# Patient Record
Sex: FEMALE | Race: WHITE | NOT HISPANIC OR LATINO | Employment: FULL TIME | URBAN - METROPOLITAN AREA
[De-identification: names, ages, dates, MRNs, and addresses within clinical notes are randomized per-mention and may not be internally consistent; named-entity substitution may affect disease eponyms.]

---

## 2017-02-21 ENCOUNTER — COMMUNICATION - HEALTHEAST (OUTPATIENT)
Dept: INTERNAL MEDICINE | Facility: CLINIC | Age: 42
End: 2017-02-21

## 2017-04-07 ENCOUNTER — COMMUNICATION - HEALTHEAST (OUTPATIENT)
Dept: INTERNAL MEDICINE | Facility: CLINIC | Age: 42
End: 2017-04-07

## 2017-07-11 ENCOUNTER — COMMUNICATION - HEALTHEAST (OUTPATIENT)
Dept: INTERNAL MEDICINE | Facility: CLINIC | Age: 42
End: 2017-07-11

## 2017-07-19 ENCOUNTER — COMMUNICATION - HEALTHEAST (OUTPATIENT)
Dept: INTERNAL MEDICINE | Facility: CLINIC | Age: 42
End: 2017-07-19

## 2017-07-19 DIAGNOSIS — F41.1 GAD (GENERALIZED ANXIETY DISORDER): ICD-10-CM

## 2017-11-21 ENCOUNTER — COMMUNICATION - HEALTHEAST (OUTPATIENT)
Dept: INTERNAL MEDICINE | Facility: CLINIC | Age: 42
End: 2017-11-21

## 2018-01-09 ENCOUNTER — COMMUNICATION - HEALTHEAST (OUTPATIENT)
Dept: INTERNAL MEDICINE | Facility: CLINIC | Age: 43
End: 2018-01-09

## 2018-10-22 ENCOUNTER — OFFICE VISIT - HEALTHEAST (OUTPATIENT)
Dept: INTERNAL MEDICINE | Facility: CLINIC | Age: 43
End: 2018-10-22

## 2018-10-22 DIAGNOSIS — F41.1 GAD (GENERALIZED ANXIETY DISORDER): ICD-10-CM

## 2018-10-22 DIAGNOSIS — F41.1 ANXIETY STATE: ICD-10-CM

## 2018-10-22 DIAGNOSIS — R00.2 PALPITATIONS: ICD-10-CM

## 2018-10-22 LAB
ATRIAL RATE - MUSE: 103 BPM
DIASTOLIC BLOOD PRESSURE - MUSE: NORMAL MMHG
INTERPRETATION ECG - MUSE: NORMAL
P AXIS - MUSE: 46 DEGREES
PR INTERVAL - MUSE: 120 MS
QRS DURATION - MUSE: 82 MS
QT - MUSE: 362 MS
QTC - MUSE: 474 MS
R AXIS - MUSE: -9 DEGREES
SYSTOLIC BLOOD PRESSURE - MUSE: NORMAL MMHG
T AXIS - MUSE: 23 DEGREES
VENTRICULAR RATE- MUSE: 103 BPM

## 2018-10-22 ASSESSMENT — MIFFLIN-ST. JEOR: SCORE: 1684.61

## 2018-11-05 NOTE — RAD
PA AND LATERAL CHEST XRAY:

 

DATE: 11/5/2018.

 

HISTORY: 

Cough, body aches, and sore throat.

 

COMPARISON: 

None available.

 

FINDINGS: 

Postsurgical changes related to anterior cervical fusion lower cervical spine are noted.  Cardiac tobias
houette and pulmonary vasculature are within normal limits.  The lungs are clear.  Osseous structures
 are intact.  

 

IMPRESSION: 

No acute cardiopulmonary process.

 

POS: [facility]

## 2019-01-02 ENCOUNTER — OFFICE VISIT - HEALTHEAST (OUTPATIENT)
Dept: INTERNAL MEDICINE | Facility: CLINIC | Age: 44
End: 2019-01-02

## 2019-01-02 DIAGNOSIS — Z00.00 ROUTINE GENERAL MEDICAL EXAMINATION AT A HEALTH CARE FACILITY: ICD-10-CM

## 2019-01-02 LAB
ALBUMIN SERPL-MCNC: 3.8 G/DL (ref 3.5–5)
ALP SERPL-CCNC: 69 U/L (ref 45–120)
ALT SERPL W P-5'-P-CCNC: 21 U/L (ref 0–45)
ANION GAP SERPL CALCULATED.3IONS-SCNC: 12 MMOL/L (ref 5–18)
AST SERPL W P-5'-P-CCNC: 21 U/L (ref 0–40)
BILIRUB SERPL-MCNC: 0.8 MG/DL (ref 0–1)
BUN SERPL-MCNC: 13 MG/DL (ref 8–22)
CALCIUM SERPL-MCNC: 9.2 MG/DL (ref 8.5–10.5)
CHLORIDE BLD-SCNC: 106 MMOL/L (ref 98–107)
CHOLEST SERPL-MCNC: 180 MG/DL
CO2 SERPL-SCNC: 23 MMOL/L (ref 22–31)
CREAT SERPL-MCNC: 0.83 MG/DL (ref 0.6–1.1)
ERYTHROCYTE [DISTWIDTH] IN BLOOD BY AUTOMATED COUNT: 13.6 % (ref 11–14.5)
FASTING STATUS PATIENT QL REPORTED: YES
GFR SERPL CREATININE-BSD FRML MDRD: >60 ML/MIN/1.73M2
GLUCOSE BLD-MCNC: 92 MG/DL (ref 70–125)
HCT VFR BLD AUTO: 41.6 % (ref 35–47)
HDLC SERPL-MCNC: 53 MG/DL
HGB BLD-MCNC: 13.6 G/DL (ref 12–16)
LDLC SERPL CALC-MCNC: 99 MG/DL
MCH RBC QN AUTO: 29.4 PG (ref 27–34)
MCHC RBC AUTO-ENTMCNC: 32.7 G/DL (ref 32–36)
MCV RBC AUTO: 90 FL (ref 80–100)
PLATELET # BLD AUTO: 271 THOU/UL (ref 140–440)
PMV BLD AUTO: 11 FL (ref 8.5–12.5)
POTASSIUM BLD-SCNC: 4 MMOL/L (ref 3.5–5)
PROT SERPL-MCNC: 7 G/DL (ref 6–8)
RBC # BLD AUTO: 4.62 MILL/UL (ref 3.8–5.4)
SODIUM SERPL-SCNC: 141 MMOL/L (ref 136–145)
TRIGL SERPL-MCNC: 139 MG/DL
WBC: 6.8 THOU/UL (ref 4–11)

## 2019-01-02 ASSESSMENT — MIFFLIN-ST. JEOR: SCORE: 1702.76

## 2019-01-03 ENCOUNTER — COMMUNICATION - HEALTHEAST (OUTPATIENT)
Dept: INTERNAL MEDICINE | Facility: CLINIC | Age: 44
End: 2019-01-03

## 2019-02-19 ENCOUNTER — COMMUNICATION - HEALTHEAST (OUTPATIENT)
Dept: INTERNAL MEDICINE | Facility: CLINIC | Age: 44
End: 2019-02-19

## 2019-02-28 ENCOUNTER — OFFICE VISIT (OUTPATIENT)
Dept: OBGYN | Facility: CLINIC | Age: 44
End: 2019-02-28
Payer: COMMERCIAL

## 2019-02-28 ENCOUNTER — ANCILLARY PROCEDURE (OUTPATIENT)
Dept: MAMMOGRAPHY | Facility: CLINIC | Age: 44
End: 2019-02-28
Payer: COMMERCIAL

## 2019-02-28 VITALS — SYSTOLIC BLOOD PRESSURE: 150 MMHG | DIASTOLIC BLOOD PRESSURE: 86 MMHG | WEIGHT: 212.6 LBS

## 2019-02-28 DIAGNOSIS — Z12.31 VISIT FOR SCREENING MAMMOGRAM: ICD-10-CM

## 2019-02-28 DIAGNOSIS — Z12.4 SCREENING FOR MALIGNANT NEOPLASM OF CERVIX: ICD-10-CM

## 2019-02-28 DIAGNOSIS — Z01.419 ENCOUNTER FOR GYNECOLOGICAL EXAMINATION WITHOUT ABNORMAL FINDING: Primary | ICD-10-CM

## 2019-02-28 PROCEDURE — 87624 HPV HI-RISK TYP POOLED RSLT: CPT | Performed by: OBSTETRICS & GYNECOLOGY

## 2019-02-28 PROCEDURE — 77067 SCR MAMMO BI INCL CAD: CPT | Mod: TC

## 2019-02-28 PROCEDURE — G0124 SCREEN C/V THIN LAYER BY MD: HCPCS | Performed by: OBSTETRICS & GYNECOLOGY

## 2019-02-28 PROCEDURE — 99386 PREV VISIT NEW AGE 40-64: CPT | Performed by: OBSTETRICS & GYNECOLOGY

## 2019-02-28 PROCEDURE — G0145 SCR C/V CYTO,THINLAYER,RESCR: HCPCS | Performed by: OBSTETRICS & GYNECOLOGY

## 2019-02-28 RX ORDER — LORAZEPAM 1 MG/1
1 TABLET ORAL
COMMUNITY
Start: 2018-10-22 | End: 2022-06-07

## 2019-02-28 NOTE — NURSING NOTE
Chief Complaint   Patient presents with     Establish Care     Gyn Exam   c/o vaginal changes    Initial Wt 96.4 kg (212 lb 9.6 oz)   LMP 2019 (Approximate)  There is no height or weight on file to calculate BMI.  BP completed using cuff size: large    Questioned patient about current smoking habits.  Pt. has never smoked.          The following HM Due: NONE      Mia Tomas CMA

## 2019-02-28 NOTE — PROGRESS NOTES
SUBJECTIVE:                                                      Cristina is a 44 year old  female who presents for annual exam.     Patient's last menstrual period was 2019 (approximate).. Menses are regular q 28-30 days and normal lasting 4 days.  Using unsure for contraception.  She is not currently considering pregnancy.  Besides routine health maintenance, she has no other health concerns today .  Had annual physical in 2019 with primary MD  GYNECOLOGIC HISTORY:    Cristina is sexually active with  male partner(s) and is currently in a monogamous relationship.      History sexually transmitted infections:No STD history    History of abnormal Pap smear: NO - age 30- 65 PAP every 3 years recommended  Family history of breast CA: Yes (Please explain): 1 aunt diagnosed at age 50.  Aunt's daughters screened negative  Family history of uterine/ovarian CA: No    Family history of colon CA: No      HISTORY:  Obstetric History       T1      L1     SAB0   TAB0   Ectopic0   Multiple0   Live Births1       # Outcome Date GA Lbr Aryan/2nd Weight Sex Delivery Anes PTL Lv   1 Term 08   3.345 kg (7 lb 6 oz) M    KENNETH        Past Medical History:   Diagnosis Date     Anxiety      Past Surgical History:   Procedure Laterality Date     APPENDECTOMY       No family history on file.  Social History     Socioeconomic History     Marital status:      Spouse name: None     Number of children: None     Years of education: None     Highest education level: None   Occupational History     None   Social Needs     Financial resource strain: None     Food insecurity:     Worry: None     Inability: None     Transportation needs:     Medical: None     Non-medical: None   Tobacco Use     Smoking status: Never Smoker     Smokeless tobacco: Never Used   Substance and Sexual Activity     Alcohol use: Yes     Comment: social     Drug use: No     Sexual activity: Yes     Partners: Male     Birth  control/protection: None   Lifestyle     Physical activity:     Days per week: None     Minutes per session: None     Stress: None   Relationships     Social connections:     Talks on phone: None     Gets together: None     Attends Hindu service: None     Active member of club or organization: None     Attends meetings of clubs or organizations: None     Relationship status: None     Intimate partner violence:     Fear of current or ex partner: None     Emotionally abused: None     Physically abused: None     Forced sexual activity: None   Other Topics Concern     None   Social History Narrative     None       Current Outpatient Medications:      LORazepam (ATIVAN) 1 MG tablet, Take 1 mg by mouth, Disp: , Rfl:      sertraline (ZOLOFT) 50 MG tablet, Take 50 mg by mouth, Disp: , Rfl:    No Known Allergies    Past medical, surgical, social and family history were reviewed and updated in EPIC.    ROS:   12 point review of systems negative other than symptoms noted below.      OBJECTIVE:                                                      EXAM:  /86   Wt 96.4 kg (212 lb 9.6 oz)   LMP 02/01/2019 (Approximate)    BMI: There is no height or weight on file to calculate BMI.  General: Alert and oriented, no distress.  Psychiatric: Mood and affect within normal limits.    Abdomen: Soft, nontender, no hepatosplenomegaly, no rebound or guarding, no masses, no hernias.   Vulva:  No external lesions, normal female hair distribution, no inguinal adenopathy.    Urethra:  Midline, non-tender, well supported, no discharge  Vagina:  Well-estrogenized, no abnormal discharge, no lesions  Cervix: no lesions, no discharge and multiparous  Uterus:  anteverted, smooth contour, without enlargement, mobile, and without tenderness  Ovaries:  No masses appreciated, non-tender, mobile  Rectal Exam: deferred  Musculoskeletal: extremities normal      COUNSELING:   Reviewed preventive health counseling, as reflected in patient  instructions   reports that  has never smoked. she has never used smokeless tobacco.        ASSESSMENT/PLAN:                                                      44 year old female with satisfactory annual exam  (Z01.419) Encounter for gynecological examination without abnormal finding  (primary encounter diagnosis)  Comment: Normal Gyn exam  Plan: RTC 1 year or prn    (Z12.4) Screening for malignant neoplasm of cervix  Plan: Pap imaged thin layer screen with HPV -         recommended age 30 - 65 years (select HPV order        below), HPV High Risk Types DNA Cervical              Zelalem Walker MD

## 2019-02-28 NOTE — LETTER
March 11, 2019    Cristina Eaton  [address] [place] [address]    Dear ,  This letter is regarding your recent Pap smear (cervical cancer screening) and Human Papillomavirus (HPV) test.  We are happy to inform you that your Pap smear result is normal. Cervical cancer is closely linked with certain types of HPV. Your results showed no evidence of high-risk HPV.  Therefore we recommend you return in 3 years for your next pap smear.  You will still need to return to the clinic every year for an annual exam and other preventive tests.  If you have additional questions regarding this result, please call our registered nurse, Heather at [phone].  Sincerely,    Gallo Walker MD/[facility]

## 2019-03-05 LAB
COPATH REPORT: NORMAL
PAP: NORMAL

## 2019-03-06 LAB
FINAL DIAGNOSIS: NORMAL
HPV HR 12 DNA CVX QL NAA+PROBE: NEGATIVE
HPV16 DNA SPEC QL NAA+PROBE: NEGATIVE
HPV18 DNA SPEC QL NAA+PROBE: NEGATIVE
SPECIMEN DESCRIPTION: NORMAL
SPECIMEN SOURCE CVX/VAG CYTO: NORMAL

## 2019-10-21 ENCOUNTER — COMMUNICATION - HEALTHEAST (OUTPATIENT)
Dept: INTERNAL MEDICINE | Facility: CLINIC | Age: 44
End: 2019-10-21

## 2019-10-22 ENCOUNTER — COMMUNICATION - HEALTHEAST (OUTPATIENT)
Dept: INTERNAL MEDICINE | Facility: CLINIC | Age: 44
End: 2019-10-22

## 2019-12-03 ENCOUNTER — COMMUNICATION - HEALTHEAST (OUTPATIENT)
Dept: INTERNAL MEDICINE | Facility: CLINIC | Age: 44
End: 2019-12-03

## 2019-12-03 DIAGNOSIS — F41.1 ANXIETY STATE: ICD-10-CM

## 2020-02-15 ENCOUNTER — COMMUNICATION - HEALTHEAST (OUTPATIENT)
Dept: INTERNAL MEDICINE | Facility: CLINIC | Age: 45
End: 2020-02-15

## 2020-02-15 DIAGNOSIS — F41.1 GAD (GENERALIZED ANXIETY DISORDER): ICD-10-CM

## 2020-03-16 ENCOUNTER — COMMUNICATION - HEALTHEAST (OUTPATIENT)
Dept: INTERNAL MEDICINE | Facility: CLINIC | Age: 45
End: 2020-03-16

## 2020-03-16 DIAGNOSIS — F41.1 ANXIETY STATE: ICD-10-CM

## 2020-05-28 ENCOUNTER — COMMUNICATION - HEALTHEAST (OUTPATIENT)
Dept: INTERNAL MEDICINE | Facility: CLINIC | Age: 45
End: 2020-05-28

## 2020-05-28 DIAGNOSIS — F41.1 GAD (GENERALIZED ANXIETY DISORDER): ICD-10-CM

## 2020-07-16 ENCOUNTER — COMMUNICATION - HEALTHEAST (OUTPATIENT)
Dept: INTERNAL MEDICINE | Facility: CLINIC | Age: 45
End: 2020-07-16

## 2020-07-23 ENCOUNTER — OFFICE VISIT (OUTPATIENT)
Dept: URGENT CARE | Facility: URGENT CARE | Age: 45
End: 2020-07-23
Payer: COMMERCIAL

## 2020-07-23 VITALS
RESPIRATION RATE: 12 BRPM | HEART RATE: 94 BPM | SYSTOLIC BLOOD PRESSURE: 132 MMHG | DIASTOLIC BLOOD PRESSURE: 90 MMHG | TEMPERATURE: 98 F | OXYGEN SATURATION: 97 % | WEIGHT: 216 LBS

## 2020-07-23 DIAGNOSIS — R30.0 DYSURIA: Primary | ICD-10-CM

## 2020-07-23 DIAGNOSIS — N89.8 VAGINAL ITCHING: ICD-10-CM

## 2020-07-23 LAB
ALBUMIN UR-MCNC: NEGATIVE MG/DL
APPEARANCE UR: CLEAR
BILIRUB UR QL STRIP: NEGATIVE
COLOR UR AUTO: YELLOW
GLUCOSE UR STRIP-MCNC: NEGATIVE MG/DL
HGB UR QL STRIP: ABNORMAL
KETONES UR STRIP-MCNC: NEGATIVE MG/DL
LEUKOCYTE ESTERASE UR QL STRIP: NEGATIVE
NITRATE UR QL: NEGATIVE
NON-SQ EPI CELLS #/AREA URNS LPF: NORMAL /LPF
PH UR STRIP: 6.5 PH (ref 5–7)
RBC #/AREA URNS AUTO: NORMAL /HPF
SOURCE: ABNORMAL
SP GR UR STRIP: 1.01 (ref 1–1.03)
SPECIMEN SOURCE: NORMAL
UROBILINOGEN UR STRIP-ACNC: 0.2 EU/DL (ref 0.2–1)
WBC #/AREA URNS AUTO: NORMAL /HPF
WET PREP SPEC: NORMAL

## 2020-07-23 PROCEDURE — 87086 URINE CULTURE/COLONY COUNT: CPT | Performed by: FAMILY MEDICINE

## 2020-07-23 PROCEDURE — 87210 SMEAR WET MOUNT SALINE/INK: CPT | Performed by: FAMILY MEDICINE

## 2020-07-23 PROCEDURE — 99203 OFFICE O/P NEW LOW 30 MIN: CPT | Performed by: FAMILY MEDICINE

## 2020-07-23 PROCEDURE — 81001 URINALYSIS AUTO W/SCOPE: CPT | Performed by: FAMILY MEDICINE

## 2020-07-23 RX ORDER — NITROFURANTOIN 25; 75 MG/1; MG/1
100 CAPSULE ORAL 2 TIMES DAILY
Qty: 14 CAPSULE | Refills: 0 | Status: SHIPPED | OUTPATIENT
Start: 2020-07-23 | End: 2020-07-30

## 2020-07-23 RX ORDER — FLUCONAZOLE 150 MG/1
150 TABLET ORAL WEEKLY
Qty: 2 TABLET | Refills: 0 | Status: SHIPPED | OUTPATIENT
Start: 2020-07-23 | End: 2020-07-31

## 2020-07-23 NOTE — NURSING NOTE
Chief Complaint   Patient presents with     Urgent Care     UTI     Pt c/o of some itching in vaginal area and some burning with urination.  This started 2-3 days ago.  Today she noticed some back pain this morning.  Does not have a fever.  She has not noticed any bad urine odor.      Initial BP (!) 132/90 (BP Location: Right arm, Patient Position: Sitting, Cuff Size: Adult Regular)   Pulse 94   Temp 98  F (36.7  C) (Tympanic)   Resp 12   Wt 98 kg (216 lb)   SpO2 97%  There is no height or weight on file to calculate BMI..  BP completed using cuff size: regular  Lacy Baugh R.N.

## 2020-07-23 NOTE — PROGRESS NOTES
SUBJECTIVE:   Cristina Eaton is a 45 year old female who  presents today for a possible UTI. Symptoms of dysuria and frequency have been going on for 1day(s).  Hematuria no.  sudden onset and still presentand moderate.  There is no history of fever, chills, nausea or vomiting.  Noted vaginal discharge - clear, notice more vaginal itchiness.  No recent antibiotic.  No concerns for STD. This patient does not have a history of urinary tract infections.    Has not tried OTC cream for itchiness.    Still having menses.    Past Medical History:   Diagnosis Date     Anxiety 2009     Current Outpatient Medications   Medication Sig Dispense Refill     LORazepam (ATIVAN) 1 MG tablet Take 1 mg by mouth       sertraline (ZOLOFT) 50 MG tablet Take 50 mg by mouth       Social History     Tobacco Use     Smoking status: Never Smoker     Smokeless tobacco: Never Used   Substance Use Topics     Alcohol use: Yes     Comment: social       ROS:   Review of systems negative except as stated above.    OBJECTIVE:  BP (!) 132/90 (BP Location: Right arm, Patient Position: Sitting, Cuff Size: Adult Regular)   Pulse 94   Temp 98  F (36.7  C) (Tympanic)   Resp 12   Wt 98 kg (216 lb)   SpO2 97%   GENERAL APPEARANCE: healthy, alert and no distress  PSYCH: mentation appears normal and affect normal/bright    Results for orders placed or performed in visit on 07/23/20   *UA reflex to Microscopic and Culture ([place] [facility] (except [place] and [place])     Status: Abnormal    Specimen: Midstream Urine   Result Value Ref Range    Color Urine Yellow     Appearance Urine Clear     Glucose Urine Negative NEG^Negative mg/dL    Bilirubin Urine Negative NEG^Negative    Ketones Urine Negative NEG^Negative mg/dL    Specific Gravity Urine 1.010 1.003 - 1.035    Blood Urine Trace (A) NEG^Negative    pH Urine 6.5 5.0 - 7.0 pH    Protein Albumin Urine Negative NEG^Negative mg/dL    Urobilinogen Urine 0.2 0.2 - 1.0 EU/dL    Nitrite Urine  Negative NEG^Negative    Leukocyte Esterase Urine Negative NEG^Negative    Source Midstream Urine    Urine Microscopic     Status: None   Result Value Ref Range    WBC Urine 0 - 5 OTO5^0 - 5 /HPF    RBC Urine O - 2 OTO2^O - 2 /HPF    Squamous Epithelial /LPF Urine Few FEW^Few /LPF   Wet prep     Status: None    Specimen: Vagina   Result Value Ref Range    Specimen Description Vagina     Wet Prep No clue cells seen     Wet Prep No yeast seen     Wet Prep No Trichomonas seen     Wet Prep Rare  WBC'S seen          ASSESSMENT/PLAN:   (R30.0) Dysuria  (primary encounter diagnosis)  Plan: *UA reflex to Microscopic and Culture ([place]         and [facility] (except [place] and         [place]), Urine Microscopic, Urine Culture         Aerobic Bacterial, nitroFURantoin         macrocrystal-monohydrate (MACROBID) 100 MG         capsule            (N89.8) Vaginal itching  Plan: Wet prep, fluconazole (DIFLUCAN) 150 MG tablet            Empiric coverage for presumptive UTI with RX macrobid.  Will follow up on urine culture and adjust medication if needed.  Drink plenty of fluids.  Prevention and treatment of UTI's discussed.Signs and symptoms of pyelonephritis mentioned.  RX diflucan given for empiric coverage for vaginal itchiness, okay to repeat in 1 week due to antibiotic use for UTI.    Follow up with primary provider if no improvement of symptoms in 1-2 weeks    Reji Sheets MD  July 23, 2020 7:08 PM

## 2020-07-24 LAB
BACTERIA SPEC CULT: NORMAL
SPECIMEN SOURCE: NORMAL

## 2021-01-03 ENCOUNTER — HEALTH MAINTENANCE LETTER (OUTPATIENT)
Age: 46
End: 2021-01-03

## 2021-01-25 ENCOUNTER — IMMUNIZATION (OUTPATIENT)
Dept: NURSING | Facility: CLINIC | Age: 46
End: 2021-01-25
Payer: COMMERCIAL

## 2021-01-25 PROCEDURE — 0001A PR COVID VAC PFIZER DIL RECON 30 MCG/0.3 ML IM: CPT

## 2021-01-25 PROCEDURE — 91300 PR COVID VAC PFIZER DIL RECON 30 MCG/0.3 ML IM: CPT

## 2021-02-15 ENCOUNTER — IMMUNIZATION (OUTPATIENT)
Dept: NURSING | Facility: CLINIC | Age: 46
End: 2021-02-15
Attending: INTERNAL MEDICINE
Payer: COMMERCIAL

## 2021-02-15 PROCEDURE — 0002A PR COVID VAC PFIZER DIL RECON 30 MCG/0.3 ML IM: CPT

## 2021-02-15 PROCEDURE — 91300 PR COVID VAC PFIZER DIL RECON 30 MCG/0.3 ML IM: CPT

## 2021-03-07 ENCOUNTER — HEALTH MAINTENANCE LETTER (OUTPATIENT)
Age: 46
End: 2021-03-07

## 2021-06-02 VITALS — HEIGHT: 69 IN | BODY MASS INDEX: 31.84 KG/M2 | WEIGHT: 215 LBS

## 2021-06-02 VITALS — HEIGHT: 69 IN | BODY MASS INDEX: 32.44 KG/M2 | WEIGHT: 219 LBS

## 2021-06-02 NOTE — TELEPHONE ENCOUNTER
Who is requesting the letter?  Patient   Why is the letter needed? Patient states she discussed with provider about weight loss requesting  a letter  From physician for recommendation for weight loss so that her HS will pay back .   How would you like this letter returned? Patient requested to mail it to home address   Okay to leave a detailed message? No

## 2021-06-03 NOTE — TELEPHONE ENCOUNTER
Refill Approved    Rx renewed per Medication Renewal Policy. Medication was last renewed on 2/19/19.    Aria Mckeon, Care Connection Triage/Med Refill 12/3/2019     Requested Prescriptions   Pending Prescriptions Disp Refills     sertraline (ZOLOFT) 50 MG tablet [Pharmacy Med Name: SERTRALINE HCL 50MG TABS] 60 tablet 3     Sig: TAKE ONE TABLET BY MOUTH EVERY DAY       SSRI Refill Protocol  Passed - 12/3/2019  3:50 AM        Passed - PCP or prescribing provider visit in last year     Last office visit with prescriber/PCP: 10/22/2018 Luis Jolley MD OR same dept: Visit date not found OR same specialty: 10/22/2018 Luis Jolley MD  Last physical: 1/2/2019 Last MTM visit: Visit date not found   Next visit within 3 mo: Visit date not found  Next physical within 3 mo: Visit date not found  Prescriber OR PCP: Luis Jolley MD  Last diagnosis associated with med order: There are no diagnoses linked to this encounter.  If protocol passes may refill for 12 months if within 3 months of last provider visit (or a total of 15 months).

## 2021-06-06 NOTE — TELEPHONE ENCOUNTER
Please inform patient this will be the last prescription I will sign for this order.  She needs to schedule an appointment with a new provider moving forward.  Thank you

## 2021-06-06 NOTE — TELEPHONE ENCOUNTER
Former patient of [facility] & has not established care with another provider.  Please assign refill request to covering provider per Clinic standard process.  Controlled Substance Refill Request  Medication Name:   Requested Prescriptions     Pending Prescriptions Disp Refills     LORazepam (ATIVAN) 1 MG tablet [Pharmacy Med Name: LORAZEPAM 1MG TABS] 60 tablet 0     Sig: TAKE ONE TABLET BY MOUTH THREE TIMES A DAY AS NEEDED     Date Last Fill: 10/22/18  Requested [facility]acy: [facility]  Submit electronically to pharmacy  Controlled Substance Agreement on file:   Encounter-Level CSA Scan Date:    There are no encounter-level csa scan date.        Last office visit:  1/2/19

## 2021-06-06 NOTE — TELEPHONE ENCOUNTER
RN cannot approve Refill Request    RN can NOT refill this medication Protocol failed and NO refill given. Last office visit: 10/22/2018 Luis Jolley MD Last Physical: 1/2/2019 Last MTM visit: Visit date not found Last visit same specialty: 10/22/2018 Luis Jolley MD.  Next visit within 3 mo: Visit date not found  Next physical within 3 mo: Visit date not found    Patient has not been seen in >1 year.     Rachell Wade, [facility] Connection Triage/Med Refill 3/17/2020    Requested Prescriptions   Pending Prescriptions Disp Refills     sertraline (ZOLOFT) 50 MG tablet [Pharmacy Med Name: SERTRALINE HCL 50MG TABS] 90 tablet 3     Sig: TAKE ONE TABLET BY MOUTH EVERY DAY       SSRI Refill Protocol  Failed - 3/16/2020  3:26 AM        Failed - PCP or prescribing provider visit in last year     Last office visit with prescriber/PCP: 10/22/2018 Luis Jolley MD OR same dept: Visit date not found OR same specialty: 10/22/2018 Luis Jolley MD  Last physical: 1/2/2019 Last MTM visit: Visit date not found   Next visit within 3 mo: Visit date not found  Next physical within 3 mo: Visit date not found  Prescriber OR PCP: Luis Jolley MD  Last diagnosis associated with med order: 1. Anxiety Disorder NOS  - sertraline (ZOLOFT) 50 MG tablet [Pharmacy Med Name: SERTRALINE HCL 50MG TABS]; TAKE ONE TABLET BY MOUTH EVERY DAY  Dispense: 90 tablet; Refill: 0    If protocol passes may refill for 12 months if within 3 months of last provider visit (or a total of 15 months).

## 2021-06-08 NOTE — TELEPHONE ENCOUNTER
Former patient of [facility] & has not established care with another provider.  Please assign refill request to covering provider per Clinic standard process.  Controlled Substance Refill Request  Medication Name:   Requested Prescriptions     Pending Prescriptions Disp Refills     LORazepam (ATIVAN) 1 MG tablet [Pharmacy Med Name: LORAZEPAM 1MG TABS] 60 tablet 0     Sig: TAKE ONE TABLET BY MOUTH THREE TIMES A DAY AS NEEDED     Date Last Fill: 2/20/20  Requested Pharmacy: [facility]  Submit electronically to pharmacy  Controlled Substance Agreement on file:   Encounter-Level CSA Scan Date:    There are no encounter-level csa scan date.        Last office visit:  1/2/19

## 2021-06-09 NOTE — TELEPHONE ENCOUNTER
sertraline (ZOLOFT) 25 MG tablet [[id number]]     Electronically signed by: Luis Jolley MD on 01/02/19 0854  Status: Discontinued    Ordering user: Luis Jolley MD 01/02/19 0854  Authorized by: Luis Jolley MD    Frequency: DAILY 01/02/19 - 02/19/19  Discontinued by: Luis Jolley MD

## 2021-06-16 NOTE — TELEPHONE ENCOUNTER
Telephone Encounter by Donna Saha LPN at 2/19/2019 12:51 PM     Author: Donna Saha LPN Service: -- Author Type: Licensed Nurse    Filed: 2/19/2019 12:52 PM Encounter Date: 2/19/2019 Status: Signed    : Donna Saha LPN (Licensed Nurse)       Luis Jolley MD   You; Giefer, Luis Care Team Pool 32 minutes ago (12:18 PM)      I sent in a prescription for 50 mg.  I would like her to try this dose for another 4 weeks or so and then follow-up in the office when we can discuss whether we need to increase further at some point-thanks (Routing comment)       Luis Jolley MD routed conversation to You; Luis Jolley [facility] Pool          Patient notified- did not have her calendar with her- she will call back to schedule follow-up appt

## 2021-06-16 NOTE — TELEPHONE ENCOUNTER
Telephone Encounter by Donna Saha LPN at 10/22/2019 10:32 AM     Author: Donna Saha LPN Service: -- Author Type: Licensed Nurse    Filed: 10/22/2019 10:39 AM Encounter Date: 10/21/2019 Status: Signed    : Donna Saha LPN (Licensed Nurse)       Luis Jolley MD Thompson, Becky, CMA 17 hours ago (5:05 PM)      Please get more information I do not recall any discussion and there are no notes regarding this in the visit.  Specifically what does a letter need to say and who should it be addressed to    Routing comment      Spoke with patient- she needs a letter for [facility] program stating that you recommend that she lose weight

## 2021-06-18 NOTE — LETTER
Letter by Luis Jolley MD at      Author: Luis Jolley MD Service: -- Author Type: --    Filed:  Encounter Date: 1/3/2019 Status: (Other)       Cristina Eaton  [address] [place] [address]             January 3, 2019         Dear Ms. Eaton,    Below are the results from your recent visit:    Resulted Orders   HM2(CBC w/o Differential)   Result Value Ref Range    WBC 6.8 4.0 - 11.0 thou/uL    RBC 4.62 3.80 - 5.40 mill/uL    Hemoglobin 13.6 12.0 - 16.0 g/dL    Hematocrit 41.6 35.0 - 47.0 %    MCV 90 80 - 100 fL    MCH 29.4 27.0 - 34.0 pg    MCHC 32.7 32.0 - 36.0 g/dL    RDW 13.6 11.0 - 14.5 %    Platelets 271 140 - 440 thou/uL    MPV 11.0 8.5 - 12.5 fL   Comprehensive Metabolic Panel   Result Value Ref Range    Sodium 141 136 - 145 mmol/L    Potassium 4.0 3.5 - 5.0 mmol/L    Chloride 106 98 - 107 mmol/L    CO2 23 22 - 31 mmol/L    Anion Gap, Calculation 12 5 - 18 mmol/L    Glucose 92 70 - 125 mg/dL    BUN 13 8 - 22 mg/dL    Creatinine 0.83 0.60 - 1.10 mg/dL    GFR MDRD Af Amer >60 >60 mL/min/1.73m2    GFR MDRD Non Af Amer >60 >60 mL/min/1.73m2    Bilirubin, Total 0.8 0.0 - 1.0 mg/dL    Calcium 9.2 8.5 - 10.5 mg/dL    Protein, Total 7.0 6.0 - 8.0 g/dL    Albumin 3.8 3.5 - 5.0 g/dL    Alkaline Phosphatase 69 45 - 120 U/L    AST 21 0 - 40 U/L    ALT 21 0 - 45 U/L    Narrative    Fasting Glucose reference range is 70-99 mg/dL per  American Diabetes Association (ADA) guidelines.   Lipid Cascade   Result Value Ref Range    Cholesterol 180 <=199 mg/dL    Triglycerides 139 <=149 mg/dL    HDL Cholesterol 53 >=50 mg/dL    LDL Calculated 99 <=129 mg/dL    Patient Fasting > 8hrs? Yes        All labs look great    Please call with questions or contact us using [employer].    Sincerely,        Electronically signed by Luis Jolley MD

## 2021-06-19 NOTE — LETTER
Letter by Luis Jolley MD at      Author: Luis Jolley MD Service: -- Author Type: --    Filed:  Encounter Date: 10/22/2019 Status: Signed       October 22, 2019    Re: Cristina Eaton  Date of birth 1975      To whom it may concern:  Cristina Eaton is under my care for multiple medical concerns and her BMI is 32.3 and I have recommended a weight loss program    Sincerely Luis Jolley MD

## 2021-06-21 NOTE — PROGRESS NOTES
"Office Visit - Follow up    Cristina Eaton   43 y.o. female    Date of Visit: 10/22/2018    Chief Complaint   Patient presents with     Irregular heartbeat     1 month       Subjective: Delightful healthy 43-year-old woman with history of irritable bowel syndrome and intermittent diarrhea as well as mild anxiety disorder presents with several week history of symptoms of irregularity of heartbeat and rapid heartbeat.  She is more aware of her heartbeat from time to time and occasionally this is accompanied by symptoms of lightheadedness.    She denies vertigo she denies any visual or auditory symptoms she denies headache or other neurologic complaints.  She has had no difficulty with ataxia or gait disturbance.    Denies chest pain or dyspnea also has noted intermittent symptoms of an awareness of heartbeat which are very brief.  Denies any symptoms of angina    Current medications reviewed she has been off of citalopram and has not required the use of dicyclomine or diphenoxylate which were prescribed for her irritable bowel symptoms.    Otherwise feels well without complaints but remains apprehensive.  She has been under a great deal of stress recently.  Denies excessive ethanol use or other drug use.  Caffeine intake is modest    Family history none relevant        ROS: A comprehensive review of systems was performed and was otherwise negative except as mentioned above.     Exam  Alert and oriented she is apprehensive I did repeat a blood pressure which was 128/68 pulse was improved at 100 regular sinus rhythm heart normal lungs clear abdomen benign extremities normal peripheral pulses normal       BP (!) 134/100  Pulse (!) 116  Ht 5' 9\" (1.753 m)  Wt 215 lb (97.5 kg)  BMI 31.75 kg/m2    Assessment and Plan  Appears to be primarily related to anxiety I do not appreciate any arrhythmia on auscultation.  I did reassure her.  I would recommend a regular physical in the near future at that time thyroid function " etc. will be drawn.  For now we will treat with lorazepam as needed discussed this at length no other changes she feels reassured    Cristina was seen today for irregular heartbeat.    Diagnoses and all orders for this visit:    Palpitations  -     Electrocardiogram Perform - Clinic    Anxiety Disorder NOS    LORNE (generalized anxiety disorder)  -     LORazepam (ATIVAN) 1 MG tablet; Take 1 tablet (1 mg total) by mouth 3 (three) times a day as needed.          Time: total time spent with the patient was 25 minutes of which >50% was spent in counseling and coordination of care        No Known Allergies    Medications :  Prior to Admission medications    Medication Sig Start Date End Date Taking? Authorizing Provider   LORazepam (ATIVAN) 1 MG tablet Take 1 tablet (1 mg total) by mouth 3 (three) times a day as needed. 10/22/18   Luis Jolley MD        Past Medical History: No past medical history on file.    Past Surgical History: No past surgical history on file.    Social History:   Social History     Social History     Marital status:      Spouse name: N/A     Number of children: N/A     Years of education: N/A     Occupational History     Not on file.     Social History Main Topics     Smoking status: Never Smoker     Smokeless tobacco: Never Used     Alcohol use Not on file     Drug use: Not on file     Sexual activity: Not on file     Other Topics Concern     Not on file     Social History Narrative       Family History:   Family History   Problem Relation Age of Onset     Breast cancer Maternal Aunt 38     No Medical Problems Mother      No Medical Problems Father      No Medical Problems Sister      No Medical Problems Daughter      No Medical Problems Maternal Grandmother      No Medical Problems Maternal Grandfather      No Medical Problems Paternal Grandmother      No Medical Problems Paternal Grandfather      No Medical Problems Paternal Aunt      BRCA 1/2 Neg Hx      Cancer Neg Hx      Colon cancer Neg  Hx      Endometrial cancer Neg Hx      Ovarian cancer Neg Hx          Luis Jolley MD

## 2021-06-22 NOTE — PROGRESS NOTES
"Office Visit - Physical    Cristina Eaton   43 y.o. female    Date of Visit: 1/2/2019    Chief Complaint   Patient presents with     Annual Exam     fasting       Subjective: Delightful 43-year-old patient who works for [employer] with Dr. Galicia who presents for routine exam.  History of mild irritable bowel syndrome has been stable off of therapy.  Intermittent problems with anxiety and we discussed this at length.  See below.    Past medical history otherwise negative    She is seen by her gynecologist for GYN care.    Personal social and family history reviewed no changes or concerns she continues to work full-time    Head and neck    ROS: A comprehensive review of systems was performed and was otherwise negative except as mentioned above.    Exam   The neck unremarkable EENT negative skin and lymphatics normal lungs clear heart normal breast exam negative unremarkable peripheral pulses normal neuro exam negative  /74 (Patient Site: Right Arm, Patient Position: Sitting, Cuff Size: Adult Regular)   Pulse 86   Ht 5' 9\" (1.753 m)   Wt 219 lb (99.3 kg)   SpO2 100%   BMI 32.34 kg/m      Assessment and Plan    Stable physical exam.  Intermittent symptoms of anxiety we did have a discussion about this she did not tolerate citalopram.  Will begin a trial of sertraline 25 mg may need to increase discussed specific guidelines regarding occasional use of lorazepam    Cristina was seen today for annual exam.    Diagnoses and all orders for this visit:    Routine general medical examination at a health care facility  -     HM2(CBC w/o Differential); Future  -     Comprehensive Metabolic Panel; Future  -     Lipid Cascade; Future  -     HM2(CBC w/o Differential)  -     Comprehensive Metabolic Panel  -     Lipid Cascade    Other orders  -     sertraline (ZOLOFT) 25 MG tablet; Take 1 tablet (25 mg total) by mouth daily.              Medications:   Prior to Admission medications    Medication Sig Start Date End " Date Taking? Authorizing Provider   LORazepam (ATIVAN) 1 MG tablet Take 1 tablet (1 mg total) by mouth 3 (three) times a day as needed. 10/22/18  Yes Luis Jolley MD   sertraline (ZOLOFT) 25 MG tablet Take 1 tablet (25 mg total) by mouth daily. 1/2/19   Luis Jolley MD       Allergies:No Known Allergies    Immunizations:   Immunization History   Administered Date(s) Administered     Influenza [id number], 01/11/2010     Influenza, inj, historic,unspecified 01/11/2010     Tdap 10/21/2008       Past Medical History: No past medical history on file.    Past Surgical History: No past surgical history on file.    Family History:   Family History   Problem Relation Age of Onset     Breast cancer Maternal Aunt 38     No Medical Problems Mother      No Medical Problems Father      No Medical Problems Sister      No Medical Problems Daughter      No Medical Problems Maternal Grandmother      No Medical Problems Maternal Grandfather      No Medical Problems Paternal Grandmother      No Medical Problems Paternal Grandfather      No Medical Problems Paternal Aunt      BRCA 1/2 Neg Hx      Cancer Neg Hx      Colon cancer Neg Hx      Endometrial cancer Neg Hx      Ovarian cancer Neg Hx        Social History:   Social History     Socioeconomic History     Marital status:      Spouse name: Not on file     Number of children: Not on file     Years of education: Not on file     Highest education level: Not on file   Social Needs     Financial resource strain: Not on file     Food insecurity - worry: Not on file     Food insecurity - inability: Not on file     Transportation needs - medical: Not on file     Transportation needs - non-medical: Not on file   Occupational History     Not on file   Tobacco Use     Smoking status: Never Smoker     Smokeless tobacco: Never Used   Substance and Sexual Activity     Alcohol use: Not on file     Drug use: Not on file     Sexual activity: Not on file   Other Topics Concern     Not on  file   Social History Narrative     Not on file         Luis Jolley MD

## 2021-06-24 NOTE — TELEPHONE ENCOUNTER
Question following Office Visit  When did you see your provider: 1/2/19  What is your question: I was told to have the pharmacy return call to increase the dosage if this was not working for me in my appointment. I did that and this was denied by the care connection. Please increase my dosage of the sertraline.   Okay to leave a detailed message: Yes

## 2021-07-02 ENCOUNTER — COMMUNICATION - HEALTHEAST (OUTPATIENT)
Dept: INTERNAL MEDICINE | Facility: CLINIC | Age: 46
End: 2021-07-02

## 2021-07-02 DIAGNOSIS — F41.1 ANXIETY STATE: ICD-10-CM

## 2021-07-02 ASSESSMENT — ENCOUNTER SYMPTOMS
MYALGIAS: 0
PALPITATIONS: 0
FEVER: 0
CHILLS: 0
SHORTNESS OF BREATH: 0
DIZZINESS: 0
DYSURIA: 0
SORE THROAT: 0
JOINT SWELLING: 0
COUGH: 0
WEAKNESS: 0
HEADACHES: 0
DIARRHEA: 0
HEMATURIA: 0
PARESTHESIAS: 0
NAUSEA: 0
ABDOMINAL PAIN: 0
HEMATOCHEZIA: 0
HEARTBURN: 0
ARTHRALGIAS: 0
NERVOUS/ANXIOUS: 1
CONSTIPATION: 0
EYE PAIN: 0
FREQUENCY: 0
BREAST MASS: 0

## 2021-07-08 ENCOUNTER — OFFICE VISIT (OUTPATIENT)
Dept: PEDIATRICS | Facility: CLINIC | Age: 46
End: 2021-07-08
Payer: COMMERCIAL

## 2021-07-08 VITALS
TEMPERATURE: 98.6 F | DIASTOLIC BLOOD PRESSURE: 84 MMHG | HEIGHT: 69 IN | RESPIRATION RATE: 14 BRPM | WEIGHT: 231.4 LBS | BODY MASS INDEX: 34.27 KG/M2 | HEART RATE: 112 BPM | SYSTOLIC BLOOD PRESSURE: 124 MMHG | OXYGEN SATURATION: 99 %

## 2021-07-08 DIAGNOSIS — F41.1 ANXIETY STATE: ICD-10-CM

## 2021-07-08 DIAGNOSIS — Z00.00 ROUTINE GENERAL MEDICAL EXAMINATION AT A HEALTH CARE FACILITY: Primary | ICD-10-CM

## 2021-07-08 DIAGNOSIS — E66.09 CLASS 1 OBESITY DUE TO EXCESS CALORIES WITHOUT SERIOUS COMORBIDITY WITH BODY MASS INDEX (BMI) OF 34.0 TO 34.9 IN ADULT: ICD-10-CM

## 2021-07-08 DIAGNOSIS — E66.811 CLASS 1 OBESITY DUE TO EXCESS CALORIES WITHOUT SERIOUS COMORBIDITY WITH BODY MASS INDEX (BMI) OF 34.0 TO 34.9 IN ADULT: ICD-10-CM

## 2021-07-08 DIAGNOSIS — Z11.59 NEED FOR HEPATITIS C SCREENING TEST: ICD-10-CM

## 2021-07-08 PROCEDURE — 90715 TDAP VACCINE 7 YRS/> IM: CPT | Performed by: NURSE PRACTITIONER

## 2021-07-08 PROCEDURE — 99213 OFFICE O/P EST LOW 20 MIN: CPT | Mod: 25 | Performed by: NURSE PRACTITIONER

## 2021-07-08 PROCEDURE — 86803 HEPATITIS C AB TEST: CPT | Performed by: NURSE PRACTITIONER

## 2021-07-08 PROCEDURE — 90471 IMMUNIZATION ADMIN: CPT | Performed by: NURSE PRACTITIONER

## 2021-07-08 PROCEDURE — 82947 ASSAY GLUCOSE BLOOD QUANT: CPT | Performed by: NURSE PRACTITIONER

## 2021-07-08 PROCEDURE — 99396 PREV VISIT EST AGE 40-64: CPT | Mod: 25 | Performed by: NURSE PRACTITIONER

## 2021-07-08 PROCEDURE — 80061 LIPID PANEL: CPT | Performed by: NURSE PRACTITIONER

## 2021-07-08 PROCEDURE — 36415 COLL VENOUS BLD VENIPUNCTURE: CPT | Performed by: NURSE PRACTITIONER

## 2021-07-08 RX ORDER — HYDROXYZINE HYDROCHLORIDE 25 MG/1
25 TABLET, FILM COATED ORAL 3 TIMES DAILY PRN
Qty: 30 TABLET | Refills: 0 | Status: SHIPPED | OUTPATIENT
Start: 2021-07-08 | End: 2022-06-07

## 2021-07-08 ASSESSMENT — ENCOUNTER SYMPTOMS
CONSTIPATION: 0
WEAKNESS: 0
HEMATURIA: 0
ARTHRALGIAS: 0
DIARRHEA: 0
PALPITATIONS: 0
BREAST MASS: 0
MYALGIAS: 0
HEADACHES: 0
DYSURIA: 0
PARESTHESIAS: 0
JOINT SWELLING: 0
FREQUENCY: 0
DIZZINESS: 0
FEVER: 0
EYE PAIN: 0
HEARTBURN: 0
NERVOUS/ANXIOUS: 1
HEMATOCHEZIA: 0
SHORTNESS OF BREATH: 0
CHILLS: 0
NAUSEA: 0
COUGH: 0
SORE THROAT: 0
ABDOMINAL PAIN: 0

## 2021-07-08 ASSESSMENT — MIFFLIN-ST. JEOR: SCORE: 1751.06

## 2021-07-08 NOTE — PROGRESS NOTES
SUBJECTIVE:   CC: Cristina Eaton is an 46 year old woman who presents for preventive health visit.     Patient has been advised of split billing requirements and indicates understanding: Yes  Healthy Habits:     Getting at least 3 servings of Calcium per day:  NO    Bi-annual eye exam:  Yes    Dental care twice a year:  Yes    Sleep apnea or symptoms of sleep apnea:  None    Diet:  Regular (no restrictions)    Frequency of exercise:  1 day/week    Duration of exercise:  Less than 15 minutes    Taking medications regularly:  Yes    Medication side effects:  None    PHQ-2 Total Score: 1    Additional concerns today:  Yes    Concerns today: meds renewed    History of anxiety, is on zolfot 50 mg. Takes ativan 2-3 times per month, depending.   No flowsheet data found.    Menses irregular. Doesn't use birth control since having her son 13 yrs.     Works at [employer]. Has 13 yr old son.     Today's PHQ-2 Score:   PHQ-2 ( 1999 Pfizer) 7/2/2021   Q1: Little interest or pleasure in doing things 0   Q2: Feeling down, depressed or hopeless 1   PHQ-2 Score 1   Q1: Little interest or pleasure in doing things Not at all   Q2: Feeling down, depressed or hopeless Several days   PHQ-2 Score 1     Abuse: Current or Past (Physical, Sexual or Emotional) - No  Do you feel safe in your environment? Yes    Have you ever done Advance Care Planning? (For example, a Health Directive, POLST, or a discussion with a medical provider or your loved ones about your wishes): No, advance care planning information given to patient to review.  Patient declined advance care planning discussion at this time.    Social History     Tobacco Use     Smoking status: Never Smoker     Smokeless tobacco: Never Used   Substance Use Topics     Alcohol use: Yes     Comment: social     Alcohol Use 7/2/2021   Prescreen: >3 drinks/day or >7 drinks/week? No     Reviewed orders with patient.  Reviewed health maintenance and updated orders accordingly - Yes  Lab work is  in process    Breast Cancer Screening:    FHS-7:   Breast CA Risk Assessment (FHS-7) 7/2/2021   Did any of your first-degree relatives have breast or ovarian cancer? Yes   Did any of your relatives have bilateral breast cancer? No   Did any man in your family have breast cancer? No   Did any woman in your family have breast and ovarian cancer? Yes   Did any woman in your family have breast cancer before age 50 y? No   Do you have 2 or more relatives with breast and/or ovarian cancer? No   Do you have 2 or more relatives with breast and/or bowel cancer? No     Mom's sister had br ca.     Mammogram Screening: Recommended annual mammography  Pertinent mammograms are reviewed under the imaging tab.    History of abnormal Pap smear: NO - age 30-65 PAP every 5 years with negative HPV co-testing recommended  PAP / HPV Latest Ref Rng & Units 2/28/2019   PAP - NIL   HPV 16 DNA NEG:Negative Negative   HPV 18 DNA NEG:Negative Negative   OTHER HR HPV NEG:Negative Negative     Reviewed and updated as needed this visit by clinical staff  Tobacco  Allergies  Meds   Med Hx  Surg Hx  Fam Hx  Soc Hx        Reviewed and updated as needed this visit by Provider  Tobacco   Meds                 Review of Systems   Constitutional: Negative for chills and fever.   HENT: Negative for congestion, ear pain, hearing loss and sore throat.    Eyes: Negative for pain and visual disturbance.   Respiratory: Negative for cough and shortness of breath.    Cardiovascular: Negative for chest pain, palpitations and peripheral edema.   Gastrointestinal: Negative for abdominal pain, constipation, diarrhea, heartburn, hematochezia and nausea.   Breasts:  Negative for tenderness, breast mass and discharge.   Genitourinary: Positive for urgency. Negative for dysuria, frequency, genital sores, hematuria, pelvic pain, vaginal bleeding and vaginal discharge.   Musculoskeletal: Negative for arthralgias, joint swelling and myalgias.   Skin: Negative for  "rash.   Neurological: Negative for dizziness, weakness, headaches and paresthesias.   Psychiatric/Behavioral: Negative for mood changes. The patient is nervous/anxious.           OBJECTIVE:   /84 (BP Location: Right arm, Cuff Size: Adult Large)   Pulse 112   Temp 98.6  F (37  C) (Tympanic)   Resp 14   Ht 1.74 m (5' 8.5\")   Wt 105 kg (231 lb 6.4 oz)   LMP 07/01/2021 (Approximate)   SpO2 99%   BMI 34.67 kg/m    Physical Exam  GENERAL: healthy, alert and no distress  EYES: Eyes grossly normal to inspection, PERRL and conjunctivae and sclerae normal  HENT: ear canals and TM's normal, nose and mouth without ulcers or lesions  NECK: no adenopathy, no asymmetry, masses, or scars and thyroid normal to palpation  RESP: lungs clear to auscultation - no rales, rhonchi or wheezes  CV: regular rate and rhythm, normal S1 S2, no S3 or S4, no murmur, click or rub, no peripheral edema and peripheral pulses strong  MS: no gross musculoskeletal defects noted, no edema  SKIN: no suspicious lesions or rashes  PSYCH: mentation appears normal, affect normal/bright      ASSESSMENT/PLAN:   1. Routine general medical examination at a health care facility    - GLUCOSE  - MA SCREENING DIGITAL BILAT - Future  (s+30); Future  - Lipid panel reflex to direct LDL Non-fasting    2. Need for hepatitis C screening test    - Hepatitis C Screen Reflex to HCV RNA Quant and Genotype    3. Anxiety Disorder NOS  Marginally controlled. She does take ativan 1 mg about 2-3 times per month. PDMP checked, no prescription listed. We discussed she used to use alcohol for her anxiety. Drank 3 white claws per day, but she has stopped drinking during the wk, and only a few on the weekend. We also discussed the risks of benzos. We disucssed the benefit of a therapist to help build tools without medications, she declined. She is willing to try atarax for acute anxiety, and increase zoloft dose. Follow up in a month  - hydrOXYzine (ATARAX) 25 MG tablet; " "Take 1 tablet (25 mg) by mouth 3 times daily as needed for anxiety  Dispense: 30 tablet; Refill: 0  - sertraline (ZOLOFT) 50 MG tablet; Take 2 tablets (100 mg) by mouth daily  Dispense: 180 tablet; Refill: 0    Patient has been advised of split billing requirements and indicates understanding: No  COUNSELING:  Reviewed preventive health counseling, as reflected in patient instructions  Special attention given to:        Regular exercise       Healthy diet/nutrition       Contraception       Family planning       Osteoporosis prevention/bone health       ([place])menopause management    Estimated body mass index is 34.67 kg/m  as calculated from the following:    Height as of this encounter: 1.74 m (5' 8.5\").    Weight as of this encounter: 105 kg (231 lb 6.4 oz).    Weight management plan: Patient referred to endocrine and/or weight management specialty    She reports that she has never smoked. She has never used smokeless tobacco.      Counseling Resources:  ATP IV Guidelines  Pooled Cohorts Equation Calculator  Breast Cancer Risk Calculator  BRCA-Related Cancer Risk Assessment: FHS-7 Tool  FRAX Risk Assessment  ICSI Preventive Guidelines  Dietary Guidelines for Americans, 2010  USDA's MyPlate  ASA Prophylaxis  Lung CA Screening    Concepción Sanchez, STEPHEN [facility] ANNA  "

## 2021-07-08 NOTE — PATIENT INSTRUCTIONS
Nereida Jeter, therapist - let me know if you ever want to connect.    We'll increase zoloft dose and try the atarax for acute anxiety (instead of ativan). Update me in 1 month through [facility].       Preventive Health Recommendations  Female Ages 40 to 49    Yearly exam:     See your health care provider every year in order to  1. Review health changes.   2. Discuss preventive care.    3. Review your medicines if your doctor prescribed any.      Get a Pap test every three years (unless you have an abnormal result and your provider advises testing more often).      If you get Pap tests with HPV test, you only need to test every 5 years, unless you have an abnormal result. You do not need a Pap test if your uterus was removed (hysterectomy) and you have not had cancer.      You should be tested each year for STDs (sexually transmitted diseases), if you're at risk.     Ask your doctor if you should have a mammogram.      Have a colonoscopy (test for colon cancer) if someone in your family has had colon cancer or polyps before age 50.       Have a cholesterol test every 5 years.       Have a diabetes test (fasting glucose) after age 45. If you are at risk for diabetes, you should have this test every 3 years.    Shots: Get a flu shot each year. Get a tetanus shot every 10 years.     Nutrition:     Eat at least 5 servings of fruits and vegetables each day.    Eat whole-grain bread, whole-wheat pasta and brown rice instead of white grains and rice.    Get adequate Calcium and Vitamin D.      Lifestyle    Exercise at least 150 minutes a week (an average of 30 minutes a day, 5 days a week). This will help you control your weight and prevent disease.    Limit alcohol to one drink per day.    No smoking.     Wear sunscreen to prevent skin cancer.    See your dentist every six months for an exam and cleaning.

## 2021-07-09 LAB
CHOLEST SERPL-MCNC: 158 MG/DL
GLUCOSE SERPL-MCNC: 86 MG/DL (ref 70–99)
HCV AB SERPL QL IA: NONREACTIVE
HDLC SERPL-MCNC: 44 MG/DL
LDLC SERPL CALC-MCNC: 79 MG/DL
NONHDLC SERPL-MCNC: 114 MG/DL
TRIGL SERPL-MCNC: 174 MG/DL

## 2021-07-21 ENCOUNTER — ANCILLARY PROCEDURE (OUTPATIENT)
Dept: MAMMOGRAPHY | Facility: CLINIC | Age: 46
End: 2021-07-21
Payer: COMMERCIAL

## 2021-07-21 DIAGNOSIS — Z00.00 ROUTINE GENERAL MEDICAL EXAMINATION AT A HEALTH CARE FACILITY: ICD-10-CM

## 2021-07-21 PROCEDURE — 77067 SCR MAMMO BI INCL CAD: CPT | Mod: TC | Performed by: RADIOLOGY

## 2021-07-22 ENCOUNTER — RECORDS - HEALTHEAST (OUTPATIENT)
Dept: LAB | Facility: CLINIC | Age: 46
End: 2021-07-22

## 2021-07-22 DIAGNOSIS — Z12.31 OTHER SCREENING MAMMOGRAM: ICD-10-CM

## 2021-08-01 ENCOUNTER — MYC MEDICAL ADVICE (OUTPATIENT)
Dept: PEDIATRICS | Facility: CLINIC | Age: 46
End: 2021-08-01

## 2021-08-01 DIAGNOSIS — N93.8 DUB (DYSFUNCTIONAL UTERINE BLEEDING): Primary | ICD-10-CM

## 2021-08-10 ENCOUNTER — HOSPITAL ENCOUNTER (OUTPATIENT)
Dept: ULTRASOUND IMAGING | Facility: CLINIC | Age: 46
Discharge: HOME | End: 2021-08-10
Attending: NURSE PRACTITIONER | Admitting: NURSE PRACTITIONER
Payer: COMMERCIAL

## 2021-08-10 DIAGNOSIS — N93.8 DUB (DYSFUNCTIONAL UTERINE BLEEDING): ICD-10-CM

## 2021-08-10 PROCEDURE — 76830 TRANSVAGINAL US NON-OB: CPT

## 2021-08-24 ENCOUNTER — MYC MEDICAL ADVICE (OUTPATIENT)
Dept: PEDIATRICS | Facility: CLINIC | Age: 46
End: 2021-08-24

## 2021-09-29 ENCOUNTER — OFFICE VISIT (OUTPATIENT)
Dept: OBGYN | Facility: CLINIC | Age: 46
End: 2021-09-29
Payer: COMMERCIAL

## 2021-09-29 VITALS
BODY MASS INDEX: 36.22 KG/M2 | HEIGHT: 68 IN | DIASTOLIC BLOOD PRESSURE: 78 MMHG | WEIGHT: 239 LBS | SYSTOLIC BLOOD PRESSURE: 132 MMHG

## 2021-09-29 DIAGNOSIS — N92.0 MENORRHAGIA WITH REGULAR CYCLE: Primary | Chronic | ICD-10-CM

## 2021-09-29 DIAGNOSIS — Z11.3 SCREEN FOR STD (SEXUALLY TRANSMITTED DISEASE): ICD-10-CM

## 2021-09-29 PROCEDURE — 87591 N.GONORRHOEAE DNA AMP PROB: CPT | Performed by: OBSTETRICS & GYNECOLOGY

## 2021-09-29 PROCEDURE — 87491 CHLMYD TRACH DNA AMP PROBE: CPT | Performed by: OBSTETRICS & GYNECOLOGY

## 2021-09-29 PROCEDURE — 99214 OFFICE O/P EST MOD 30 MIN: CPT | Performed by: OBSTETRICS & GYNECOLOGY

## 2021-09-29 ASSESSMENT — MIFFLIN-ST. JEOR: SCORE: 1777.6

## 2021-09-29 ASSESSMENT — ENCOUNTER SYMPTOMS: DYSURIA: 0

## 2021-09-29 NOTE — PROGRESS NOTES
"    Assessment & Plan     Menorrhagia with regular cycle  - I counseled Pt re: first-line options including OCPs and Mirena IUD.  Pt desires Mirena IUD trial.  - Pt to schedule Mirena IUD insertion during next menses.    Screen for STD (sexually transmitted disease)  - Needs pre-IUD STD screening.  - NEISSERIA GONORRHOEA PCR  - CHLAMYDIA TRACHOMATIS PCR    Review of the result(s) of each unique test - Pap/HPV co-test WNL, Pelvic U/S showing normal 5 mm stripe and normal uterus, benign bilateral simple ovarian cysts (see below)  30 minutes spent on the date of the encounter doing chart review, history and exam, documentation and further activities per the note           No follow-ups on file.    Mani Winchester MD  [facility] ANNA Evans is a 46 year old who presents for the following health issues     HPI     Menstrual Concern  Onset/Duration: Years ago  Description: Has had really heavy periods  Duration of bleeding episodes: 5 days  Frequency of periods: (1st day of one to 1st day of next):  every 27 days  Describe bleeding/flow: Every other menses is very heavy  Clots: YES  Number of pads/day: 12 super tampons        Cramping: moderate  Accompanying Signs & Symptoms:  Lightheadedness: no  Temperature intolerance: no  Nosebleeds/Easy bruising: no  Vaginal Discharge: no  Acne: no  Change in body hair: no  History:  Patient's last menstrual period was 09/06/2021 (within days).  Previous normal periods: YES  Contraceptive use: NO  Sexually active: YES  Any bleeding after intercourse: no  Abnormal PAP Smears: no - not due until 2022  Precipitating or alleviating factors: None  Therapies tried and outcome: None          Review of Systems   Genitourinary: Positive for menstrual problem. Negative for dysuria, pelvic pain and vaginal discharge.            Objective    /78 (BP Location: Right arm, Patient Position: Sitting, Cuff Size: Adult Regular)   Ht 1.727 m (5' 8\")   Wt 108.4 kg " (239 lb)   LMP 09/06/2021 (Within Days)   BMI 36.34 kg/m    Body mass index is 36.34 kg/m .  Physical Exam     Abdomen- Abdomen soft, non-tender. BS normal. No masses, organomegaly, positive findings: obese  Pelvic- Exam chaperoned by nurse, External genitalia normal, Bartholin's glands normal, [place]'s glands normal, Urethral meatus normal, Urethra normal, Bladder normal, Vagina with normal rugae, no abnormal lesions, no abnormal discharge, Normal cervix without lesions or mucopus, no cervical motion tenderness, Uterus normal size, shape, and contour, no masses, non-tender, Adnexa normal size without masses or tenderness bilaterally, Anus normal, GC/Chlam probe was Done      Lab Results   Component Value Date    PAP NIL 02/28/2019   HPV Neg 2/28/2019    U/S:  Results for orders placed or performed during the hospital encounter of 08/10/21   US Pelvic Complete with Transvaginal    Narrative    EXAM: US PELVIC COMPLETE WITH TRANSVAGINAL  LOCATION: [facility]  DATE/TIME: 8/10/2021 5:21 PM    INDICATION:  DUB (dysfunctional uterine bleeding)  COMPARISON: None.  TECHNIQUE: Transabdominal scans were performed. Endovaginal ultrasound was performed to better visualize the adnexa.    FINDINGS:    UTERUS: 7.7 x 4.7 x 5.1 cm. Normal in size and position with no masses.    ENDOMETRIUM: 5 mm. Normal smooth endometrium.    RIGHT OVARY: 7.1 x 6.4 x 6.6 cm. 6.2 x 4.6 x 6.2 cm simple cyst. No internal flow by color Doppler.    LEFT OVARY: 5.3 x 4.8 x 4.3 cm. 4.6 x 3.0 x 3.7 cm simple cyst. No internal flow by duplex Doppler.    No significant free fluid.      Impression    IMPRESSION:  1.  Bilateral simple adnexal cysts. See guidelines below.  2.  Normal endometrium 5 mm in thickness.      Premenopausal asymptomatic simple cyst:    <= 3 cm: Normal, no follow-up.    >3 to <= 5 cm: Benign finding in the physiologic size range. No follow-up is needed.    >5 to <= 7 cm: Benign simple cyst. Clinically  inconsequential finding. Follow-up pelvic ultrasound in 6 months is recommended.    >7 cm: Benign simple cyst. Follow-up pelvic ultrasound in 6 months is recommended.    REFERENCE:  Management of Asymptomatic Ovarian and Other Adnexal Cysts Imaged at US: Society of Radiologists in Ultrasound Consensus Conference Statement. Radiology September 2010; [phone].    Simple Adnexal Cysts: SRU Consensus Conference Update on Follow-up and Reporting. Radiology September 2019. [phone].

## 2021-09-29 NOTE — NURSING NOTE
"Chief Complaint   Patient presents with     IUD       Initial /78 (BP Location: Right arm, Patient Position: Sitting, Cuff Size: Adult Regular)   Ht 1.727 m (5' 8\")   Wt 108.4 kg (239 lb)   LMP 2021 (Within Days)   BMI 36.34 kg/m   Estimated body mass index is 36.34 kg/m  as calculated from the following:    Height as of this encounter: 1.727 m (5' 8\").    Weight as of this encounter: 108.4 kg (239 lb).  BP completed using cuff size: large    Questioned patient about current smoking habits.  Pt. has never smoked.          The following HM Due: NONE    Mark Eaton CMA                "

## 2021-09-30 LAB
C TRACH DNA SPEC QL NAA+PROBE: NEGATIVE
N GONORRHOEA DNA SPEC QL NAA+PROBE: NEGATIVE

## 2021-10-04 ENCOUNTER — MYC MEDICAL ADVICE (OUTPATIENT)
Dept: OBGYN | Facility: CLINIC | Age: 46
End: 2021-10-04

## 2021-10-06 ENCOUNTER — OFFICE VISIT (OUTPATIENT)
Dept: OBGYN | Facility: CLINIC | Age: 46
End: 2021-10-06
Payer: COMMERCIAL

## 2021-10-06 VITALS
HEIGHT: 69 IN | DIASTOLIC BLOOD PRESSURE: 82 MMHG | BODY MASS INDEX: 35.55 KG/M2 | SYSTOLIC BLOOD PRESSURE: 132 MMHG | WEIGHT: 240 LBS

## 2021-10-06 DIAGNOSIS — Z30.430 ENCOUNTER FOR INSERTION OF INTRAUTERINE CONTRACEPTIVE DEVICE: Primary | ICD-10-CM

## 2021-10-06 DIAGNOSIS — N92.0 MENORRHAGIA WITH REGULAR CYCLE: Chronic | ICD-10-CM

## 2021-10-06 DIAGNOSIS — Z01.812 PRE-PROCEDURE LAB EXAM: ICD-10-CM

## 2021-10-06 PROBLEM — Z97.5 IUD (INTRAUTERINE DEVICE) IN PLACE: Status: ACTIVE | Noted: 2021-10-06

## 2021-10-06 LAB — HCG IFA URINE: NEGATIVE

## 2021-10-06 PROCEDURE — 58300 INSERT INTRAUTERINE DEVICE: CPT | Performed by: OBSTETRICS & GYNECOLOGY

## 2021-10-06 PROCEDURE — 84703 CHORIONIC GONADOTROPIN ASSAY: CPT | Performed by: OBSTETRICS & GYNECOLOGY

## 2021-10-06 ASSESSMENT — MIFFLIN-ST. JEOR: SCORE: 1790.07

## 2021-10-06 NOTE — PROGRESS NOTES
PROCEDURE:  IUD Insertion     Indication:  Contraception and Menorrhagia.    No absolute contraindication to IUD.  The procedure was explained.  Informed consent was obtained.     Past History:    Patient's last menstrual period was 10/04/2021 (within days).        OBJECTIVE:   Vital Signs taken today were reviewed on the flowsheet in the electronic medical record.    Pelvic Exam:     External appearance, vagina and cervix normal.   Uterus was normal size, mobile, smooth and nontender.       Adenexae were nontender, without palpable masses.              Labs:   Previous GC/Chlamydia screen negative from:    UPT Neg       Procedure in detail:  IUD Insertion    A sterile speculum was placed in the vagina.  The visualized cervix was painted with Betadine.  No cervical lesions were noted.  The uterus was sounded to 7 cm.  Using sterile technique, the IUD was inserted without difficulty.     Type of IUD:  MIrena     The IUD strings were trimmed to 3 cm.  The patient tolerated the procedure well.     There were no complications and no significant bleeding was noted upon the completion of the exam.        ASSESSMENT:  Encounter Diagnoses   Name Primary?     Encounter for insertion of intrauterine contraceptive device Yes     Menorrhagia with regular cycle      Pre-procedure lab exam           PLAN:  The patient was instructed on string checks.    Mani Winchester MD  [facility]

## 2021-10-06 NOTE — NURSING NOTE
"Chief Complaint   Patient presents with     IUD     Insertion        Initial /82 (BP Location: Right arm, Patient Position: Sitting, Cuff Size: Adult Large)   Ht 1.74 m (5' 8.5\")   Wt 108.9 kg (240 lb)   LMP 2021 (Within Days)   BMI 35.96 kg/m   Estimated body mass index is 35.96 kg/m  as calculated from the following:    Height as of this encounter: 1.74 m (5' 8.5\").    Weight as of this encounter: 108.9 kg (240 lb).  BP completed using cuff size: regular    Questioned patient about current smoking habits.  Pt. has never smoked.          The following HM Due: NONE    Mark Eaton CMA                  "

## 2021-10-10 ENCOUNTER — HEALTH MAINTENANCE LETTER (OUTPATIENT)
Age: 46
End: 2021-10-10

## 2021-10-12 ENCOUNTER — IMMUNIZATION (OUTPATIENT)
Dept: NURSING | Facility: CLINIC | Age: 46
End: 2021-10-12
Payer: COMMERCIAL

## 2021-10-12 PROCEDURE — 0004A PR COVID VAC PFIZER DIL RECON 30 MCG/0.3 ML IM: CPT

## 2021-10-12 PROCEDURE — 91300 PR COVID VAC PFIZER DIL RECON 30 MCG/0.3 ML IM: CPT

## 2022-05-13 ENCOUNTER — OFFICE VISIT (OUTPATIENT)
Dept: PEDIATRICS | Facility: CLINIC | Age: 47
End: 2022-05-13

## 2022-05-13 ENCOUNTER — ANCILLARY PROCEDURE (OUTPATIENT)
Dept: GENERAL RADIOLOGY | Facility: CLINIC | Age: 47
End: 2022-05-13
Attending: STUDENT IN AN ORGANIZED HEALTH CARE EDUCATION/TRAINING PROGRAM
Payer: COMMERCIAL

## 2022-05-13 VITALS
TEMPERATURE: 97.9 F | OXYGEN SATURATION: 97 % | RESPIRATION RATE: 16 BRPM | WEIGHT: 240 LBS | BODY MASS INDEX: 35.96 KG/M2 | HEART RATE: 94 BPM

## 2022-05-13 DIAGNOSIS — R05.9 COUGH: ICD-10-CM

## 2022-05-13 DIAGNOSIS — R05.9 COUGH: Primary | ICD-10-CM

## 2022-05-13 PROCEDURE — 71046 X-RAY EXAM CHEST 2 VIEWS: CPT | Mod: TC | Performed by: RADIOLOGY

## 2022-05-13 PROCEDURE — 99214 OFFICE O/P EST MOD 30 MIN: CPT | Performed by: STUDENT IN AN ORGANIZED HEALTH CARE EDUCATION/TRAINING PROGRAM

## 2022-05-13 RX ORDER — ALBUTEROL SULFATE 90 UG/1
2 AEROSOL, METERED RESPIRATORY (INHALATION) EVERY 6 HOURS PRN
Qty: 18 G | Refills: 1 | Status: SHIPPED | OUTPATIENT
Start: 2022-05-13 | End: 2023-09-21

## 2022-05-13 ASSESSMENT — ENCOUNTER SYMPTOMS: COUGH: 1

## 2022-05-13 ASSESSMENT — PAIN SCALES - GENERAL: PAINLEVEL: NO PAIN (0)

## 2022-05-13 NOTE — PROGRESS NOTES
Assessment & Plan     Cough  Possible reactive airway disease and seasonal allergic rhinitis contributing. No wheezing and do not think systemic steroids indicated. Recommend trial of MOSHE inhaler, reg antihistamine use, and Flonase (fluticasone) nasal spray. If not improving could consider azithomycin (Zithromax) though lower suspicion for Mycoplasma or pertussis. Discussed plan of care and reasons to return to clinic or present to the ED (emergency department). Patient and/or guardian in agreement with plan.  - XR Chest 2 Views; Future  - albuterol (PROAIR HFA/PROVENTIL HFA/VENTOLIN HFA) 108 (90 Base) MCG/ACT inhaler; Inhale 2 puffs into the lungs every 6 hours as needed for shortness of breath / dyspnea or wheezing      rFansisca Sanon MD  [facility] ANNA Evans is a 47 year old who presents for the following health issues     Cough    History of Present Illness       Reason for visit:  Chronic cough worse in morning and at night  Symptom onset:  More than a month  Symptom intensity:  Moderate  Symptom progression:  Staying the same  Had these symptoms before:  No  What makes it worse:  Deep breaths in  What makes it better:  Cough medicine, cough drops, Vicks vaporub    She eats 2-3 servings of fruits and vegetables daily.She consumes 1 sweetened beverage(s) daily.She exercises with enough effort to increase her heart rate 20 to 29 minutes per day.  She exercises with enough effort to increase her heart rate 3 or less days per week.   She is taking medications regularly.     14 year old kid had influenza A on [place] but cough started before then  Takes Allegra (fexofenadine) seasonally   Used to have an inhaler PRN albuterol  -has not needed it for 14 years  Many many negative COVID19 tests    Review of Systems   Respiratory: Positive for cough.            Objective    Pulse 94   Temp 97.9  F (36.6  C) (Tympanic)   Resp 16   Wt 108.9 kg (240 lb)   LMP 05/02/2022   SpO2 97%    BMI 35.96 kg/m    Body mass index is 35.96 kg/m .  Physical Exam   GENERAL: healthy, alert and no distress  HENT: ear canals and TM's normal  RESP: lungs clear to auscultation - no rales, rhonchi or wheezes; no increase work of breathing; speaking in full sentences; infrequent dry cough

## 2022-05-13 NOTE — PATIENT INSTRUCTIONS
Try taking Flonase (fluticasone) nasal spray: 1 puff per nostril daily and Zyrtec (cetirizine) 10mg tablet daily  -these are available over the counter

## 2022-05-23 ENCOUNTER — HOSPITAL ENCOUNTER (EMERGENCY)
Dept: HOSPITAL 92 - ERS | Age: 47
Discharge: HOME | End: 2022-05-23
Payer: COMMERCIAL

## 2022-05-23 DIAGNOSIS — J44.9: ICD-10-CM

## 2022-05-23 DIAGNOSIS — S83.92XA: Primary | ICD-10-CM
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
## 2022-05-23 PROCEDURE — 96372 THER/PROPH/DIAG INJ SC/IM: CPT

## 2022-06-07 ENCOUNTER — VIRTUAL VISIT (OUTPATIENT)
Dept: FAMILY MEDICINE | Facility: CLINIC | Age: 47
End: 2022-06-07
Payer: COMMERCIAL

## 2022-06-07 DIAGNOSIS — U07.1 INFECTION DUE TO 2019 NOVEL CORONAVIRUS: Primary | ICD-10-CM

## 2022-06-07 PROCEDURE — 99214 OFFICE O/P EST MOD 30 MIN: CPT | Mod: 95 | Performed by: PHYSICIAN ASSISTANT

## 2022-06-07 NOTE — PROGRESS NOTES
"Cristina is a 47 year old who is being evaluated via a billable telephone visit.      How would you like to obtain your AVS? MyChart  If the video visit is dropped, the invitation should be resent by: Text to cell phone: [phone]  Will anyone else be joining your video visit? No    Video Start Time: 11:12 AM    Assessment & Plan     Infection due to 2019 novel coronavirus  Patient is a 47 YOF who presents to clinic due to symptoms of COVID-19 starting 2 day(s) ago with subsequent positive test results.  Patient is able to speak in full sentences-no signs of respiratory distress. Per CDC criteria, patient qualifies for prescribed treatment of COVID19. Discussed treatment options for COVID-19 as well as risks and benefits.  Patient elects to proceed with Paxlovid.  Discussed home medications and possible interactions.  Also discussed OTC options for managing symptoms of COVID-19.  Return and urgent/emergent follow-up precautions provided.    - nirmatrelvir and ritonavir (PAXLOVID) therapy pack; Take 3 tablets by mouth 2 times daily for 5 days Take 2 Nirmatrelvir tablets and 1 Ritonavir tablet twice daily for 5 days.      COVID-19 positive patient.  Encounter for consideration of medication intervention. Patient does qualify for a prescription. Full discussion with patient including medication options, risks and benefits. Potential drug interactions reviewed with patient.     Treatment Planned Paxlovid, Rx sent to [place] pharmacy  [facility] [phone]    [address] [place] [address] , [address]  [place] [address]    Hours:  Mon-Fri: 8:00am - 6:00p  Sat: 9:00a - 12:30p     Drive Thru available  Temporary change to home medications:  None     Estimated body mass index is 35.96 kg/m  as calculated from the following:    Height as of 10/6/21: 1.74 m (5' 8.5\").    Weight as of 5/13/22: 108.9 kg (240 lb).  GFR Estimate   Date Value Ref Range Status   01/02/2019 >60 >60 mL/min/1.73m2 Final     No results found for: " [id number]    Return in about 1 week (around 6/14/2022), or if symptoms worsen or fail to improve.    Misti Albright PA-C  [facility] Evans is a 47 year old who presents for the following health issues     HPI       COVID-19 Symptom Review  How many days ago did these symptoms start? 6/5/22    Are any of the following symptoms significant for you?    New or worsening difficulty breathing? No    Worsening cough? Yes, I am coughing up mucus    Fever or chills? Yes, the highest temperature was 99    Headache: YES    Sore throat: no    Chest pain: YES- when coughing    Diarrhea: YES    Body aches? YES    What treatments has patient tried? Dayquil, nyquil , zinc, vitamin d   Does patient live in a nursing home, group home, or shelter? no  Does patient have a way to get food/medications during quarantined? Yes, I have a friend or family member who can help me.          Objective           Vitals:  No vitals were obtained today due to virtual visit.    Physical Exam   GENERAL: Healthy, alert and no distress  EYES: Eyes grossly normal to inspection.  No discharge or erythema, or obvious scleral/conjunctival abnormalities.  RESP: Dry cough. No audible wheeze, or visible cyanosis.  No visible retractions or increased work of breathing.    SKIN: Visible skin clear. No significant rash, abnormal pigmentation or lesions.  NEURO: Cranial nerves grossly intact.  Mentation and speech appropriate for age.  PSYCH: Mentation appears normal, affect normal/bright, judgement and insight intact, normal speech and appearance well-groomed.            Video-Visit Details    Type of service:  Video Visit    Video End Time:11:25 AM    Originating Location (pt. Location): Home    Distant Location (provider location):  [facility]     Platform used for Video Visit: Unable to complete video visit

## 2022-06-07 NOTE — PATIENT INSTRUCTIONS
Eamon Evans,     Based on your health history you do qualify for treatment of COVID-19.  For treatment you have been prescribed Paxlovid.  Paxlovid is a combined antiviral medication that reduces risk of hospitalization or severe COVID by 90%.  It is important that you  this medication and start it right away today.  The medication was sent to the [facility] pharmacy.  Please see below for pharmacy information.    While taking Paxlovid, you can continue medications as prescribed.     You may continue to use Tylenol/ibuprofen fevers, headache, body ache.  You can also use over-the-counter decongestants and cough suppressants to help manage symptoms.    If you develop any severe symptoms of medication reaction or COVID-19 including chest pain, coughing up blood, shortness of breath, swelling, rashes, or any other severe symptoms, please call 911/go to the emergency department.    Please reach out with any questions or concerns.  Take Misti Hess PA-C    Treatment Planned Paxlovid, Rx sent to [facility] [phone] [address] , [address]  [place] [address]    Hours:  Mon-Fri: 8:00am - 6:00p  Sat: 9:00a - 12:30p     Drive Thru available  Discharge Instructions for COVID-19 Patients  You have--or may have--COVID-19. Please follow the instructions listed below.   If you have a weakened immune system, discuss with your doctor any other actions you need to take.  How can I protect others?  If you have symptoms (fever, cough, body aches or trouble breathing):  Stay home and away from others (self-isolate) until:  Your other symptoms have resolved (gotten better). And   You've had no fever--and no medicine that reduces fever--for 1 full day (24 hours). And   At least 10 days have passed since your symptoms started. (You may need to wait 20 days. Follow the advice of your care team.)  If you don't show symptoms, but testing showed that you have COVID-19:  Stay home  "and away from others (self-isolate) until at least 10 days have passed since the date of your first positive COVID-19 test.  During this time  Stay in your own room, even for meals. Use your own bathroom if you can.  Stay away from others in your home. No hugging, kissing or shaking hands. No visitors.  Don't go to work, school or anywhere else.  Clean \"high touch\" surfaces often (doorknobs, counters, handles). Use household cleaning spray or wipes.  You'll find a full list of  on the EPA website: www.epa.gov/pesticide-registration/list-n-disinfectants-use-against-sars-cov-2.  Cover your mouth and nose with a mask or other face covering to avoid spreading germs.  Wash your hands and face often. Use soap and water.  Caregivers in these groups are at risk for severe illness due to COVID-19:  People 65 years and older  People who live in a nursing home or long-term care facility  People with chronic disease (lung, heart, cancer, diabetes, kidney, liver, immunologic)  People who have a weakened immune system, including those who:  Are in cancer treatment  Take medicine that weakens the immune system, such as corticosteroids  Had a bone marrow or organ transplant  Have an immune deficiency  Have poorly controlled HIV or AIDS  Are obese (body mass index of 40 or higher)  Smoke regularly  Caregivers should wear gloves while washing dishes, handling laundry and cleaning bedrooms and bathrooms.  Use caution when washing and drying laundry: Don't shake dirty laundry and use the warmest water setting that you can.  For more tips on managing your health at home, go to www.cdc.gov/coronavirus/2019-ncov/downloads/10Things.pdf.  How can I take care of myself at home?  Get lots of rest. Drink extra fluids (unless a doctor has told you not to).  Take Tylenol (acetaminophen) for fever or pain. If you have liver or kidney problems, ask your family doctor if it's okay to take Tylenol.   Adults can take either:   650 mg (two 325 " mg pills) every 4 to 6 hours, or   1,000 mg (two 500 mg pills) every 8 hours as needed.  Note: Don't take more than 3,000 mg in one day. Acetaminophen is found in many medicines (both prescribed and over-the-counter medicines). Read all labels to be sure you don't take too much.   For children, check the Tylenol bottle for the right dose. The dose is based on the child's age or weight.  If you have other health problems (like cancer, heart failure, an organ transplant or severe kidney disease): Call your specialty clinic if you don't feel better in the next 2 days.  Know when to call 911. Emergency warning signs include:  Trouble breathing or shortness of breath  Pain or pressure in the chest that doesn't go away  Feeling confused like you haven't felt before, or not being able to wake up  Bluish-colored lips or face  Your doctor may have prescribed a blood thinner medicine. Follow their instructions.  Where can I get more information?  [facility] - About COVID-19:   [email or web address]  CDC - What to Do If You're Sick: www.cdc.gov/coronavirus/2019-ncov/about/steps-when-sick.html  CDC - Ending Home Isolation: www.cdc.gov/coronavirus/2019-ncov/hcp/disposition-in-home-patients.html  CDC - Caring for Someone: www.cdc.gov/coronavirus/2019-ncov/if-you-are-sick/care-for-someone.html  [facility] - Interim Guidance for Hospital Discharge to Home: www.health.[facility].[place]/diseases/coronavirus/hcp/hospdischarge.pdf  Below are the COVID-19 hotlines at the [place] Department of Health ([facility]). Interpreters are available.  For health questions: Call 826-364-4700 or 1-220.864.8469 (7 a.m. to 7 p.m.)  For questions about schools and childcare: Call 947-607-9403 or 1-700.654.1231 (7 a.m. to 7 p.m.)    For informational purposes only. Not to replace the advice of your health care provider. Clinically reviewed by Dr. Kalen Hutchison.   Copyright   2020 [facility]. All rights reserved. [employer] [id number] -  REV 01/05/21.

## 2022-08-24 ENCOUNTER — E-VISIT (OUTPATIENT)
Dept: PEDIATRICS | Facility: CLINIC | Age: 47
End: 2022-08-24
Payer: COMMERCIAL

## 2022-08-24 DIAGNOSIS — F32.A ANXIETY AND DEPRESSION: Primary | ICD-10-CM

## 2022-08-24 DIAGNOSIS — F41.9 ANXIETY AND DEPRESSION: Primary | ICD-10-CM

## 2022-08-24 DIAGNOSIS — F41.1 ANXIETY STATE: ICD-10-CM

## 2022-08-24 PROCEDURE — 99421 OL DIG E/M SVC 5-10 MIN: CPT | Performed by: NURSE PRACTITIONER

## 2022-08-24 ASSESSMENT — ANXIETY QUESTIONNAIRES
2. NOT BEING ABLE TO STOP OR CONTROL WORRYING: SEVERAL DAYS
5. BEING SO RESTLESS THAT IT IS HARD TO SIT STILL: NOT AT ALL
7. FEELING AFRAID AS IF SOMETHING AWFUL MIGHT HAPPEN: MORE THAN HALF THE DAYS
8. IF YOU CHECKED OFF ANY PROBLEMS, HOW DIFFICULT HAVE THESE MADE IT FOR YOU TO DO YOUR WORK, TAKE CARE OF THINGS AT HOME, OR GET ALONG WITH OTHER PEOPLE?: SOMEWHAT DIFFICULT
7. FEELING AFRAID AS IF SOMETHING AWFUL MIGHT HAPPEN: MORE THAN HALF THE DAYS
GAD7 TOTAL SCORE: 8
3. WORRYING TOO MUCH ABOUT DIFFERENT THINGS: SEVERAL DAYS
4. TROUBLE RELAXING: SEVERAL DAYS
GAD7 TOTAL SCORE: 8
GAD7 TOTAL SCORE: 8
1. FEELING NERVOUS, ANXIOUS, OR ON EDGE: MORE THAN HALF THE DAYS
6. BECOMING EASILY ANNOYED OR IRRITABLE: SEVERAL DAYS

## 2022-08-24 ASSESSMENT — PATIENT HEALTH QUESTIONNAIRE - PHQ9
10. IF YOU CHECKED OFF ANY PROBLEMS, HOW DIFFICULT HAVE THESE PROBLEMS MADE IT FOR YOU TO DO YOUR WORK, TAKE CARE OF THINGS AT HOME, OR GET ALONG WITH OTHER PEOPLE: SOMEWHAT DIFFICULT
SUM OF ALL RESPONSES TO PHQ QUESTIONS 1-9: 8
SUM OF ALL RESPONSES TO PHQ QUESTIONS 1-9: 8

## 2022-08-25 RX ORDER — BUPROPION HYDROCHLORIDE 150 MG/1
150 TABLET ORAL EVERY MORNING
Qty: 90 TABLET | Refills: 0 | Status: SHIPPED | OUTPATIENT
Start: 2022-08-25 | End: 2022-11-28

## 2022-08-25 ASSESSMENT — PATIENT HEALTH QUESTIONNAIRE - PHQ9: SUM OF ALL RESPONSES TO PHQ QUESTIONS 1-9: 8

## 2022-08-25 ASSESSMENT — ANXIETY QUESTIONNAIRES: GAD7 TOTAL SCORE: 8

## 2022-08-25 NOTE — TELEPHONE ENCOUNTER
Please reach out and schedule mood follow up in 6-8 weeks with myself or Concepción (VRT is ok)  STEPHEN Tan, CNP

## 2022-08-30 ENCOUNTER — ANCILLARY PROCEDURE (OUTPATIENT)
Dept: MAMMOGRAPHY | Facility: CLINIC | Age: 47
End: 2022-08-30
Attending: NURSE PRACTITIONER
Payer: COMMERCIAL

## 2022-08-30 DIAGNOSIS — Z12.31 VISIT FOR SCREENING MAMMOGRAM: ICD-10-CM

## 2022-08-30 PROCEDURE — 77067 SCR MAMMO BI INCL CAD: CPT | Mod: TC | Performed by: RADIOLOGY

## 2022-09-18 ENCOUNTER — HEALTH MAINTENANCE LETTER (OUTPATIENT)
Age: 47
End: 2022-09-18

## 2022-10-20 ENCOUNTER — MYC MEDICAL ADVICE (OUTPATIENT)
Dept: PEDIATRICS | Facility: CLINIC | Age: 47
End: 2022-10-20

## 2022-10-20 DIAGNOSIS — E66.811 CLASS 1 OBESITY DUE TO EXCESS CALORIES WITHOUT SERIOUS COMORBIDITY WITH BODY MASS INDEX (BMI) OF 34.0 TO 34.9 IN ADULT: Primary | ICD-10-CM

## 2022-10-20 DIAGNOSIS — E66.09 CLASS 1 OBESITY DUE TO EXCESS CALORIES WITHOUT SERIOUS COMORBIDITY WITH BODY MASS INDEX (BMI) OF 34.0 TO 34.9 IN ADULT: Primary | ICD-10-CM

## 2022-10-20 NOTE — TELEPHONE ENCOUNTER
Concepción,    Please see  message  Referral placed- please review    Thank you  Svetlana Do RN on 10/20/2022 at 8:30 AM

## 2022-11-26 DIAGNOSIS — F41.1 ANXIETY STATE: ICD-10-CM

## 2022-11-26 DIAGNOSIS — F41.9 ANXIETY AND DEPRESSION: ICD-10-CM

## 2022-11-26 DIAGNOSIS — F32.A ANXIETY AND DEPRESSION: ICD-10-CM

## 2022-11-28 RX ORDER — BUPROPION HYDROCHLORIDE 150 MG/1
TABLET ORAL
Qty: 90 TABLET | Refills: 0 | Status: SHIPPED | OUTPATIENT
Start: 2022-11-28 | End: 2023-03-13

## 2022-11-28 NOTE — TELEPHONE ENCOUNTER
Routing refill request to provider for review/approval because:  Labs out of range:  PHQ9    PHQ-9 score:    PHQ 8/24/2022   PHQ-9 Total Score 8   Q9: Thoughts of better off dead/self-harm past 2 weeks Not at all       Harshal Garrido RN on 11/28/2022 at 2:04 PM

## 2023-01-26 ENCOUNTER — OFFICE VISIT (OUTPATIENT)
Dept: SURGERY | Facility: CLINIC | Age: 48
End: 2023-01-26
Attending: NURSE PRACTITIONER
Payer: COMMERCIAL

## 2023-01-26 ENCOUNTER — LAB (OUTPATIENT)
Dept: LAB | Facility: CLINIC | Age: 48
End: 2023-01-26
Payer: COMMERCIAL

## 2023-01-26 VITALS
DIASTOLIC BLOOD PRESSURE: 90 MMHG | WEIGHT: 245.5 LBS | HEIGHT: 70 IN | BODY MASS INDEX: 35.15 KG/M2 | SYSTOLIC BLOOD PRESSURE: 138 MMHG

## 2023-01-26 DIAGNOSIS — E66.09 CLASS 1 OBESITY DUE TO EXCESS CALORIES WITHOUT SERIOUS COMORBIDITY WITH BODY MASS INDEX (BMI) OF 34.0 TO 34.9 IN ADULT: ICD-10-CM

## 2023-01-26 DIAGNOSIS — M25.561 CHRONIC PAIN OF BOTH KNEES: ICD-10-CM

## 2023-01-26 DIAGNOSIS — R63.8 ABNORMAL CRAVING: ICD-10-CM

## 2023-01-26 DIAGNOSIS — E66.812 CLASS 2 OBESITY WITH BODY MASS INDEX (BMI) OF 35.0 TO 35.9 IN ADULT, UNSPECIFIED OBESITY TYPE, UNSPECIFIED WHETHER SERIOUS COMORBIDITY PRESENT: Primary | ICD-10-CM

## 2023-01-26 DIAGNOSIS — G89.29 CHRONIC PAIN OF BOTH KNEES: ICD-10-CM

## 2023-01-26 DIAGNOSIS — K21.9 GASTROESOPHAGEAL REFLUX DISEASE WITHOUT ESOPHAGITIS: ICD-10-CM

## 2023-01-26 DIAGNOSIS — M25.562 CHRONIC PAIN OF BOTH KNEES: ICD-10-CM

## 2023-01-26 DIAGNOSIS — J45.20 MILD INTERMITTENT ASTHMA WITHOUT COMPLICATION: ICD-10-CM

## 2023-01-26 DIAGNOSIS — E66.811 CLASS 1 OBESITY DUE TO EXCESS CALORIES WITHOUT SERIOUS COMORBIDITY WITH BODY MASS INDEX (BMI) OF 34.0 TO 34.9 IN ADULT: ICD-10-CM

## 2023-01-26 PROBLEM — J45.909 ASTHMA: Status: ACTIVE | Noted: 2023-01-26

## 2023-01-26 PROBLEM — M25.569 KNEE PAIN: Status: ACTIVE | Noted: 2023-01-26

## 2023-01-26 PROBLEM — E66.9 OBESITY: Status: ACTIVE | Noted: 2021-07-08

## 2023-01-26 PROBLEM — E66.01 MORBID OBESITY (H): Status: ACTIVE | Noted: 2023-01-26

## 2023-01-26 LAB
ALBUMIN SERPL BCG-MCNC: 4.4 G/DL (ref 3.5–5.2)
ALP SERPL-CCNC: 83 U/L (ref 35–104)
ALT SERPL W P-5'-P-CCNC: 37 U/L (ref 10–35)
ANION GAP SERPL CALCULATED.3IONS-SCNC: 11 MMOL/L (ref 7–15)
AST SERPL W P-5'-P-CCNC: 33 U/L (ref 10–35)
BILIRUB SERPL-MCNC: 0.8 MG/DL
BUN SERPL-MCNC: 16.2 MG/DL (ref 6–20)
CALCIUM SERPL-MCNC: 9.1 MG/DL (ref 8.6–10)
CHLORIDE SERPL-SCNC: 103 MMOL/L (ref 98–107)
CREAT SERPL-MCNC: 0.75 MG/DL (ref 0.51–0.95)
DEPRECATED CALCIDIOL+CALCIFEROL SERPL-MC: 32 UG/L (ref 20–75)
DEPRECATED HCO3 PLAS-SCNC: 26 MMOL/L (ref 22–29)
FERRITIN SERPL-MCNC: 43 NG/ML (ref 6–175)
GFR SERPL CREATININE-BSD FRML MDRD: >90 ML/MIN/1.73M2
GLUCOSE SERPL-MCNC: 95 MG/DL (ref 70–99)
POTASSIUM SERPL-SCNC: 3.6 MMOL/L (ref 3.4–5.3)
PROT SERPL-MCNC: 7.2 G/DL (ref 6.4–8.3)
PTH-INTACT SERPL-MCNC: 39 PG/ML (ref 15–65)
SODIUM SERPL-SCNC: 140 MMOL/L (ref 136–145)
TSH SERPL DL<=0.005 MIU/L-ACNC: 1.26 UIU/ML (ref 0.3–4.2)
VIT B12 SERPL-MCNC: 558 PG/ML (ref 232–1245)

## 2023-01-26 PROCEDURE — 84443 ASSAY THYROID STIM HORMONE: CPT

## 2023-01-26 PROCEDURE — 83970 ASSAY OF PARATHORMONE: CPT

## 2023-01-26 PROCEDURE — 80053 COMPREHEN METABOLIC PANEL: CPT

## 2023-01-26 PROCEDURE — 82306 VITAMIN D 25 HYDROXY: CPT

## 2023-01-26 PROCEDURE — 82728 ASSAY OF FERRITIN: CPT

## 2023-01-26 PROCEDURE — 99215 OFFICE O/P EST HI 40 MIN: CPT | Performed by: FAMILY MEDICINE

## 2023-01-26 PROCEDURE — 36415 COLL VENOUS BLD VENIPUNCTURE: CPT

## 2023-01-26 PROCEDURE — 82607 VITAMIN B-12: CPT

## 2023-01-26 RX ORDER — SEMAGLUTIDE 0.25 MG/.5ML
0.25 INJECTION, SOLUTION SUBCUTANEOUS WEEKLY
Qty: 2 ML | Refills: 0 | Status: SHIPPED | OUTPATIENT
Start: 2023-01-26 | End: 2023-02-23

## 2023-01-26 RX ORDER — NALTREXONE HYDROCHLORIDE 50 MG/1
TABLET, FILM COATED ORAL
Qty: 45 TABLET | Refills: 3 | Status: SHIPPED | OUTPATIENT
Start: 2023-01-26 | End: 2023-09-21

## 2023-01-26 RX ORDER — PHENTERMINE HYDROCHLORIDE 37.5 MG/1
18.75-37.5 TABLET ORAL
Qty: 90 TABLET | Refills: 1 | Status: SHIPPED | OUTPATIENT
Start: 2023-01-26 | End: 2023-04-26

## 2023-01-26 NOTE — PROGRESS NOTES
"    New Medical Weight Management Consult    PATIENT:  Cristina Eaton  MRN:         [id number]  :         1975  KELLY:         2023    Dear STEPHEN Eid, CNP,    I had the pleasure of seeing your patient, Cristina Eaton. Full intake/assessment was done to determine barriers to weight loss success and develop a treatment plan. Cristina Eaton is a 47 year old female interested in treatment of medical problems associated with excess weight. She has a height of 5' 9.5\", a weight of 245 lbs 8 oz, and the calculated Body mass index is 35.73 kg/m .    ASSESSMENT/PLAN:  Sedentary lifestyle  Optional alcohol calories ramped up during COVID and continue  Lipids, glucose and BP OK    Phentermine 1/2-1 tab in the am  Naltrexone 25mg with dinner  Goals: eat protein first and cut alcoholic beverages in 1/2 to <10/wk  Labs  Dietitian  Consider 24 week program  She will check in to [employer] coverage.  did not come up with information    She has the following co-morbidities:       2023   I have the following health issues associated with obesity: None of the above   I have the following symptoms associated with obesity: Knee Pain, Depression, Back Pain, Hip Pain       Patient Goals 2023   I am interested in having a healthier weight to diminish current health problems: Yes   I am interested in having a healthier weight in order to prevent future health problems: Yes   I am interested in having a healthier weight in order to have a future surgery: No       Referring Provider 2023   Please name the provider who referred you to Medical Weight Management.  If you do not know, please answer: \"I Don't Know\". Renée Beebe (friend)       Weight History 2023   How concerned are you about your weight? Very Concerned   Would you describe your weight gain as gradual? Yes   I became overweight: As a Child   The following factors have contributed to my weight gain:  Mental Health Issues, " Eating Wrong Types of Food, Eating Too Much, Lack of Exercise, Stress   I have tried the following methods to lose weight: Watching Portions or Calories, Weight Watchers, Atkins-type Diet (Low Carb/High Protein), Slim Fast or Other Liquid Diets, Meal Replacements   My lowest weight since age 19 was: 170   My highest weight since age 19 was: 252   The most weight I have ever lost was: (lbs) 40   I have the following family history of obesity/being overweight:  I am the only one in my immediate family who is overweight   Has anyone in your family had weight loss surgery? No   How has your weight changed over the last year?  Gained   How many pounds? 25       Diet Recall Review with Patient 1/23/2023   Do you typically eat breakfast? No   Do you typically eat lunch? Yes   If you do eat lunch, what types of food do you typically eat?  chips crackers cheese, or lean cuisine   Do you typically eat supper? Yes   If you do eat supper, what types of food do you typically eat? quick and easy, frozen pizza, or meat and bread   Do you typically eat snacks? Yes   If you do snack, what types of food do you typically eat? chips crackers cheese   Do you like vegetables?  No   Do you drink water? Yes   How many glasses of juice do you drink in a typical day? 0   How many of glasses of milk do you drink in a typical day? 0   How many 8oz glasses of sugar containing drinks such as Chris-Aid/sweet tea do you drink in a day? 0   How many cans/bottles of sugar pop/soda/tea/sports drinks do you drink in a day? 0   How many cans/bottles of diet pop/soda/tea or sports drink do you drink in a day? 3   How often do you have a drink of alcohol? 2-3 TImes a Week   If you do drink, how many drinks might you have in a day? 3-4       Eating Habits 1/23/2023   Generally, my meals include foods like these: bread, pasta, rice, potatoes, corn, crackers, sweet dessert, pop, or juice. A Few Times a Week   Generally, my meals include foods like these:  fried meats, brats, burgers, french fries, pizza, cheese, chips, or ice cream. Almost Everyday   Eat fast food (like McDonalds, [employer], [employer]). Once a Week   Eat at a buffet or sit-down restaurant. Never   Eat most of my meals in front of the TV or computer. Everyday   Often skip meals, eat at random times, have no regular eating times. Never   Rarely sit down for a meal but snack or graze throughout.  Once a Week   Eat extra snacks between meals. Everyday   Eat most of my food at the end of the day. A Few Times a Week   Eat in the middle of the night or wake up at night to eat. Never   Eat extra snacks to prevent or correct low blood sugar. Never   Eat to prevent acid reflux or stomach pain. Never   Worry about not having enough food to eat. Never   Have you been to the food shelf at least a few times this year? No   I eat when I am depressed. Once a Week   I eat when I am stressed. A Few Times a Week   I eat when I am bored. Almost Everyday   I eat when I am anxious. Almost Everyday   I eat when I am happy or as a reward. Once a Week   I feel hungry all the time even if I just have eaten. Once a Week   Feeling full is important to me. Almost Everyday   I finish all the food on my plate even if I am already full. A Few Times a Week   I can't resist eating delicious food or walk past the good food/smell. Everyday   I eat/snack without noticing that I am eating. Everyday   I eat when I am preparing the meal. Less Than Weekly   I eat more than usual when I see others eating. A Few Times a Week   I have trouble not eating sweets, ice cream, cookies, or chips if they are around the house. Less Than Weekly   I think about food all day. Almost Everyday   What foods, if any, do you crave? Chips/Crackers   Please list any other foods you crave? cheese       Amount of Food 1/23/2023   I make myself vomit what I have eaten or use laxatives to get rid of food. Never   I eat a large amount of food, like a loaf of  bread, a box of cookies, a pint/quart of ice cream, all at once. Monthly   I eat a large amount of food even when I am not hungry. Weekly   I eat rapidly. Weekly   I eat alone because I feel embarrassed and do not want others to see how much I have eaten. Weekly   I eat until I am uncomfortably full. Monthly   I feel bad, disgusted, or guilty after I overeat. Almost Everyday   I make myself vomit what I have eaten or use laxatives to get rid of food. Never       Activity/Exercise History 1/23/2023   How much of a typical 12 hour day do you spend sitting? Most of the Day   How much of a typical 12 hour day do you spend lying down? Less Than Half the Day   How much of a typical day do you spend walking/standing? Less Than Half the Day   How many hours (not including work) do you spend on the TV/Video Games/Computer/Tablet/Phone? 4-5 Hours   How many times a week are you active for the purpose of exercise? Once a Week   What keeps you from being more active? Pain, Shortness of Breath, Worried People Will Look At Me   How many total minutes do you spend doing some activity for the purpose of exercising when you exercise? 15-30 Minutes       PAST MEDICAL HISTORY:  Past Medical History:   Diagnosis Date     Anxiety 2009     Asthma      Gastroesophageal reflux disease without esophagitis      Knee pain      Menorrhagia with regular cycle 09/29/2021     Obesity        Work/Social History Reviewed With Patient 1/23/2023   My employment status is: Full-Time   My job is: [employer]   How much of your job is spent on the computer or phone? 100%   How many hours do you spend commuting to work daily?  .3   What is your marital status? /In a Relationship   If in a relationship, is your significant other overweight? No   Do you have children? Yes   If you have children, are they overweight? No   Who do you live with?   and son   Are they supportive of your health goals? Yes   Who does the food  "shopping?  me       Mental Health History Reviewed With Patient 1/23/2023   Have you ever been physically or sexually abused? No   If yes, do you feel that the abuse is affecting your weight? N/A   If yes, would you like to talk to a counselor about the abuse? N/A   How often in the past 2 weeks have you felt little interest or pleasure in doing things? Not at all   Over the past 2 weeks how often have you felt down, depressed, or hopeless? Not at all       Sleep History Reviewed With Patient 1/23/2023   How many hours do you sleep at night? 6   Do you think that you snore loudly or has anybody ever heard you snore loudly (louder than talking or so loud it can be heard behind a shut door)? Yes   Has anyone seen or heard you stop breathing during your sleep? No   Do you often feel tired, fatigued, or sleepy during the day? No   Do you have a TV/Computer in your bedroom? Yes       MEDICATIONS:   Current Outpatient Medications   Medication Sig Dispense Refill     albuterol (PROAIR HFA/PROVENTIL HFA/VENTOLIN HFA) 108 (90 Base) MCG/ACT inhaler Inhale 2 puffs into the lungs every 6 hours as needed for shortness of breath / dyspnea or wheezing 18 g 1     buPROPion (WELLBUTRIN XL) 150 MG 24 hr tablet TAKE ONE TABLET BY MOUTH EVERY MORNING 90 tablet 0     levonorgestrel (MIRENA) 20 MCG/24HR IUD 1 each (20 mcg) by Intrauterine route once       naltrexone (DEPADE/REVIA) 50 MG tablet Take 1/2 tablet with evening meal 45 tablet 3     phentermine (ADIPEX-P) 37.5 MG tablet Take 0.5-1 tablets (18.75-37.5 mg) by mouth every morning (before breakfast) for 90 days 90 tablet 1     Semaglutide-Weight Management (WEGOVY) 0.25 MG/0.5ML SOAJ Inject 0.25 mg Subcutaneous once a week for 28 days 2 mL 0       ALLERGIES:   No Known Allergies    PHYSICAL EXAM:  BP (!) 138/90   Ht 1.765 m (5' 9.5\")   Wt 111.4 kg (245 lb 8 oz)   BMI 35.73 kg/m    Neck 14.5\", Mallampati 2+  Heart regular  Lungs clear  Abdominal circumference 44\"  Extremities " trace bilateral edema  Euthymic  Alert and oriented      Arpita Ford MD        FOLLOW-UP:   As above    TIME: 45 min spent on evaluation, management, counseling, education, & motivational interviewing     Sincerely,    Arpita Ford MD

## 2023-01-26 NOTE — PATIENT INSTRUCTIONS
[facility]    Remember to: check out the 24 week program [email or web address]     -Eat 3 meals a day (not 2, not 5) Chew your food well/SLOW down  -Eat your protein first  -Be a water drinker/Minize liquid calories (no regular pop, no juice) skim or 1% milk OK  -Sleep 7-8 hours each night. Address sleep if problematic  -Stress management is important. Address if problematic  -Move-8000 steps daily Muscle: maintain your muscle mass (strength training 2X/wk)  -Wheat, not white (bread, pasta, crackers, frankie, bagels, tortillas, rice)  -Limit restaurant, cafeteria, take out, drive through to 2 times per week or less  -Minimize caffeine, alcohol, and night-time snacking  -Consider keeping a food diary (i.e. My Fitness Pal, Lose It, or other food tracker)  -Follow up with the dietitian      **Some lean proteins: chicken, turkey, tuna, salmon, crab, fish, shrimp, scallops, lobster, lean cuts of beef and pork, luncheon meats, veggie burgers, beans (black, lima, garbanzo, rapp, kidney, refried), chile, cottage cheese, string cheese, other cheese, eggs, tofu, peanut butter, nuts, vegan crumbles, greek yogurt       MEDICATIONS FOR WEIGHT LOSS    PHENTERMINE (Adipex): approved in 1959 for appetite suppression.  It has stimulant effects and cannot be used with Ritalin, Concerta, or other stimulants.  It is not addictive although it's chemically related to amphetamines.  Amphetamines are addictive. The most common side effects are dry mouth, increased energy and concentration, increased pulse, and constipation.  You should not take phentermine if you have glaucoma, hyperthyroidism, or uncontrolled/untreated hypertension.  $24-$30 for 90 tablets    PHENDIMETRAZINE (Bontril): Appetite suppressant/sympathomimetic.  Controlled substance.  Side effects and contraindications similar to phentermine.  $45-$60 for 3 month supply    TOPIRAMATE (Topamax): Anti-seizure medication, also used  to prevent migraines.  Side effects include paresthesia, glaucoma, altered concentration, attention difficulties, memory and speech problems, metabolic acidosis, depression, increase in body temperature and decrease sweating, kidney stones, and weight loss.  Do not take Topamax while taking Depakote as this can cause high ammonia levels.  You must have reliable birth control as Topamax can cause birth defects.  Discontinue slowly to avoid seizure.  Insurance usually covers Topiramate.    QSYMIA (Phentermine + Topamax):  See above information about phentermine and Topamax.  Most common side effects are paresthesia, dizziness, distortion of taste, insomnia, constipation, and dry mouth.  $150-$220 per month    DIETHYLPROPION (Tenuate): Sympathomimetic amine.  Appetite suppressant.  Doses 25 mg before meals or 75 mg per day.  Most common side effects are hypertension, palpitations, EKG changes, and increased seizures in epileptics.  There can be a possible adverse reaction with alcohol.  $70-$90 per 3 months    XENICAL(Orlistat) (Mp OTC): Approved in 1999.  A fat-blocker.  It reduces absorption of fat by approximately 30%.  It has beneficial effects on lipid levels.  Side effects include diarrhea, abdominal cramping, fecal incontinence, oily spotting, and flatus with discharge.  Side effects are minimized if the patient limits their dietary fat to no more than 30% of their diet.  Patients must take a multivitamin daily to avoid vitamin D, E, A, and K deficiency.  $120 per month    CONTRAVE (Naltrexone/Bupropion): Approved in 2014.  It is a combination pill including an opioid receptor blocker and a long-standing antidepressant.  Most common side effects include nausea, constipation, headache, vomiting, dizziness, trouble sleeping, dry mouth, and diarrhea.  With all antidepressants watch for mood changes and suicide ideation.  Bupropion has been known to lower the seizure threshold in those prone to seizures.  It  should not be used in a patient with a recent history of bulimia. It has been associated with liver damage from taking higher than recommended doses.  Do not use countrave if you have taken opioid medications or opioid street drugs in the past 7-10 days, if you are currently on opioids, methadone, or if you are pregnant.  Do not use contrave if you have recently stopped using alcohol or benzodiazepines.  Taper off contrave slowly.  Dosing: titrate up to 2 tablets twice daily of the Naltrexone 8 mg/ Bupropion 90 mg tablets.  $200 for 90 tablets    SAXENDA (Liraglutide): A daily injectable (3mg daily) medication used for type 2 diabetes (Victoza). Glucagon-like peptide-1 (GLP-1) agonist. Contraindications include personal or family history of medullary thyroid cancer or MEN type 2. Acute pancreatitis has been observed in patients taking liraglutide. Liraglutide causes C-cell tumors in rats and mice. It is unknown whether liraglutide causes tumors in humans. Start at 0.6mg, increasing the dose weekly up to 3mg.     TRULICITY (Dulaglutide): A weekly injectable (4.5mg weekly) medication used for type 2 diabetes. Glucagon-like peptide-1(GLP-1) agonist. Contraindications include personal or family history of medullary thyroid cancer or MEN type 2. Acute pancreatitis has been observed in patients taking liraglutide. Dulaglutide causes C-cell tumors in rats and mice. It is unknown whether liraglutide causes tumors in humans. Start at 0.75 mg, 1.5 mg, 3.0 mg, to 4.5 mg increasing the dose monthly.      VYVANSE (Lisdexamfetamine dimesylate): a CNS stimulant used to treat ADHD. Indicated for the treatment of moderate to severe Binge Eating Disorder in Adults. Contraindicated in patients with known heart disease, structural abnormalities of the heart, serious heart arrhythmias or unexplained syncope. CNS stimulants such as vyvanse may cause manic or psychotic symptoms in patients with BPAD or pre-existing psychosis. Use with  caution in patients with Raynaud's phenomenon. Most common side effects include dry mouth, insomnia, decreased appetite, increased heart rate, jittery feeling, constipation and anxiety.     WEGOVY (Semaglutide): A weekly injectable (2.4mg weekly) medication used for type 2 diabetes (Ozempic). Glucagon-like peptide-1 (GLP-1) agonist. Contraindications include personal or family history of medullary thyroid cancer or MEN type 2. Acute pancreatitis has been observed in patients taking Semaglutide. Semaglutide causes C-cell tumors in rats and mice. It is unknown whether Semaglutide causes tumors in humans. Start at 0.25mg, increasing to 0.5, 1.0, 1.7 then 2.4 monthly.     MOUNJARO (Tirzepatide): A weekly injectable medication indicated for type 2 diabetes. Glucagon-like-peptide-1 (GLP-1) agonist and Glucose-Dependent Insulinotropic Polypeptide (GIP) agonist. Contraindications include personal or family history of medullary thyroid cancer or MEN type 2. Acute pancreatitis has been observed in patients taking Tirzepatide. Tirzepatide causes C-cell tumors in rats and mice. It is unknown whether Tirzepatide causes tumors in humans. Watch for neck mass, difficulty swallowing, persistent hoarseness, and epigastric abdominal pain. Most common side effects include nausea, diarrhea, vomiting, constipation, dyspepsia, and abdominal pain. Start at 2.5mg, increasing to 5.0, 7.5, 10, 12.5 then 15mg monthly.          [email or web address]/wegovy/savings-card.[email or web address]

## 2023-01-26 NOTE — LETTER
"    2023         RE: Cristina Eaton  [address] [place] [address]        Dear Colleague,    Thank you for referring your patient, Cristina Eaton, to the [facility] [place]. Please see a copy of my visit note below.        New Medical Weight Management Consult    PATIENT:  Cristina Eaton  MRN:         [id number]  :         1975  KELLY:         2023    Dear Concepción Peace, APRN, CNP,    I had the pleasure of seeing your patient, Cristina Eaton. Full intake/assessment was done to determine barriers to weight loss success and develop a treatment plan. Cristina Eaton is a 47 year old female interested in treatment of medical problems associated with excess weight. She has a height of 5' 9.5\", a weight of 245 lbs 8 oz, and the calculated Body mass index is 35.73 kg/m .    ASSESSMENT/PLAN:  Sedentary lifestyle  Optional alcohol calories ramped up during COVID and continue  Lipids, glucose and BP OK    Phentermine 1/2-1 tab in the am  Naltrexone 25mg with dinner  Goals: eat protein first and cut alcoholic beverages in 1/2 to <10/wk  Labs  Dietitian  Consider 24 week program  She will check in to Wegovy coverage.  did not come up with information    She has the following co-morbidities:       2023   I have the following health issues associated with obesity: None of the above   I have the following symptoms associated with obesity: Knee Pain, Depression, Back Pain, Hip Pain       Patient Goals 2023   I am interested in having a healthier weight to diminish current health problems: Yes   I am interested in having a healthier weight in order to prevent future health problems: Yes   I am interested in having a healthier weight in order to have a future surgery: No       Referring Provider 2023   Please name the provider who referred you to Medical Weight Management.  If you do not know, please answer: \"I Don't Know\". Renée " Concepción (friend)       Weight History 1/23/2023   How concerned are you about your weight? Very Concerned   Would you describe your weight gain as gradual? Yes   I became overweight: As a Child   The following factors have contributed to my weight gain:  Mental Health Issues, Eating Wrong Types of Food, Eating Too Much, Lack of Exercise, Stress   I have tried the following methods to lose weight: Watching Portions or Calories, Weight Watchers, Atkins-type Diet (Low Carb/High Protein), Slim Fast or Other Liquid Diets, Meal Replacements   My lowest weight since age 19 was: 170   My highest weight since age 19 was: 252   The most weight I have ever lost was: (lbs) 40   I have the following family history of obesity/being overweight:  I am the only one in my immediate family who is overweight   Has anyone in your family had weight loss surgery? No   How has your weight changed over the last year?  Gained   How many pounds? 25       Diet Recall Review with Patient 1/23/2023   Do you typically eat breakfast? No   Do you typically eat lunch? Yes   If you do eat lunch, what types of food do you typically eat?  chips crackers cheese, or lean cuisine   Do you typically eat supper? Yes   If you do eat supper, what types of food do you typically eat? quick and easy, frozen pizza, or meat and bread   Do you typically eat snacks? Yes   If you do snack, what types of food do you typically eat? chips crackers cheese   Do you like vegetables?  No   Do you drink water? Yes   How many glasses of juice do you drink in a typical day? 0   How many of glasses of milk do you drink in a typical day? 0   How many 8oz glasses of sugar containing drinks such as Chris-Aid/sweet tea do you drink in a day? 0   How many cans/bottles of sugar pop/soda/tea/sports drinks do you drink in a day? 0   How many cans/bottles of diet pop/soda/tea or sports drink do you drink in a day? 3   How often do you have a drink of alcohol? 2-3 TImes a Week   If you do  drink, how many drinks might you have in a day? 3-4       Eating Habits 1/23/2023   Generally, my meals include foods like these: bread, pasta, rice, potatoes, corn, crackers, sweet dessert, pop, or juice. A Few Times a Week   Generally, my meals include foods like these: fried meats, brats, burgers, french fries, pizza, cheese, chips, or ice cream. Almost Everyday   Eat fast food (like Fitwall, Dynex, Taco Bell). Once a Week   Eat at a buffet or sit-down restaurant. Never   Eat most of my meals in front of the TV or computer. Everyday   Often skip meals, eat at random times, have no regular eating times. Never   Rarely sit down for a meal but snack or graze throughout.  Once a Week   Eat extra snacks between meals. Everyday   Eat most of my food at the end of the day. A Few Times a Week   Eat in the middle of the night or wake up at night to eat. Never   Eat extra snacks to prevent or correct low blood sugar. Never   Eat to prevent acid reflux or stomach pain. Never   Worry about not having enough food to eat. Never   Have you been to the food shelf at least a few times this year? No   I eat when I am depressed. Once a Week   I eat when I am stressed. A Few Times a Week   I eat when I am bored. Almost Everyday   I eat when I am anxious. Almost Everyday   I eat when I am happy or as a reward. Once a Week   I feel hungry all the time even if I just have eaten. Once a Week   Feeling full is important to me. Almost Everyday   I finish all the food on my plate even if I am already full. A Few Times a Week   I can't resist eating delicious food or walk past the good food/smell. Everyday   I eat/snack without noticing that I am eating. Everyday   I eat when I am preparing the meal. Less Than Weekly   I eat more than usual when I see others eating. A Few Times a Week   I have trouble not eating sweets, ice cream, cookies, or chips if they are around the house. Less Than Weekly   I think about food all day. Almost  Everyday   What foods, if any, do you crave? Chips/Crackers   Please list any other foods you crave? cheese       Amount of Food 1/23/2023   I make myself vomit what I have eaten or use laxatives to get rid of food. Never   I eat a large amount of food, like a loaf of bread, a box of cookies, a pint/quart of ice cream, all at once. Monthly   I eat a large amount of food even when I am not hungry. Weekly   I eat rapidly. Weekly   I eat alone because I feel embarrassed and do not want others to see how much I have eaten. Weekly   I eat until I am uncomfortably full. Monthly   I feel bad, disgusted, or guilty after I overeat. Almost Everyday   I make myself vomit what I have eaten or use laxatives to get rid of food. Never       Activity/Exercise History 1/23/2023   How much of a typical 12 hour day do you spend sitting? Most of the Day   How much of a typical 12 hour day do you spend lying down? Less Than Half the Day   How much of a typical day do you spend walking/standing? Less Than Half the Day   How many hours (not including work) do you spend on the TV/Video Games/Computer/Tablet/Phone? 4-5 Hours   How many times a week are you active for the purpose of exercise? Once a Week   What keeps you from being more active? Pain, Shortness of Breath, Worried People Will Look At Me   How many total minutes do you spend doing some activity for the purpose of exercising when you exercise? 15-30 Minutes       PAST MEDICAL HISTORY:  Past Medical History:   Diagnosis Date     Anxiety 2009     Asthma      Gastroesophageal reflux disease without esophagitis      Knee pain      Menorrhagia with regular cycle 09/29/2021     Obesity        Work/Social History Reviewed With Patient 1/23/2023   My employment status is: Full-Time   My job is:  for [employer]   How much of your job is spent on the computer or phone? 100%   How many hours do you spend commuting to work daily?  .3   What is your marital status?  /In a Relationship   If in a relationship, is your significant other overweight? No   Do you have children? Yes   If you have children, are they overweight? No   Who do you live with?   and son   Are they supportive of your health goals? Yes   Who does the food shopping?  me       Mental Health History Reviewed With Patient 1/23/2023   Have you ever been physically or sexually abused? No   If yes, do you feel that the abuse is affecting your weight? N/A   If yes, would you like to talk to a counselor about the abuse? N/A   How often in the past 2 weeks have you felt little interest or pleasure in doing things? Not at all   Over the past 2 weeks how often have you felt down, depressed, or hopeless? Not at all       Sleep History Reviewed With Patient 1/23/2023   How many hours do you sleep at night? 6   Do you think that you snore loudly or has anybody ever heard you snore loudly (louder than talking or so loud it can be heard behind a shut door)? Yes   Has anyone seen or heard you stop breathing during your sleep? No   Do you often feel tired, fatigued, or sleepy during the day? No   Do you have a TV/Computer in your bedroom? Yes       MEDICATIONS:   Current Outpatient Medications   Medication Sig Dispense Refill     albuterol (PROAIR HFA/PROVENTIL HFA/VENTOLIN HFA) 108 (90 Base) MCG/ACT inhaler Inhale 2 puffs into the lungs every 6 hours as needed for shortness of breath / dyspnea or wheezing 18 g 1     buPROPion (WELLBUTRIN XL) 150 MG 24 hr tablet TAKE ONE TABLET BY MOUTH EVERY MORNING 90 tablet 0     levonorgestrel (MIRENA) 20 MCG/24HR IUD 1 each (20 mcg) by Intrauterine route once       naltrexone (DEPADE/REVIA) 50 MG tablet Take 1/2 tablet with evening meal 45 tablet 3     phentermine (ADIPEX-P) 37.5 MG tablet Take 0.5-1 tablets (18.75-37.5 mg) by mouth every morning (before breakfast) for 90 days 90 tablet 1     Semaglutide-Weight Management (WEGOVY) 0.25 MG/0.5ML SOAJ Inject 0.25 mg Subcutaneous  "once a week for 28 days 2 mL 0       ALLERGIES:   No Known Allergies    PHYSICAL EXAM:  BP (!) 138/90   Ht 1.765 m (5' 9.5\")   Wt 111.4 kg (245 lb 8 oz)   BMI 35.73 kg/m    Neck 14.5\", Mallampati 2+  Heart regular  Lungs clear  Abdominal circumference 44\"  Extremities trace bilateral edema  Euthymic  Alert and oriented      Arpita Ford MD        FOLLOW-UP:   As above    TIME: 45 min spent on evaluation, management, counseling, education, & motivational interviewing     Sincerely,    Arpita Ford MD          Again, thank you for allowing me to participate in the care of your patient.        Sincerely,        Arpita Ford MD    "

## 2023-02-01 ENCOUNTER — VIRTUAL VISIT (OUTPATIENT)
Dept: SURGERY | Facility: CLINIC | Age: 48
End: 2023-02-01
Payer: COMMERCIAL

## 2023-02-01 DIAGNOSIS — E66.9 OBESITY (BMI 30-39.9): Primary | ICD-10-CM

## 2023-02-01 PROCEDURE — 97802 MEDICAL NUTRITION INDIV IN: CPT | Mod: GT

## 2023-02-01 NOTE — LETTER
2/1/2023         RE: Cristina Eaton  [address]  [place] [address]        Dear Colleague,    Thank you for referring your patient, Cristina Eaton, to the [facility] [place]. Please see a copy of my visit note below.    Cristina Eaton is a 47 year old who is being evaluated via a billable video visit.        How would you like to obtain your AVS? MyChart  If the video visit is dropped, the invitation should be resent by: Text to cell phone: [phone]  Will anyone else be joining your video visit? No          Medical Weight Loss Initial Diet Evaluation  Assessment:  Cristina is presenting today for a new weight management nutrition consultation. Pt has had an initial appointment with Dr. Ford.  Weight loss medication: Phentermine, Naltrexone, Wellbutrin     Personal Goals: Overall weight loss and health focused.      Anthropometrics:    Pt's weight is 241.0 lbs  Initial weight: 245.8 lbs  Weight change: down 4.8 lbs  BMI: 35.08  Ideal body weight: 67.4 kg (148 lb 7.7 oz)  Adjusted ideal body weight: 85 kg (187 lb 4.6 oz)  Estimated RMR (Gulf-St Jeor equation):  1,800 kcals x 1.2 (sedentary) = 2,170 kcals (for weight maintenance)      Recommended Protein Intake: 65-85 grams of protein/day    Medical History:  Patient Active Problem List   Diagnosis     Anxiety Disorder NOS     Irritable bowel syndrome     Obesity     Menorrhagia with regular cycle     IUD (intrauterine device) in place     Asthma     Gastroesophageal reflux disease without esophagitis     Morbid obesity (H)     Knee pain      Diabetes: No  HbA1c:  No results found for: HGBA1C    Nutrition History:   Food allergies/intolerances/cultural or religous food customs: No   Weight loss history: Atkins before and was successful but after stopping she had weight gain. Was not sustainable. Signed up for weight watch[employer] Media dorita to track foods.   Vitamins/Mineral Supplementation: Started Mg, Ca, vit D    Dietary  Recall:  Breakfast: Low calorie english muffin with an egg  Lunch: apple with Turkey slices and rice cake  Dinner: Grilled cheese on wheat  Typical Snacks: Whatever is brought to work, has weakness with chips and crackers  Overnight eating: No  Eating out: 1x per week    Beverages: Water, black coffee in am, x2 diet cokes daily    Exercise: Starting walking on treadmill    Nutrition Diagnosis (PES statement):   Overweight/Obesity (NC 3.3) related to overeating and poor lifestyle habits as evidenced by patient report of poor nutritional intake in the evening time, craving high carb food item and BMI 35.08      Nutrition Intervention  1. Food and/or Nutrient Delivery   a. Placed emphasis on importance of developing a healthy meal routine, aiming for 3 meals a day and no snacks.  2. Nutrition Education   a. Discussed with patient how to build a meal: the importance of including a lean/low fat protein at each meal, include a source of vegetables at a minimum of lunch and dinner and limiting carbohydrate  b. Educated on sources of lean protein, portion sizes, the amount of grams found in each source. Recommend patient to aim for 20-30g protein at each meal.    3. Nutrition Counseling   a. Encouraged importance of developing routine exercise for health benefits and weight loss.      Goals established by patient:   1. Go to e[employer] 3x per week for at least 30 min  2. Focus on eating protein first/ aiming for 20g at each meal  3. Continue tracking food in the [employer] dorita.     Handouts provided:  None    Assessment/Plan:    Pt will follow up in 2 month(s) with bariatrician and 2 month(s) with dietitian.       Video-Visit Details    Type of service:  Video Visit    Video Start Time (time video started): 3:30 pm    Video End Time (time video stopped): 3:49 pm    Originating Location (pt. Location): Home        Distant Location (provider location):  On-site    Mode of Communication:  Video Conference via  [facility]    Physician has received verbal consent for a Video Visit from the patient? Yes      Nury Singh RD          Again, thank you for allowing me to participate in the care of your patient.        Sincerely,        Nury Singh RD

## 2023-02-01 NOTE — PROGRESS NOTES
Cristina Eaton is a 47 year old who is being evaluated via a billable video visit.        How would you like to obtain your AVS? MyChart  If the video visit is dropped, the invitation should be resent by: Text to cell phone: [phone]  Will anyone else be joining your video visit? No          Medical Weight Loss Initial Diet Evaluation  Assessment:  Cristina is presenting today for a new weight management nutrition consultation. Pt has had an initial appointment with Dr. Ford.  Weight loss medication: Phentermine, Naltrexone, Wellbutrin     Personal Goals: Overall weight loss and health focused.      Anthropometrics:    Pt's weight is 241.0 lbs  Initial weight: 245.8 lbs  Weight change: down 4.8 lbs  BMI: 35.08  Ideal body weight: 67.4 kg (148 lb 7.7 oz)  Adjusted ideal body weight: 85 kg (187 lb 4.6 oz)  Estimated RMR ([place]-St Jeor equation):  1,800 kcals x 1.2 (sedentary) = 2,170 kcals (for weight maintenance)      Recommended Protein Intake: 65-85 grams of protein/day    Medical History:  Patient Active Problem List   Diagnosis     Anxiety Disorder NOS     Irritable bowel syndrome     Obesity     Menorrhagia with regular cycle     IUD (intrauterine device) in place     Asthma     Gastroesophageal reflux disease without esophagitis     Morbid obesity (H)     Knee pain      Diabetes: No  HbA1c:  No results found for: HGBA1C    Nutrition History:   Food allergies/intolerances/cultural or religous food customs: No   Weight loss history: Atkins before and was successful but after stopping she had weight gain. Was not sustainable. Signed up for weight watchers dorita to track foods.   Vitamins/Mineral Supplementation: Started Mg, Ca, vit D    Dietary Recall:  Breakfast: Low calorie english muffin with an egg  Lunch: apple with Turkey slices and rice cake  Dinner: Grilled cheese on wheat  Typical Snacks: Whatever is brought to work, has weakness with chips and crackers  Overnight eating: No  Eating out: 1x per  week    Beverages: Water, black coffee in am, x2 diet cokes daily    Exercise: Starting walking on treadmill    Nutrition Diagnosis (PES statement):   Overweight/Obesity (NC 3.3) related to overeating and poor lifestyle habits as evidenced by patient report of poor nutritional intake in the evening time, craving high carb food item and BMI 35.08      Nutrition Intervention  1. Food and/or Nutrient Delivery   a. Placed emphasis on importance of developing a healthy meal routine, aiming for 3 meals a day and no snacks.  2. Nutrition Education   a. Discussed with patient how to build a meal: the importance of including a lean/low fat protein at each meal, include a source of vegetables at a minimum of lunch and dinner and limiting carbohydrate  b. Educated on sources of lean protein, portion sizes, the amount of grams found in each source. Recommend patient to aim for 20-30g protein at each meal.    3. Nutrition Counseling   a. Encouraged importance of developing routine exercise for health benefits and weight loss.      Goals established by patient:   1. Go to [employer] 3x per week for at least 30 min  2. Focus on eating protein first/ aiming for 20g at each meal  3. Continue tracking food in the [employer] dorita.     Handouts provided:  None    Assessment/Plan:    Pt will follow up in 2 month(s) with bariatrician and 2 month(s) with dietitian.       Video-Visit Details    Type of service:  Video Visit    Video Start Time (time video started): 3:30 pm    Video End Time (time video stopped): 3:49 pm    Originating Location (pt. Location): Home        Distant Location (provider location):  On-site    Mode of Communication:  Video Conference via [facility]    Physician has received verbal consent for a Video Visit from the patient? Yes      Nury Singh RD

## 2023-03-29 ENCOUNTER — VIRTUAL VISIT (OUTPATIENT)
Dept: SURGERY | Facility: CLINIC | Age: 48
End: 2023-03-29
Payer: COMMERCIAL

## 2023-03-29 VITALS — BODY MASS INDEX: 33.04 KG/M2 | WEIGHT: 230.8 LBS | HEIGHT: 70 IN

## 2023-03-29 DIAGNOSIS — E66.811 CLASS 1 OBESITY WITH BODY MASS INDEX (BMI) OF 33.0 TO 33.9 IN ADULT, UNSPECIFIED OBESITY TYPE, UNSPECIFIED WHETHER SERIOUS COMORBIDITY PRESENT: Primary | ICD-10-CM

## 2023-03-29 PROCEDURE — 99213 OFFICE O/P EST LOW 20 MIN: CPT | Mod: VID | Performed by: FAMILY MEDICINE

## 2023-03-29 NOTE — PROGRESS NOTES
Cristina Eaton is 48 year old  female who presents for a billable video visit today.    How would you like to obtain your AVS? MyChart  If dropped from the video visit, the video invitation should be resent by: Text to cell phone: [phone]  Will anyone else be joining your video visit? No      Video Start Time: 10:15    Are there any specific questions or needs that you would like addressed at your visit today? Fell off track a little bit on vacation but is doing better now, She is tolerating a full tab of phentermine and a quarter tab of naltrexone    Provider Notes:   Bariatric Follow-up    48 year old  female BMI:Body mass index is 33.59 kg/m .    Weight:   Wt Readings from Last 1 Encounters:   03/29/23 104.7 kg (230 lb 12.8 oz)    pounds    Comorbidities:  Patient Active Problem List   Diagnosis     Anxiety Disorder NOS     Irritable bowel syndrome     Obesity     Menorrhagia with regular cycle     IUD (intrauterine device) in place     Asthma     Gastroesophageal reflux disease without esophagitis     Morbid obesity (H)     Knee pain         Interim: 15# down. Taking 1 phentermine in the am and 1/4 naltrexone.     Plan:  DIET  Add fruit   EXERCISE [employer]   PHARMACOTHERAPY continue phentermine and 1/4 naltrexone    congratulated on healthy lifestyle changes. Discussed plateaus and encouraged to stay the course. F/u dietitian then with me     -We reviewed her medications and whether associated with weight gain.    Current Outpatient Medications:      albuterol (PROAIR HFA/PROVENTIL HFA/VENTOLIN HFA) 108 (90 Base) MCG/ACT inhaler, Inhale 2 puffs into the lungs every 6 hours as needed for shortness of breath / dyspnea or wheezing, Disp: 18 g, Rfl: 1     buPROPion (WELLBUTRIN XL) 150 MG 24 hr tablet, TAKE ONE TABLET BY MOUTH EVERY MORNING, Disp: 90 tablet, Rfl: 0     levonorgestrel (MIRENA) 20 MCG/24HR IUD, 1 each (20 mcg) by Intrauterine route once, Disp: , Rfl:      naltrexone (DEPADE/REVIA) 50  "MG tablet, Take 1/2 tablet with evening meal, Disp: 45 tablet, Rfl: 3     phentermine (ADIPEX-P) 37.5 MG tablet, Take 0.5-1 tablets (18.75-37.5 mg) by mouth every morning (before breakfast) for 90 days, Disp: 90 tablet, Rfl: 1      We discussed [facility] Bariatric Basics including:  -eating 3 meals daily  -eating protein first  -eating slowly, chewing food well  -avoiding/limiting calorie containing beverages  -choosing wheat, not white with breads, crackers, pastas, frankie, bagels, tortillas, rice  -limiting restaurant or cafeteria eating to twice a week or less  -We discussed the importance of restorative sleep and stress management in maintaining a healthy weight.  -We discussed insulin resistance and glycemic index as it relates to appetite and weight control  -We discussed the National Weight Control Registry healthy weight maintenance strategies and ways to optimize metabolism.  -We discussed the importance of physical activity including cardiovascular and strength training in maintaining a healthier weight and explored viable options.    Most recent labs:  Lab Results   Component Value Date    WBC 6.8 01/02/2019    HGB 13.6 01/02/2019    HCT 41.6 01/02/2019    MCV 90 01/02/2019     01/02/2019     Lab Results   Component Value Date    CHOL 158 07/08/2021     Lab Results   Component Value Date    HDL 44 (L) 07/08/2021       Lab Results   Component Value Date    TRIG 174 (H) 07/08/2021     Lab Results   Component Value Date    ALT 37 (H) 01/26/2023    AST 33 01/26/2023    ALKPHOS 83 01/26/2023       Lab Results   Component Value Date    TSH 1.26 01/26/2023         DIETARY HISTORY  Meals Per Day: 3  Eating Protein First?: yes  Food Diary: B:protein shake L:healthy choice D:[employer] stuffed chicken WW  Points. No fruit. Veggies with dinner  Snacks Per Day: rare  Typical Snack: carrots and rice cakes  Fluid Intake: intentional but \"not as good as I should be\"  Portion Control: improved  Calorie Containing " "Beverages: no  Alcohol per week: down to 1 night a week 3-4  Typical Protein Food Choices: variety  Choosing Whole Grains: yes  Grocery Shopping is done by: herself  Food Preparation is done by: herself  Meals at Restaurant/Cafeteria/Take Out Per Week: none  Eating at the Table:   TV is Off During Meals:     Positive Changes Since Last Visit: less snacking, more protein  Struggling With:     Knowledgeable in Reading Food Labels: yes  Getting Adequate Protein: yes  Sleeping 7-8 hours/day 7-8   Stress management low    PHYSICAL ACTIVITY PATTERNS:  Cardiovascular: going to [employer]  Strength Training: circuit    REVIEW OF SYSTEMS  GENERAL/CONSTITUTIONAL:  Fatigue: OK  CARDIOVASCULAR:  Chest Pain with Exertion: no  PULMONARY:  Dyspnea on exertion: no    PHYSICAL EXAM: (most recent vitals and today's stated weight)  Vitals: Ht 1.765 m (5' 9.5\")   Wt 104.7 kg (230 lb 12.8 oz)   BMI 33.59 kg/m        GEN: Pleasant and in no acute distress  PSYCH: A&OX3,     I have reviewed the note as documented above.  This accurately captures the substance of my conversation with the patient.  Thank you for the opportunity to participate in the care of your patient.    Arpita Ford MD, FAAFP  [facility]  Diplomate, American Board of Obesity Medicine    Total time spent on the date of this encounter doing: chart review, review of test results, patient visit, physical exam, education, counseling, developing plan of care, and documenting = 20 minutes.          Video-Visit Details    Type of service:  Video Visit    Platform used for Video Visit: [employer]    Video End Time (time video stopped): 10:35    Originating Location (pt. Location): Home        Distant Location (provider location):  On-site    Distant Location (provider location):  [facility] SURGERY [facility] AND BARIATRICS CARE [place]     "

## 2023-03-29 NOTE — LETTER
3/29/2023         RE: Cirstina Eaton  [address]  [place] [address]        Dear Colleague,    Thank you for referring your patient, Cristina Eaton, to the [facility] AND BARIATRICS CARE [place]. Please see a copy of my visit note below.    Cristina Eaton is 48 year old  female who presents for a billable video visit today.    How would you like to obtain your AVS? MyChart  If dropped from the video visit, the video invitation should be resent by: Text to cell phone: [phone]  Will anyone else be joining your video visit? No      Video Start Time: 10:15    Are there any specific questions or needs that you would like addressed at your visit today? Fell off track a little bit on vacation but is doing better now, She is tolerating a full tab of phentermine and a quarter tab of naltrexone    Provider Notes:   Bariatric Follow-up    48 year old  female BMI:Body mass index is 33.59 kg/m .    Weight:   Wt Readings from Last 1 Encounters:   03/29/23 104.7 kg (230 lb 12.8 oz)    pounds    Comorbidities:  Patient Active Problem List   Diagnosis     Anxiety Disorder NOS     Irritable bowel syndrome     Obesity     Menorrhagia with regular cycle     IUD (intrauterine device) in place     Asthma     Gastroesophageal reflux disease without esophagitis     Morbid obesity (H)     Knee pain         Interim: 15# down. Taking 1 phentermine in the am and 1/4 naltrexone.     Plan:  DIET  Add fruit   EXERCISE [employer]   PHARMACOTHERAPY continue phentermine and 1/4 naltrexone    congratulated on healthy lifestyle changes. Discussed plateaus and encouraged to stay the course. F/u dietitian then with me     -We reviewed her medications and whether associated with weight gain.    Current Outpatient Medications:      albuterol (PROAIR HFA/PROVENTIL HFA/VENTOLIN HFA) 108 (90 Base) MCG/ACT inhaler, Inhale 2 puffs into the lungs every 6 hours as needed for shortness of breath / dyspnea or  wheezing, Disp: 18 g, Rfl: 1     buPROPion (WELLBUTRIN XL) 150 MG 24 hr tablet, TAKE ONE TABLET BY MOUTH EVERY MORNING, Disp: 90 tablet, Rfl: 0     levonorgestrel (MIRENA) 20 MCG/24HR IUD, 1 each (20 mcg) by Intrauterine route once, Disp: , Rfl:      naltrexone (DEPADE/REVIA) 50 MG tablet, Take 1/2 tablet with evening meal, Disp: 45 tablet, Rfl: 3     phentermine (ADIPEX-P) 37.5 MG tablet, Take 0.5-1 tablets (18.75-37.5 mg) by mouth every morning (before breakfast) for 90 days, Disp: 90 tablet, Rfl: 1      We discussed [facility] Bariatric Basics including:  -eating 3 meals daily  -eating protein first  -eating slowly, chewing food well  -avoiding/limiting calorie containing beverages  -choosing wheat, not white with breads, crackers, pastas, frankie, bagels, tortillas, rice  -limiting restaurant or cafeteria eating to twice a week or less  -We discussed the importance of restorative sleep and stress management in maintaining a healthy weight.  -We discussed insulin resistance and glycemic index as it relates to appetite and weight control  -We discussed the National Weight Control Registry healthy weight maintenance strategies and ways to optimize metabolism.  -We discussed the importance of physical activity including cardiovascular and strength training in maintaining a healthier weight and explored viable options.    Most recent labs:  Lab Results   Component Value Date    WBC 6.8 01/02/2019    HGB 13.6 01/02/2019    HCT 41.6 01/02/2019    MCV 90 01/02/2019     01/02/2019     Lab Results   Component Value Date    CHOL 158 07/08/2021     Lab Results   Component Value Date    HDL 44 (L) 07/08/2021       Lab Results   Component Value Date    TRIG 174 (H) 07/08/2021     Lab Results   Component Value Date    ALT 37 (H) 01/26/2023    AST 33 01/26/2023    ALKPHOS 83 01/26/2023       Lab Results   Component Value Date    TSH 1.26 01/26/2023         DIETARY HISTORY  Meals Per Day: 3  Eating Protein First?: yes  Food  "Diary: B:protein shake L:healthy choice D:[employer]i stuffed chicken WW  Points. No fruit. Veggies with dinner  Snacks Per Day: rare  Typical Snack: carrots and rice cakes  Fluid Intake: intentional but \"not as good as I should be\"  Portion Control: improved  Calorie Containing Beverages: no  Alcohol per week: down to 1 night a week 3-4  Typical Protein Food Choices: variety  Choosing Whole Grains: yes  Grocery Shopping is done by: herself  Food Preparation is done by: herself  Meals at Restaurant/Cafeteria/Take Out Per Week: none  Eating at the Table:   TV is Off During Meals:     Positive Changes Since Last Visit: less snacking, more protein  Struggling With:     Knowledgeable in Reading Food Labels: yes  Getting Adequate Protein: yes  Sleeping 7-8 hours/day 7-8   Stress management low    PHYSICAL ACTIVITY PATTERNS:  Cardiovascular: going to [employer]  Strength Training: circuit    REVIEW OF SYSTEMS  GENERAL/CONSTITUTIONAL:  Fatigue: OK  CARDIOVASCULAR:  Chest Pain with Exertion: no  PULMONARY:  Dyspnea on exertion: no    PHYSICAL EXAM: (most recent vitals and today's stated weight)  Vitals: Ht 1.765 m (5' 9.5\")   Wt 104.7 kg (230 lb 12.8 oz)   BMI 33.59 kg/m        GEN: Pleasant and in no acute distress  PSYCH: A&OX3,     I have reviewed the note as documented above.  This accurately captures the substance of my conversation with the patient.  Thank you for the opportunity to participate in the care of your patient.    Arpita Ford MD, FAAFP  [facility]  Diplomate, American Board of Obesity Medicine    Total time spent on the date of this encounter doing: chart review, review of test results, patient visit, physical exam, education, counseling, developing plan of care, and documenting = 20 minutes.          Video-Visit Details    Type of service:  Video Visit    Platform used for Video Visit: [place]    Video End Time (time video stopped): 10:35    Originating Location (pt. Location): " Home        Distant Location (provider location):  On-site    Distant Location (provider location):  [facility] SURGERY [facility] AND BARIATRICS CARE [place]         Again, thank you for allowing me to participate in the care of your patient.        Sincerely,        Arpita Ford MD

## 2023-04-06 ENCOUNTER — VIRTUAL VISIT (OUTPATIENT)
Dept: SURGERY | Facility: CLINIC | Age: 48
End: 2023-04-06
Payer: COMMERCIAL

## 2023-04-06 DIAGNOSIS — E66.9 OBESITY (BMI 30-39.9): Primary | ICD-10-CM

## 2023-04-06 DIAGNOSIS — Z71.3 NUTRITIONAL COUNSELING: ICD-10-CM

## 2023-04-06 PROCEDURE — 97803 MED NUTRITION INDIV SUBSEQ: CPT | Mod: VID

## 2023-04-06 NOTE — LETTER
4/6/2023         RE: Cristina Eaton  [address]  [place] [address]        Dear Colleague,    Thank you for referring your patient, Cristina Eaton, to the [facility] CL[facility]IC AND BARIATRICS CARE [place]. Please see a copy of my visit note below.    Cristina Eaton is a 48 year old who is being evaluated via a billable video visit.        How would you like to obtain your AVS? MyChart  If the video visit is dropped, the invitation should be resent by: Text to cell phone: [phone]  Will anyone else be joining your video visit? No    Medical  Weight Loss Follow-Up Diet Evaluation  Assessment:  Cristina is presenting today for a follow up weight management nutrition consultation.  This patient has had an initial appointment and was referred by Dr. Ford for MNT as treatment for Obesity which is impacting her pain and mobility.    Weight loss medication: Bupropion. Naltrexone and phentermine    Pt's weight is 228.8 lbs  Initial weight: 245.8 lbs  Weight change: 17 lbs down         View : No data to display.              BMI: 33.3 kg/m2  Ideal body weight: 67.4 kg (148 lb 7.7 oz)  Adjusted ideal body weight: 82.3 kg (181 lb 6.5 oz)    Estimated RMR ([place]-St Jeor equation):   1,800 kcals x 1.2 (sedentary) = 2,170 kcals (for weight maintenance)  Recommended Protein Intake: 65-85 grams of protein/day  Patient Active Problem List:  Patient Active Problem List   Diagnosis     Anxiety Disorder NOS     Irritable bowel syndrome     Obesity     Menorrhagia with regular cycle     IUD (intrauterine device) in place     Asthma     Gastroesophageal reflux disease without esophagitis     Morbid obesity (H)     Knee pain     Diabetes: No    Progress on goals from last visit: Patient has reported she is doing well. Noted she is not snacking as often. Appetite is more controlled.   Noted she wants to get better at eating vegetables. Noted her energy levels are good and she can walk a flight of stairs much  easier.  +eating more fish    1. Go to [employer] 3x per week for at least 30 min - not met   2. Focus on eating protein first/ aiming for 20g at each meal - met  3. Continue tracking food in the [employer] dorita. - met     Dietary Recall:  Breakfast: protein shake in coffee or high protein yogurt with raspberries   Lunch: apple with Turkey slices and rice cake  Dinner: [employer]   Typical snacks: none  Eating out: <1x per week   Beverages: Water, diet coke (2), NA radha   Exercise: no routine at this time     Nutrition Diagnosis:    Overweight/Obesity (NC 3.3) related to overeating and poor lifestyle habits as evidenced by patient report of poor nutritional intake in the evening time, craving high carb food item and BMI 33.3 kg/m2      Intervention:  1. Food and/or nutrient delivery:   a. discussed cooking techniques to increase vegetable intake.  b. Discussed ways to increase hydration (water specifically)  2. Nutrition education: educated patient on importance of incorporating carbohydrates in diet and having balanced meals.  3. Nutrition counseling: continued support and goal setting      Monitoring/Evaluation:    Goals:  1. Aim to have a serving of vegetables at minimun lunch time   2. Buy a water bottle that will encourage increase water intake   3. Have once serving of nutrient dense carb with dinner     Handouts  Recipe recourse  [employer] handout     Carb information     Patient to follow up in 3 month(s) with bariatrician and 4 month(s) with RD      Video-Visit Details    Type of service:  Video Visit    Video Start Time (time video started): 3:26 pm    Video End Time (time video stopped): 3:43 pm    Originating Location (pt. Location): Home        Distant Location (provider location):  Off-site    Mode of Communication:  Video Conference via [facility]    Physician has received verbal consent for a Video Visit from the patient? Yes      Nury Singh RD            Again, thank you for allowing me to  participate in the care of your patient.        Sincerely,        Nury Singh RD

## 2023-04-06 NOTE — PROGRESS NOTES
Cristina Eaton is a 48 year old who is being evaluated via a billable video visit.        How would you like to obtain your AVS? MyChart  If the video visit is dropped, the invitation should be resent by: Text to cell phone: [phone]  Will anyone else be joining your video visit? No    Medical  Weight Loss Follow-Up Diet Evaluation  Assessment:  Cristina is presenting today for a follow up weight management nutrition consultation.  This patient has had an initial appointment and was referred by Dr. Ford for MNT as treatment for Obesity which is impacting her pain and mobility.    Weight loss medication: Bupropion. Naltrexone and phentermine    Pt's weight is 228.8 lbs  Initial weight: 245.8 lbs  Weight change: 17 lbs down         View : No data to display.              BMI: 33.3 kg/m2  Ideal body weight: 67.4 kg (148 lb 7.7 oz)  Adjusted ideal body weight: 82.3 kg (181 lb 6.5 oz)    Estimated RMR ([place]-St Jeor equation):   1,800 kcals x 1.2 (sedentary) = 2,170 kcals (for weight maintenance)  Recommended Protein Intake: 65-85 grams of protein/day  Patient Active Problem List:  Patient Active Problem List   Diagnosis     Anxiety Disorder NOS     Irritable bowel syndrome     Obesity     Menorrhagia with regular cycle     IUD (intrauterine device) in place     Asthma     Gastroesophageal reflux disease without esophagitis     Morbid obesity (H)     Knee pain     Diabetes: No    Progress on goals from last visit: Patient has reported she is doing well. Noted she is not snacking as often. Appetite is more controlled.   Noted she wants to get better at eating vegetables. Noted her energy levels are good and she can walk a flight of stairs much easier.  +eating more fish    1. Go to [employer] 3x per week for at least 30 min - not met   2. Focus on eating protein first/ aiming for 20g at each meal - met  3. Continue tracking food in the [employer] dorita. - met     Dietary Recall:  Breakfast: protein shake in coffee or high  protein yogurt with raspberries   Lunch: apple with Turkey slices and rice cake  Dinner: [employer]   Typical snacks: none  Eating out: <1x per week   Beverages: Water, diet coke (2), NA radha   Exercise: no routine at this time     Nutrition Diagnosis:    Overweight/Obesity (NC 3.3) related to overeating and poor lifestyle habits as evidenced by patient report of poor nutritional intake in the evening time, craving high carb food item and BMI 33.3 kg/m2      Intervention:  1. Food and/or nutrient delivery:   a. discussed cooking techniques to increase vegetable intake.  b. Discussed ways to increase hydration (water specifically)  2. Nutrition education: educated patient on importance of incorporating carbohydrates in diet and having balanced meals.  3. Nutrition counseling: continued support and goal setting      Monitoring/Evaluation:    Goals:  1. Aim to have a serving of vegetables at minimun lunch time   2. Buy a water bottle that will encourage increase water intake   3. Have once serving of nutrient dense carb with dinner     Handouts  Recipe recourse  [employer] handout     Carb information     Patient to follow up in 3 month(s) with bariatrician and 4 month(s) with RD      Video-Visit Details    Type of service:  Video Visit    Video Start Time (time video started): 3:26 pm    Video End Time (time video stopped): 3:43 pm    Originating Location (pt. Location): Home        Distant Location (provider location):  Off-site    Mode of Communication:  Video Conference via [facility]    Physician has received verbal consent for a Video Visit from the patient? Yes      Nury Singh RD

## 2023-07-06 ENCOUNTER — VIRTUAL VISIT (OUTPATIENT)
Dept: SURGERY | Facility: CLINIC | Age: 48
End: 2023-07-06
Payer: COMMERCIAL

## 2023-07-06 ENCOUNTER — OFFICE VISIT (OUTPATIENT)
Dept: SURGERY | Facility: CLINIC | Age: 48
End: 2023-07-06
Payer: COMMERCIAL

## 2023-07-06 VITALS
SYSTOLIC BLOOD PRESSURE: 148 MMHG | HEIGHT: 70 IN | WEIGHT: 234.5 LBS | DIASTOLIC BLOOD PRESSURE: 92 MMHG | BODY MASS INDEX: 33.57 KG/M2

## 2023-07-06 DIAGNOSIS — E88.819 INSULIN RESISTANCE: ICD-10-CM

## 2023-07-06 DIAGNOSIS — I10 PRIMARY HYPERTENSION: ICD-10-CM

## 2023-07-06 DIAGNOSIS — E66.811 CLASS 1 OBESITY WITH BODY MASS INDEX (BMI) OF 33.0 TO 33.9 IN ADULT, UNSPECIFIED OBESITY TYPE, UNSPECIFIED WHETHER SERIOUS COMORBIDITY PRESENT: Primary | ICD-10-CM

## 2023-07-06 DIAGNOSIS — F41.9 ANXIETY: ICD-10-CM

## 2023-07-06 DIAGNOSIS — E66.9 OBESITY (BMI 30-39.9): Primary | ICD-10-CM

## 2023-07-06 DIAGNOSIS — E88.810 METABOLIC SYNDROME: ICD-10-CM

## 2023-07-06 DIAGNOSIS — Z71.3 NUTRITIONAL COUNSELING: ICD-10-CM

## 2023-07-06 PROCEDURE — 97803 MED NUTRITION INDIV SUBSEQ: CPT | Mod: VID

## 2023-07-06 PROCEDURE — 99214 OFFICE O/P EST MOD 30 MIN: CPT | Performed by: FAMILY MEDICINE

## 2023-07-06 RX ORDER — METOPROLOL SUCCINATE 25 MG/1
25 TABLET, EXTENDED RELEASE ORAL DAILY
Qty: 90 TABLET | Refills: 3 | Status: SHIPPED | OUTPATIENT
Start: 2023-07-06 | End: 2024-03-21

## 2023-07-06 RX ORDER — PHENTERMINE HYDROCHLORIDE 37.5 MG/1
TABLET ORAL
COMMUNITY
Start: 2023-05-19 | End: 2023-08-10

## 2023-07-06 RX ORDER — PEN NEEDLE, DIABETIC 32GX 5/32"
NEEDLE, DISPOSABLE MISCELLANEOUS
Qty: 100 EACH | Refills: 3 | Status: SHIPPED | OUTPATIENT
Start: 2023-07-06 | End: 2024-04-25

## 2023-07-06 RX ORDER — METFORMIN HCL 500 MG
500 TABLET, EXTENDED RELEASE 24 HR ORAL 2 TIMES DAILY WITH MEALS
Qty: 180 TABLET | Refills: 3 | Status: SHIPPED | OUTPATIENT
Start: 2023-07-06 | End: 2024-06-13

## 2023-07-06 NOTE — PROGRESS NOTES
"Bariatric Follow-up    48 year old  female BMI:Body mass index is 34.13 kg/m .    Weight:   Wt Readings from Last 1 Encounters:   07/06/23 106.4 kg (234 lb 8 oz)    pounds      Comorbidities:  Patient Active Problem List   Diagnosis     Anxiety Disorder NOS     Irritable bowel syndrome     Obesity     Menorrhagia with regular cycle     IUD (intrauterine device) in place     Asthma     Gastroesophageal reflux disease without esophagitis     Morbid obesity (H)     Knee pain     Nutritional counseling       Interim: Lots of stress.  had abdominal surgery. \"15 yo son came out as siomn.\"  had gout. Stress Eating. Maintaining 11# weight loss. Had asthma when young but not needing inhaler for over 15 years.    Plan:  DIET  RD   EXERCISE start counting steps again. Planing to do a 5K .   PHARMACOTHERAPY saxenda, beta blocker for stress/anxiety and BP-f/u primary may change to ACEi/ARB and or hydrochlorothiazide.. Metformin for metabolic syndrome/insulin resistance.    gout info to prevent 's atacks     -We reviewed her medications and whether associated with weight gain.  Current Outpatient Medications   Medication     albuterol (PROAIR HFA/PROVENTIL HFA/VENTOLIN HFA) 108 (90 Base) MCG/ACT inhaler     buPROPion (WELLBUTRIN XL) 150 MG 24 hr tablet     levonorgestrel (MIRENA) 20 MCG/24HR IUD     naltrexone (DEPADE/REVIA) 50 MG tablet     phentermine (ADIPEX-P) 37.5 MG tablet     No current facility-administered medications for this visit.        We discussed [facility] Bariatric Basics including:  -eating 3 meals daily  -eating protein first  -eating slowly, chewing food well  -avoiding/limiting calorie containing beverages  -choosing wheat, not white with breads, crackers, pastas, frankie, bagels, tortillas, rice  -limiting restaurant or cafeteria eating to twice a week or less  -We discussed the importance of restorative sleep and stress management in maintaining a healthy weight.  -We discussed " insulin resistance and glycemic index as it relates to appetite and weight control  -We discussed the National Weight Control Registry healthy weight maintenance strategies and ways to optimize metabolism.  -We discussed the importance of physical activity including cardiovascular and strength training in maintaining a healthier weight and explored viable options.    Most recent labs:  Lab Results   Component Value Date    WBC 6.8 01/02/2019    HGB 13.6 01/02/2019    HCT 41.6 01/02/2019    MCV 90 01/02/2019     01/02/2019     Lab Results   Component Value Date    CHOL 158 07/08/2021     Lab Results   Component Value Date    HDL 44 (L) 07/08/2021       Lab Results   Component Value Date    TRIG 174 (H) 07/08/2021       Lab Results   Component Value Date    ALT 37 (H) 01/26/2023    AST 33 01/26/2023    ALKPHOS 83 01/26/2023       Lab Results   Component Value Date    TSH 1.26 01/26/2023       DIETARY HISTORY  Meals Per Day: 3  Eating Protein First?: yes  Food Diary: B:protein yogurt with berries, skips 2X/wk, breakfast sandwiches at home- L:catered 2d, leftovers, crackers and cheese D:pork chops, potatoes green beans  Snacks Per Day: lately after 8 pm  Typical Snack: all junk, chips, taco chips,   Fluid Intake: intentional  Portion Control: problematic  Calorie Containing Beverages: no  Alcohol per week: cut down significantly  To 7/wk or less  Typical Protein Food Choices: pork and beef  Choosing Whole Grains: some  Grocery Shopping is done by: herself  Food Preparation is done by: herself  Meals at Restaurant/Cafeteria/Take Out Per Week: 2 lunches and occ drive through  Eating at the Table: no  TV is Off During Meals:     Positive Changes Since Last Visit: 11# down  Struggling With: stress    Knowledgeable in Reading Food Labels: yes  Getting Adequate Protein: yes  Sleeping 7-8 hours/day occ  Stress management no    PHYSICAL ACTIVITY PATTERNS:  Cardiovascular: plans to train for a 5k  Strength Training:  "none    REVIEW OF SYSTEMS    PHYSICAL EXAM:  Vitals: BP (!) 148/92 (BP Location: Right arm, Patient Position: Sitting, Cuff Size: Adult Regular)   Ht 1.765 m (5' 9.5\")   Wt 106.4 kg (234 lb 8 oz)   BMI 34.13 kg/m        GEN: Pleasant, well groomed, in no acute distress  EYES: EOMI,  ENT: airway patent  NECK: no carotid bruits, no anterior/supraclavicular lymphadenopathy, thyroid normal   HEART: Rhythm regular, rate regular, no murmur   LUNGS: Clear  ABDOMEN: soft, non-tender, obese, no rashes   VASCULAR: no  lower extremity edema  MUSCULOSKELETAL:  muscle mass OK  SKIN:  no color changes of venous stasis, no ulcerations    Total time spent on the date of this encounter doing: chart review, review of test results, patient visit, physical exam, education, counseling, developing plan of care, and documenting = 30minutes.          "

## 2023-07-06 NOTE — LETTER
"    7/6/2023         RE: Cristina Eaton  [address] [place] [address]        Dear Colleague,    Thank you for referring your patient, Cristina Eaton, to the [facility] [place]. Please see a copy of my visit note below.    Bariatric Follow-up    48 year old  female BMI:Body mass index is 34.13 kg/m .    Weight:   Wt Readings from Last 1 Encounters:   07/06/23 106.4 kg (234 lb 8 oz)    pounds      Comorbidities:  Patient Active Problem List   Diagnosis     Anxiety Disorder NOS     Irritable bowel syndrome     Obesity     Menorrhagia with regular cycle     IUD (intrauterine device) in place     Asthma     Gastroesophageal reflux disease without esophagitis     Morbid obesity (H)     Knee pain     Nutritional counseling       Interim: Lots of stress.  had abdominal surgery. \"15 yo son came out as simon.\"  had gout. Stress Eating. Maintaining 11# weight loss. Had asthma when young but not needing inhaler for over 15 years.    Plan:  DIET  RD   EXERCISE start counting steps again. Planing to do a 5K .   PHARMACOTHERAPY saxenda, beta blocker for stress/anxiety and BP-f/u primary may change to ACEi/ARB and or hydrochlorothiazide.. Metformin for metabolic syndrome/insulin resistance.    gout info to prevent 's atacks     -We reviewed her medications and whether associated with weight gain.  Current Outpatient Medications   Medication     albuterol (PROAIR HFA/PROVENTIL HFA/VENTOLIN HFA) 108 (90 Base) MCG/ACT inhaler     buPROPion (WELLBUTRIN XL) 150 MG 24 hr tablet     levonorgestrel (MIRENA) 20 MCG/24HR IUD     naltrexone (DEPADE/REVIA) 50 MG tablet     phentermine (ADIPEX-P) 37.5 MG tablet     No current facility-administered medications for this visit.        We discussed [facility] Bariatric Basics including:  -eating 3 meals daily  -eating protein first  -eating slowly, chewing food well  -avoiding/limiting calorie containing " beverages  -choosing wheat, not white with breads, crackers, pastas, frankie, bagels, tortillas, rice  -limiting restaurant or cafeteria eating to twice a week or less  -We discussed the importance of restorative sleep and stress management in maintaining a healthy weight.  -We discussed insulin resistance and glycemic index as it relates to appetite and weight control  -We discussed the National Weight Control Registry healthy weight maintenance strategies and ways to optimize metabolism.  -We discussed the importance of physical activity including cardiovascular and strength training in maintaining a healthier weight and explored viable options.    Most recent labs:  Lab Results   Component Value Date    WBC 6.8 01/02/2019    HGB 13.6 01/02/2019    HCT 41.6 01/02/2019    MCV 90 01/02/2019     01/02/2019     Lab Results   Component Value Date    CHOL 158 07/08/2021     Lab Results   Component Value Date    HDL 44 (L) 07/08/2021       Lab Results   Component Value Date    TRIG 174 (H) 07/08/2021       Lab Results   Component Value Date    ALT 37 (H) 01/26/2023    AST 33 01/26/2023    ALKPHOS 83 01/26/2023       Lab Results   Component Value Date    TSH 1.26 01/26/2023       DIETARY HISTORY  Meals Per Day: 3  Eating Protein First?: yes  Food Diary: B:protein yogurt with berries, skips 2X/wk, breakfast sandwiches at home- L:catered 2d, leftovers, crackers and cheese D:pork chops, potatoes green beans  Snacks Per Day: lately after 8 pm  Typical Snack: all junk, chips, taco chips,   Fluid Intake: intentional  Portion Control: problematic  Calorie Containing Beverages: no  Alcohol per week: cut down significantly  To 7/wk or less  Typical Protein Food Choices: pork and beef  Choosing Whole Grains: some  Grocery Shopping is done by: herself  Food Preparation is done by: herself  Meals at Restaurant/Cafeteria/Take Out Per Week: 2 lunches and occ drive through  Eating at the Table: no  TV is Off During Meals:  "    Positive Changes Since Last Visit: 11# down  Struggling With: stress    Knowledgeable in Reading Food Labels: yes  Getting Adequate Protein: yes  Sleeping 7-8 hours/day occ  Stress management no    PHYSICAL ACTIVITY PATTERNS:  Cardiovascular: plans to train for a 5k  Strength Training: none    REVIEW OF SYSTEMS    PHYSICAL EXAM:  Vitals: BP (!) 148/92 (BP Location: Right arm, Patient Position: Sitting, Cuff Size: Adult Regular)   Ht 1.765 m (5' 9.5\")   Wt 106.4 kg (234 lb 8 oz)   BMI 34.13 kg/m        GEN: Pleasant, well groomed, in no acute distress  EYES: EOMI,  ENT: airway patent  NECK: no carotid bruits, no anterior/supraclavicular lymphadenopathy, thyroid normal   HEART: Rhythm regular, rate regular, no murmur   LUNGS: Clear  ABDOMEN: soft, non-tender, obese, no rashes   VASCULAR: no  lower extremity edema  MUSCULOSKELETAL:  muscle mass OK  SKIN:  no color changes of venous stasis, no ulcerations    Total time spent on the date of this encounter doing: chart review, review of test results, patient visit, physical exam, education, counseling, developing plan of care, and documenting = 30minutes.              Again, thank you for allowing me to participate in the care of your patient.        Sincerely,        Arpita Ford MD    "

## 2023-07-06 NOTE — PATIENT INSTRUCTIONS
Recipe Resources  Websites  www.[employer].TradingScreen  www.[employer].TradingScreen  www.[employer].TradingScreen  https://www.[employer].TradingScreen/recipes-ideas/recipes  https://Opiatalk.TradingScreen/  www.Work in Fieldrecipes.com  https://www.hungryZoomSystemsgirl.com/  Recipes  Real Life, Good Food ([facility])         Lean Protein Sources    Protein Source Portion Calories Grams of Protein                           Nonfat, plain Greek yogurt    (10 grams sugar or less) 3/4 cup (6 oz)  12-17   Light Yogurt (10 grams sugar or less) 3/4 cup (6 oz)  6-8   Protein Shake 1 shake 110-180 15-30   Skim/1% Milk or lactose-free milk 1 cup ( 8 oz)  8   Plain or light, flavored soymilk 1 cup  7-8   Plain or light, hemp milk 1 cup 110 6   Fat Free or 1% Cottage Cheese 1/2 cup 90 15   Part skim ricotta cheese 1/2 cup 100 14   Part skim or reduced fat cheese slices 1/4cup, 3 dice 65-80 8     Mozzarella String Cheese 1 80 8   Canned tuna, chicken, crab or salmon  (canned in water)  1/2 cup 100 15-20   White fish (broiled, grilled, baked) 3 ounces 100 21   [place]/Tuna (broiled, grilled, baked) 3 ounces 150-180 21   Shrimp, Scallops, Lobster, Crab 3 ounces 100 21   Pork loin, Pork Tenderloin 3 ounces 150 21   Boneless, skinless chicken /turkey breast                          (broiled, grilled, baked) 3 ounces 120 21   [place], [place], [place]sk, and Venison 3 ounces 120 21   Lean cuts of red meat and pork (sirloin,   round, tenderloin, flank, ground 93%-96%) 3 ounces 170 21   Lean or Extra Lean Ground Turkey 1/2 cup 150 20   90-95% Lean [place] Burger 1 brigid 140-180 21   Low-fat casserole with lean meat 3/4 cup 200 17   Luncheon Meats                                                        (turkey, lean ham, roast beef, chicken) 3 ounces    100 21   Egg (boiled, poached, scrambled) 1 Egg 60 7   Egg Substitute 1/2 cup 70 10   Nuts (limit to 1 serving per day)  3 Tbsp. 150 7   Nut [place] (peanut, almond)  Limit to 1 serving or less daily 1 Tbsp. 90 4   Soy Burger  (varies) 1  10-15   Garbanzo, Black, [place] Beans/Edamame 1/2 cup 110 7-9   Refried Beans 1/2 cup 100 7   Kidney and Lima beans 1/2 cup 110 7   Tempeh 3 oz 175 18   Vegan crumbles 1/2 cup 100 14   Tofu 1/2 cup 110 14   Chili (beans and extra lean beef or turkey) 1 cup 200 23   Lentils 1/2 cup 115 9   Black Bean Soup 1 cup 175 12      150 Calories or Less Snack Ideas   1 hardboiled egg with   cup berries  1 small apple with 1 hardboiled egg  10 almonds with   cup berries  2 clementines with 1 light string cheese  1 light string cheese with   sliced apple  1 light string cheese wrapped in 2 slices of turkey  5 100% whole wheat crackers (e.g. Triscuit) with 1 light string cheese    c. cottage cheese with   cup fruit and 1 Tbsp sunflower seeds     cup cottage cheese with   of an avocado     can tuna fish with 1 cup sliced cucumbers   2 oz turkey slices with 1 cup carrots  1 container (6 oz) of low sugar (less than 10 grams of sugar) greek yogurt   3 Tablespoons of hummus with 1 cup sliced bell peppers, carrots or vegetable of your choice  4 Tablespoons ranch dip made with plain Greek Yogurt and 3 mini cucumbers  1/4 cup nuts (any kind)  1 Tablespoon peanut butter with 1 stalk celery   1 dill pickle wrapped in 1-2 slices of deli ham with 1 tsp of light cream cheese  5 100% whole wheat crackers (e.g. Triscuit) with 1 tsp each of guacamole/avocado topped with a cherry tomato - season with pepper or Everything Bagel Seasoning

## 2023-07-06 NOTE — LETTER
7/6/2023         RE: Cristina Eaton  [address]  [place] [address]        Dear Colleague,    Thank you for referring your patient, Cristina Eaton, to the [facility] [place]. Please see a copy of my visit note below.    Cristina Eaton is a 48 year old who is being evaluated via a billable video visit.        How would you like to obtain your AVS? [facility]      Medical  Weight Loss Follow-Up Diet Evaluation  Assessment:  Cristina is presenting today for a follow up weight management nutrition consultation.  This patient has had an initial appointment and was referred by Dr. Ford for MNT as treatment for Obesity which is impacting her pain and mobility.    Weight loss medication: Saxenda\. Metformin, Wellbutrin and phentermine    Pt's weight is 234 lbs  Initial weight: 245.8 lbs  Weight change: 11 lbs down        7/5/2023     3:43 PM   Changes and Difficulties   I have made the following changes to my diet since my last visit: I kind of fell off the wagon. :\   With regards to my diet, I am still struggling with: snacking, late night   I have made the following changes to my activity/exercise since my last visit: decreased, but want to get back into it.   With regards to my activity/exercise, I am still struggling with: getting to the gym     BMI: 34.13 kg/m2  Ideal body weight: 67.4 kg (148 lb 7.7 oz)  Adjusted ideal body weight: 82.3 kg (181 lb 6.5 oz)    Estimated RMR ([place]-St Jeor equation):   1,800 kcals x 1.2 (sedentary) = 2,170 kcals (for weight maintenance)  Recommended Protein Intake: 65-85 grams of protein/day  Patient Active Problem List:  Patient Active Problem List   Diagnosis     Anxiety Disorder NOS     Irritable bowel syndrome     Obesity     Menorrhagia with regular cycle     IUD (intrauterine device) in place     Asthma     Gastroesophageal reflux disease without esophagitis     Morbid obesity (H)     Knee pain     Nutritional counseling      Diabetes: No    Progress on goals from last visit: Patient noted she has paused on taking taking phentermine for ~3 weeks. Metformin and Saxenda recently ordered via Dr. Ford this date. Noted family stressors have altered her progress and she fell into hold habits (snakcing in the evening time). Wanting to increase exercising habits     1. Aim to have a serving of vegetables at minimun lunch time - met   2. Buy a water bottle that will encourage increase water intake - met  3. Have once serving of nutrient dense carb with dinner - met       Dietary Recall:  Breakfast:  high protein yogurt with raspberries   Lunch: food brought at work (cheese and crackers)  Dinner: pork chops, potatoes green beans  Typical snacks: chips  Eating out: <1x per week   Beverages: Water, diet coke (2), white claws  Exercise: no routine at this time   Nutrition Diagnosis:    Obesity (NC 3.3) related to overeating and poor lifestyle habits as evidenced by patient report of poor nutritional intake in the evening time, craving high carb food item and BMI 34.13 kg/m2      Intervention:  4. Food and/or nutrient delivery:   a. Discussed snack pairings and nutrition facts label reading  5. Nutrition education:   a. educated patient on importance of paring protein and carb's when snacking  6. Nutrition counseling: continued support and goal setting      Monitoring/Evaluation:    Goals:  1. Aim to have at least 20g protein at each meal (pair protein and carbs)  2. Stick to 1,300-1,600 kcal per day   3. Get backing routine habits     Handouts      Patient to follow up in 3 month(s) with KATEY      Telephone Details    Duration: 20 minutes     Originating Location (pt. Location): Home        Distant Location (provider location):  Off-site    Mode of Communication:  Telephone Conference via [facility]     Physician has received verbal consent for a Video Visit from the patient? Yes      Nury Singh RD          Again, thank you for allowing me to  participate in the care of your patient.        Sincerely,        Nury Singh RD

## 2023-07-06 NOTE — PROGRESS NOTES
Cristina Eaton is a 48 year old who is being evaluated via a billable video visit.        How would you like to obtain your AVS? [facility]  Weight Loss Follow-Up Diet Evaluation  Assessment:  Cristina is presenting today for a follow up weight management nutrition consultation.  This patient has had an initial appointment and was referred by Dr. Ford for MNT as treatment for Obesity which is impacting her pain and mobility.    Weight loss medication: Saxenda\. Metformin, Wellbutrin and phentermine    Pt's weight is 234 lbs  Initial weight: 245.8 lbs  Weight change: 11 lbs down        7/5/2023     3:43 PM   Changes and Difficulties   I have made the following changes to my diet since my last visit: I kind of fell off the wagon. :\   With regards to my diet, I am still struggling with: snacking, late night   I have made the following changes to my activity/exercise since my last visit: decreased, but want to get back into it.   With regards to my activity/exercise, I am still struggling with: getting to the gym     BMI: 34.13 kg/m2  Ideal body weight: 67.4 kg (148 lb 7.7 oz)  Adjusted ideal body weight: 82.3 kg (181 lb 6.5 oz)    Estimated RMR ([place]-St Jeor equation):   1,800 kcals x 1.2 (sedentary) = 2,170 kcals (for weight maintenance)  Recommended Protein Intake: 65-85 grams of protein/day  Patient Active Problem List:  Patient Active Problem List   Diagnosis     Anxiety Disorder NOS     Irritable bowel syndrome     Obesity     Menorrhagia with regular cycle     IUD (intrauterine device) in place     Asthma     Gastroesophageal reflux disease without esophagitis     Morbid obesity (H)     Knee pain     Nutritional counseling     Diabetes: No    Progress on goals from last visit: Patient noted she has paused on taking taking phentermine for ~3 weeks. Metformin and Saxenda recently ordered via Dr. Ford this date. Noted family stressors have altered her progress and she fell into hold habits (snakcing in the  evening time). Wanting to increase exercising habits     1. Aim to have a serving of vegetables at minimun lunch time - met   2. Buy a water bottle that will encourage increase water intake - met  3. Have once serving of nutrient dense carb with dinner - met       Dietary Recall:  Breakfast:  high protein yogurt with raspberries   Lunch: food brought at work (cheese and crackers)  Dinner: pork chops, potatoes green beans  Typical snacks: chips  Eating out: <1x per week   Beverages: Water, diet coke (2), white claws  Exercise: no routine at this time   Nutrition Diagnosis:    Obesity (NC 3.3) related to overeating and poor lifestyle habits as evidenced by patient report of poor nutritional intake in the evening time, craving high carb food item and BMI 34.13 kg/m2      Intervention:  4. Food and/or nutrient delivery:   a. Discussed snack pairings and nutrition facts label reading  5. Nutrition education:   a. educated patient on importance of paring protein and carb's when snacking  6. Nutrition counseling: continued support and goal setting      Monitoring/Evaluation:    Goals:  1. Aim to have at least 20g protein at each meal (pair protein and carbs)  2. Stick to 1,300-1,600 kcal per day   3. Get backing routine habits     Handouts      Patient to follow up in 3 month(s) with KATEY      Telephone Details    Duration: 20 minutes     Originating Location (pt. Location): Home        Distant Location (provider location):  Off-site    Mode of Communication:  Telephone Conference via [facility]     Physician has received verbal consent for a Video Visit from the patient? Yes      Nury Singh RD

## 2023-07-31 ENCOUNTER — PATIENT OUTREACH (OUTPATIENT)
Dept: CARE COORDINATION | Facility: CLINIC | Age: 48
End: 2023-07-31
Payer: COMMERCIAL

## 2023-08-08 DIAGNOSIS — E66.811 CLASS 1 OBESITY WITH BODY MASS INDEX (BMI) OF 33.0 TO 33.9 IN ADULT, UNSPECIFIED OBESITY TYPE, UNSPECIFIED WHETHER SERIOUS COMORBIDITY PRESENT: ICD-10-CM

## 2023-08-10 RX ORDER — PHENTERMINE HYDROCHLORIDE 37.5 MG/1
TABLET ORAL
Qty: 90 TABLET | Refills: 1 | Status: SHIPPED | OUTPATIENT
Start: 2023-08-10 | End: 2024-04-25

## 2023-08-28 ENCOUNTER — PATIENT OUTREACH (OUTPATIENT)
Dept: CARE COORDINATION | Facility: CLINIC | Age: 48
End: 2023-08-28
Payer: COMMERCIAL

## 2023-08-29 ENCOUNTER — MYC MEDICAL ADVICE (OUTPATIENT)
Dept: SURGERY | Facility: CLINIC | Age: 48
End: 2023-08-29
Payer: COMMERCIAL

## 2023-08-29 DIAGNOSIS — E66.9 OBESITY (BMI 30-39.9): Primary | ICD-10-CM

## 2023-08-29 DIAGNOSIS — E66.9 OBESITY (BMI 30-39.9): ICD-10-CM

## 2023-08-29 RX ORDER — SEMAGLUTIDE 1.7 MG/.75ML
1.7 INJECTION, SOLUTION SUBCUTANEOUS WEEKLY
Qty: 3 ML | Refills: 0 | Status: SHIPPED | OUTPATIENT
Start: 2023-08-29 | End: 2023-08-30

## 2023-08-29 RX ORDER — SEMAGLUTIDE 2.4 MG/.75ML
2.4 INJECTION, SOLUTION SUBCUTANEOUS WEEKLY
Qty: 9 ML | Refills: 3 | Status: SHIPPED | OUTPATIENT
Start: 2023-08-29 | End: 2023-08-30

## 2023-08-30 ENCOUNTER — TELEPHONE (OUTPATIENT)
Dept: SURGERY | Facility: CLINIC | Age: 48
End: 2023-08-30

## 2023-08-30 RX ORDER — SEMAGLUTIDE 1.7 MG/.75ML
1.7 INJECTION, SOLUTION SUBCUTANEOUS WEEKLY
Qty: 3 ML | Refills: 0 | Status: SHIPPED | OUTPATIENT
Start: 2023-08-30 | End: 2024-03-21

## 2023-08-30 RX ORDER — SEMAGLUTIDE 2.4 MG/.75ML
2.4 INJECTION, SOLUTION SUBCUTANEOUS WEEKLY
Qty: 9 ML | Refills: 3 | Status: SHIPPED | OUTPATIENT
Start: 2023-09-27 | End: 2024-04-25

## 2023-08-30 NOTE — TELEPHONE ENCOUNTER
Prior Authorization Retail Medication Request    Medication/Dose: Semaglutide-Weight Management (WEGOVY) 1.7 MG/0.75ML pen   ICD code (if different than what is on RX):  Obesity (BMI 30-39.9) [E66.9]   Previously Tried and Failed:    Rationale:      Insurance Name:  PREFERREDONE   Insurance ID:  [id number]     Pharmacy Information (if different than what is on RX)  Name:  [id number] - [place], [place] - [address]   Phone:  [phone]

## 2023-08-30 NOTE — TELEPHONE ENCOUNTER
Changed the location of the prescription to [facility] per the patient request.  Teresa Aguirre RN

## 2023-08-31 NOTE — TELEPHONE ENCOUNTER
Central Prior Authorization Team   Phone: [phone]    PA Initiation    Medication: WEGOVY 1.7 MG/0.75ML SC SOAJ  Insurance Company: [employer] - Phone [phone] Fax [phone]  Pharmacy Filling the Rx: [id number] - [place], [place] - [address]  Filling Pharmacy Phone: [phone]  Filling Pharmacy Fax:    Start Date: 8/31/2023

## 2023-09-03 NOTE — TELEPHONE ENCOUNTER
Prior Authorization Not Needed per Insurance    Medication: WEGOVY 1.7 MG/0.75ML SC SOAJ  Insurance Company: [employer] - Phone [phone] Fax [phone]  Expected CoPay:      Pharmacy Filling the Rx: [id number] - [place], [place] - [address]

## 2023-09-06 NOTE — TELEPHONE ENCOUNTER
Prior Authorization Not Needed per Insurance    Medication: WEGOVY 1.7 MG/0.75ML SC SOAJ  Insurance Company: [employer] - Phone [phone] Fax [phone]  Expected CoPay:      Pharmacy Filling the Rx: [id number] - [place], [place] - [address]  Pharmacy Notified:  Yes  Patient Notified:  No    Notified pharmacy that PA is not needed and to reprocess script however, tech stated they are back up at the moment and will process it later. They will call back if any issues. Otherwise, they will notify patient when ready for .

## 2023-09-14 SDOH — ECONOMIC STABILITY: FOOD INSECURITY: WITHIN THE PAST 12 MONTHS, THE FOOD YOU BOUGHT JUST DIDN'T LAST AND YOU DIDN'T HAVE MONEY TO GET MORE.: SOMETIMES TRUE

## 2023-09-14 SDOH — ECONOMIC STABILITY: TRANSPORTATION INSECURITY
IN THE PAST 12 MONTHS, HAS THE LACK OF TRANSPORTATION KEPT YOU FROM MEDICAL APPOINTMENTS OR FROM GETTING MEDICATIONS?: NO

## 2023-09-14 SDOH — HEALTH STABILITY: PHYSICAL HEALTH: ON AVERAGE, HOW MANY DAYS PER WEEK DO YOU ENGAGE IN MODERATE TO STRENUOUS EXERCISE (LIKE A BRISK WALK)?: 1 DAY

## 2023-09-14 SDOH — HEALTH STABILITY: PHYSICAL HEALTH: ON AVERAGE, HOW MANY MINUTES DO YOU ENGAGE IN EXERCISE AT THIS LEVEL?: 20 MIN

## 2023-09-14 SDOH — ECONOMIC STABILITY: INCOME INSECURITY: HOW HARD IS IT FOR YOU TO PAY FOR THE VERY BASICS LIKE FOOD, HOUSING, MEDICAL CARE, AND HEATING?: SOMEWHAT HARD

## 2023-09-14 SDOH — ECONOMIC STABILITY: TRANSPORTATION INSECURITY
IN THE PAST 12 MONTHS, HAS LACK OF TRANSPORTATION KEPT YOU FROM MEETINGS, WORK, OR FROM GETTING THINGS NEEDED FOR DAILY LIVING?: NO

## 2023-09-14 SDOH — ECONOMIC STABILITY: FOOD INSECURITY: WITHIN THE PAST 12 MONTHS, YOU WORRIED THAT YOUR FOOD WOULD RUN OUT BEFORE YOU GOT MONEY TO BUY MORE.: SOMETIMES TRUE

## 2023-09-14 SDOH — ECONOMIC STABILITY: INCOME INSECURITY: IN THE LAST 12 MONTHS, WAS THERE A TIME WHEN YOU WERE NOT ABLE TO PAY THE MORTGAGE OR RENT ON TIME?: NO

## 2023-09-14 ASSESSMENT — LIFESTYLE VARIABLES
HOW OFTEN DO YOU HAVE SIX OR MORE DRINKS ON ONE OCCASION: NEVER
HOW MANY STANDARD DRINKS CONTAINING ALCOHOL DO YOU HAVE ON A TYPICAL DAY: 3 OR 4
HOW OFTEN DO YOU HAVE A DRINK CONTAINING ALCOHOL: 2-4 TIMES A MONTH
AUDIT-C TOTAL SCORE: 3
SKIP TO QUESTIONS 9-10: 0

## 2023-09-14 ASSESSMENT — ENCOUNTER SYMPTOMS
ARTHRALGIAS: 0
COUGH: 0
MYALGIAS: 0
NAUSEA: 0
HEMATURIA: 0
HEARTBURN: 0
WEAKNESS: 0
EYE PAIN: 0
FREQUENCY: 0
DIARRHEA: 0
HEMATOCHEZIA: 0
DIZZINESS: 0
BREAST MASS: 0
NERVOUS/ANXIOUS: 1
CONSTIPATION: 0
HEADACHES: 0
DYSURIA: 0
PALPITATIONS: 0
SORE THROAT: 0
ABDOMINAL PAIN: 0
JOINT SWELLING: 0
FEVER: 0
PARESTHESIAS: 0
CHILLS: 0
SHORTNESS OF BREATH: 0

## 2023-09-14 ASSESSMENT — SOCIAL DETERMINANTS OF HEALTH (SDOH)
DO YOU BELONG TO ANY CLUBS OR ORGANIZATIONS SUCH AS CHURCH GROUPS UNIONS, FRATERNAL OR ATHLETIC GROUPS, OR SCHOOL GROUPS?: YES
HOW OFTEN DO YOU ATTEND CHURCH OR RELIGIOUS SERVICES?: MORE THAN 4 TIMES PER YEAR
IN A TYPICAL WEEK, HOW MANY TIMES DO YOU TALK ON THE PHONE WITH FAMILY, FRIENDS, OR NEIGHBORS?: THREE TIMES A WEEK
HOW OFTEN DO YOU GET TOGETHER WITH FRIENDS OR RELATIVES?: ONCE A WEEK

## 2023-09-14 ASSESSMENT — ASTHMA QUESTIONNAIRES: ACT_TOTALSCORE: 25

## 2023-09-20 ENCOUNTER — ANCILLARY PROCEDURE (OUTPATIENT)
Dept: MAMMOGRAPHY | Facility: CLINIC | Age: 48
End: 2023-09-20
Attending: NURSE PRACTITIONER
Payer: COMMERCIAL

## 2023-09-20 DIAGNOSIS — Z12.31 VISIT FOR SCREENING MAMMOGRAM: ICD-10-CM

## 2023-09-20 PROCEDURE — 77067 SCR MAMMO BI INCL CAD: CPT | Mod: TC | Performed by: RADIOLOGY

## 2023-09-21 ENCOUNTER — OFFICE VISIT (OUTPATIENT)
Dept: PEDIATRICS | Facility: CLINIC | Age: 48
End: 2023-09-21
Payer: COMMERCIAL

## 2023-09-21 VITALS
BODY MASS INDEX: 31.59 KG/M2 | WEIGHT: 213.3 LBS | SYSTOLIC BLOOD PRESSURE: 139 MMHG | OXYGEN SATURATION: 98 % | TEMPERATURE: 97.3 F | DIASTOLIC BLOOD PRESSURE: 87 MMHG | HEART RATE: 103 BPM | HEIGHT: 69 IN | RESPIRATION RATE: 20 BRPM

## 2023-09-21 DIAGNOSIS — Z13.29 SCREENING FOR THYROID DISORDER: ICD-10-CM

## 2023-09-21 DIAGNOSIS — Z12.4 CERVICAL CANCER SCREENING: ICD-10-CM

## 2023-09-21 DIAGNOSIS — Z13.0 SCREENING, IRON DEFICIENCY ANEMIA: ICD-10-CM

## 2023-09-21 DIAGNOSIS — Z00.00 ROUTINE GENERAL MEDICAL EXAMINATION AT A HEALTH CARE FACILITY: Primary | ICD-10-CM

## 2023-09-21 DIAGNOSIS — Z13.1 SCREENING FOR DIABETES MELLITUS: ICD-10-CM

## 2023-09-21 DIAGNOSIS — F41.1 ANXIETY STATE: ICD-10-CM

## 2023-09-21 DIAGNOSIS — Z12.11 SCREEN FOR COLON CANCER: ICD-10-CM

## 2023-09-21 DIAGNOSIS — F32.A ANXIETY AND DEPRESSION: ICD-10-CM

## 2023-09-21 DIAGNOSIS — E66.01 MORBID OBESITY (H): ICD-10-CM

## 2023-09-21 DIAGNOSIS — F41.9 ANXIETY AND DEPRESSION: ICD-10-CM

## 2023-09-21 DIAGNOSIS — Z13.21 ENCOUNTER FOR VITAMIN DEFICIENCY SCREENING: ICD-10-CM

## 2023-09-21 DIAGNOSIS — R05.9 COUGH, UNSPECIFIED TYPE: ICD-10-CM

## 2023-09-21 DIAGNOSIS — Z13.220 SCREENING CHOLESTEROL LEVEL: ICD-10-CM

## 2023-09-21 LAB
ALBUMIN SERPL BCG-MCNC: 4.3 G/DL (ref 3.5–5.2)
ALP SERPL-CCNC: 81 U/L (ref 35–104)
ALT SERPL W P-5'-P-CCNC: 19 U/L (ref 0–50)
ANION GAP SERPL CALCULATED.3IONS-SCNC: 12 MMOL/L (ref 7–15)
AST SERPL W P-5'-P-CCNC: 20 U/L (ref 0–45)
BILIRUB SERPL-MCNC: 0.7 MG/DL
BUN SERPL-MCNC: 13.1 MG/DL (ref 6–20)
CALCIUM SERPL-MCNC: 9.1 MG/DL (ref 8.6–10)
CHLORIDE SERPL-SCNC: 103 MMOL/L (ref 98–107)
CHOLEST SERPL-MCNC: 135 MG/DL
CREAT SERPL-MCNC: 0.94 MG/DL (ref 0.51–0.95)
DEPRECATED HCO3 PLAS-SCNC: 24 MMOL/L (ref 22–29)
EGFRCR SERPLBLD CKD-EPI 2021: 75 ML/MIN/1.73M2
ERYTHROCYTE [DISTWIDTH] IN BLOOD BY AUTOMATED COUNT: 13.4 % (ref 10–15)
GLUCOSE SERPL-MCNC: 81 MG/DL (ref 70–99)
HBA1C MFR BLD: 5.2 % (ref 0–5.6)
HCT VFR BLD AUTO: 42 % (ref 35–47)
HDLC SERPL-MCNC: 41 MG/DL
HGB BLD-MCNC: 14 G/DL (ref 11.7–15.7)
LDLC SERPL CALC-MCNC: 74 MG/DL
MCH RBC QN AUTO: 29.7 PG (ref 26.5–33)
MCHC RBC AUTO-ENTMCNC: 33.3 G/DL (ref 31.5–36.5)
MCV RBC AUTO: 89 FL (ref 78–100)
NONHDLC SERPL-MCNC: 94 MG/DL
PLATELET # BLD AUTO: 312 10E3/UL (ref 150–450)
POTASSIUM SERPL-SCNC: 4 MMOL/L (ref 3.4–5.3)
PROT SERPL-MCNC: 7 G/DL (ref 6.4–8.3)
RBC # BLD AUTO: 4.71 10E6/UL (ref 3.8–5.2)
SODIUM SERPL-SCNC: 139 MMOL/L (ref 136–145)
TRIGL SERPL-MCNC: 100 MG/DL
TSH SERPL DL<=0.005 MIU/L-ACNC: 1.04 UIU/ML (ref 0.3–4.2)
WBC # BLD AUTO: 7.1 10E3/UL (ref 4–11)

## 2023-09-21 PROCEDURE — 87624 HPV HI-RISK TYP POOLED RSLT: CPT | Performed by: STUDENT IN AN ORGANIZED HEALTH CARE EDUCATION/TRAINING PROGRAM

## 2023-09-21 PROCEDURE — 83036 HEMOGLOBIN GLYCOSYLATED A1C: CPT | Performed by: STUDENT IN AN ORGANIZED HEALTH CARE EDUCATION/TRAINING PROGRAM

## 2023-09-21 PROCEDURE — 85027 COMPLETE CBC AUTOMATED: CPT | Performed by: STUDENT IN AN ORGANIZED HEALTH CARE EDUCATION/TRAINING PROGRAM

## 2023-09-21 PROCEDURE — 36415 COLL VENOUS BLD VENIPUNCTURE: CPT | Performed by: STUDENT IN AN ORGANIZED HEALTH CARE EDUCATION/TRAINING PROGRAM

## 2023-09-21 PROCEDURE — 84443 ASSAY THYROID STIM HORMONE: CPT | Performed by: STUDENT IN AN ORGANIZED HEALTH CARE EDUCATION/TRAINING PROGRAM

## 2023-09-21 PROCEDURE — 80061 LIPID PANEL: CPT | Performed by: STUDENT IN AN ORGANIZED HEALTH CARE EDUCATION/TRAINING PROGRAM

## 2023-09-21 PROCEDURE — 80053 COMPREHEN METABOLIC PANEL: CPT | Performed by: STUDENT IN AN ORGANIZED HEALTH CARE EDUCATION/TRAINING PROGRAM

## 2023-09-21 PROCEDURE — 82306 VITAMIN D 25 HYDROXY: CPT | Performed by: STUDENT IN AN ORGANIZED HEALTH CARE EDUCATION/TRAINING PROGRAM

## 2023-09-21 PROCEDURE — 99396 PREV VISIT EST AGE 40-64: CPT | Performed by: STUDENT IN AN ORGANIZED HEALTH CARE EDUCATION/TRAINING PROGRAM

## 2023-09-21 PROCEDURE — G0145 SCR C/V CYTO,THINLAYER,RESCR: HCPCS | Performed by: STUDENT IN AN ORGANIZED HEALTH CARE EDUCATION/TRAINING PROGRAM

## 2023-09-21 RX ORDER — ALBUTEROL SULFATE 90 UG/1
2 AEROSOL, METERED RESPIRATORY (INHALATION) EVERY 6 HOURS PRN
Qty: 18 G | Refills: 3 | Status: SHIPPED | OUTPATIENT
Start: 2023-09-21

## 2023-09-21 RX ORDER — BUPROPION HYDROCHLORIDE 150 MG/1
150 TABLET ORAL EVERY MORNING
Qty: 90 TABLET | Refills: 4 | Status: SHIPPED | OUTPATIENT
Start: 2023-09-21

## 2023-09-21 ASSESSMENT — ENCOUNTER SYMPTOMS
SHORTNESS OF BREATH: 0
PARESTHESIAS: 0
WEAKNESS: 0
CONSTIPATION: 0
PALPITATIONS: 0
FEVER: 0
DIZZINESS: 0
HEMATURIA: 0
SORE THROAT: 0
MYALGIAS: 0
HEMATOCHEZIA: 0
ARTHRALGIAS: 0
NERVOUS/ANXIOUS: 1
FREQUENCY: 0
HEADACHES: 0
NAUSEA: 0
HEARTBURN: 0
JOINT SWELLING: 0
ABDOMINAL PAIN: 0
DIARRHEA: 0
BREAST MASS: 0
DYSURIA: 0
COUGH: 0
EYE PAIN: 0
CHILLS: 0

## 2023-09-21 ASSESSMENT — PAIN SCALES - GENERAL: PAINLEVEL: NO PAIN (0)

## 2023-09-21 NOTE — PROGRESS NOTES
SUBJECTIVE:   CC: Cristina is an 48 year old who presents for preventive health visit.       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  Son in high school   to     Had shingles 15 years ago      Today's PHQ-2 Score:       9/21/2023     8:20 AM   PHQ-2 ( 1999 [employer])   Q1: Little interest or pleasure in doing things 0   Q2: Feeling down, depressed or hopeless 0   PHQ-2 Score 0   Q1: Little interest or pleasure in doing things Not at all   Q2: Feeling down, depressed or hopeless Not at all   PHQ-2 Score 0           Social History     Tobacco Use    Smoking status: Never    Smokeless tobacco: Never   Substance Use Topics    Alcohol use: Not Currently     Alcohol/week: 0.0 - 21.0 standard drinks of alcohol     Comment: social             9/14/2023     3:11 PM   Alcohol Use   Prescreen: >3 drinks/day or >7 drinks/week? No     Reviewed orders with patient.  Reviewed health maintenance and updated orders accordingly - Yes    Breast Cancer Screening:    FHS-7:       7/2/2021     5:04 PM 7/21/2021     4:00 PM 8/30/2022     3:19 PM 9/20/2023     3:47 PM   Breast CA Risk Assessment (FHS-7)   Did any of your first-degree relatives have breast or ovarian cancer? Yes No Yes Yes   Did any of your relatives have bilateral breast cancer? No No No No   Did any man in your family have breast cancer? No No No No   Did any woman in your family have breast and ovarian cancer? Yes No No No    Did any woman in your family have breast cancer before age 50 y? No No No No   Do you have 2 or more relatives with breast and/or ovarian cancer? No No Yes Yes   Do you have 2 or more relatives with breast and/or bowel cancer? No No No No       Mammogram Screening: Recommended annual mammography  Pertinent mammograms are reviewed under the imaging tab.    History of abnormal Pap smear: NO - age 30-65 PAP every 5 years with negative HPV co-testing recommended      Latest Ref Rng & Units 2/28/2019     3:59 PM 2/28/2019     2:50 PM 9/12/2014     9:38 AM   PAP / HPV   PAP    Negative for squamous intraepithelial lesion or malignancy  Electronically signed by April Baltazar CT (ASCP) on 9/19/2014 at  3:54 PM      PAP (Historical)   NIL     HPV 16 DNA NEG^Negative Negative      HPV 18 DNA NEG^Negative Negative      Other HR HPV NEG^Negative Negative        Reviewed and updated as needed this visit by clinical staff   Tobacco  Allergies  Meds              Reviewed and updated as needed this visit by Provider                     Review of Systems   Constitutional:  Negative for chills and fever.   HENT:  Negative for congestion, ear pain, hearing loss and sore throat.    Eyes:  Negative for pain and visual disturbance.   Respiratory:  Negative for cough and shortness of breath.    Cardiovascular:  Negative for chest pain, palpitations and peripheral edema.   Gastrointestinal:  Negative for abdominal pain, constipation, diarrhea, heartburn, hematochezia and nausea.   Breasts:  Negative for tenderness, breast mass and discharge.   Genitourinary:  Negative for dysuria, frequency, genital sores, hematuria, pelvic pain, urgency, vaginal bleeding and vaginal discharge.   Musculoskeletal:  Negative for arthralgias, joint swelling and myalgias.   Skin:  Negative for rash.   Neurological:  Negative for dizziness, weakness, headaches and paresthesias.   Psychiatric/Behavioral:  Negative for mood changes. The patient is  "nervous/anxious.      OBJECTIVE:   /87 (BP Location: Right arm, Patient Position: Sitting, Cuff Size: Adult Regular)   Pulse 103   Temp 97.3  F (36.3  C) (Tympanic)   Resp 20   Ht 1.74 m (5' 8.5\")   Wt 96.8 kg (213 lb 4.8 oz)   SpO2 98%   BMI 31.96 kg/m    Physical Exam  GENERAL: healthy, alert and no distress  EYES: Eyes grossly normal to inspection, and conjunctivae and sclerae normal  HENT: ear canals and TM's normal  NECK: no adenopathy, no asymmetry, masses, or scars and thyroid normal to palpation  RESP: lungs clear to auscultation - no rales, rhonchi or wheezes  CV: regular rate and rhythm, normal S1 S2, no S3 or S4, no murmur, click or rub, no peripheral edema and peripheral pulses strong  ABDOMEN: soft, nondistended   (female): normal female external genitalia, normal urethral meatus, vaginal mucosa pink, moist, well rugated, and normal cervix; IUD (intrauterine device) string visualized  MS: no gross musculoskeletal defects noted, no edema  SKIN: no suspicious lesions or rashes  NEURO: Normal strength and tone, mentation intact and speech normal  PSYCH: mentation appears normal, affect normal/bright    ASSESSMENT/PLAN:       ICD-10-CM    1. Routine general medical examination at a health care facility  Z00.00       2. Screen for colon cancer  Z12.11 Colonoscopy Screening  Referral      3. Cervical cancer screening  Z12.4 Pap Screen with HPV - recommended age 30 - 65 years      4. Anxiety state  F41.1 buPROPion (WELLBUTRIN XL) 150 MG 24 hr tablet      5. Anxiety and depression  F41.9 buPROPion (WELLBUTRIN XL) 150 MG 24 hr tablet    F32.A       6. Cough, unspecified type  R05.9 albuterol (PROAIR HFA/PROVENTIL HFA/VENTOLIN HFA) 108 (90 Base) MCG/ACT inhaler      7. Morbid obesity (H)  E66.01       8. Screening for diabetes mellitus  Z13.1 Comprehensive metabolic panel (BMP + Alb, Alk Phos, ALT, AST, Total. Bili, TP)     Hemoglobin A1c      9. Screening cholesterol level  Z13.220 Lipid " "panel reflex to direct LDL Fasting      10. Screening for thyroid disorder  Z13.29 TSH with free T4 reflex      11. Encounter for vitamin deficiency screening  Z13.21 Vitamin D Deficiency      12. Screening, iron deficiency anemia  Z13.0 CBC with platelets        7. Follows with weight management clinic. On wegovy, metformin, phentermine.    Consider switch to ARB for blood pressure control if elevated.      COUNSELING:  Reviewed preventive health counseling, as reflected in patient instructions      BMI:   Estimated body mass index is 31.96 kg/m  as calculated from the following:    Height as of this encounter: 1.74 m (5' 8.5\").    Weight as of this encounter: 96.8 kg (213 lb 4.8 oz).         She reports that she has never smoked. She has never used smokeless tobacco.          Fransisca Sanon MD  [facility]  "

## 2023-09-21 NOTE — PATIENT INSTRUCTIONS
Preventive Health Recommendations  Female Ages 40 to 49    Yearly exam:   See your health care provider every year in order to  Review health changes.   Discuss preventive care.    Review your medicines if your doctor prescribed any.    Get a Pap test every three years (unless you have an abnormal result and your provider advises testing more often).    If you get Pap tests with HPV test, you only need to test every 5 years, unless you have an abnormal result. You do not need a Pap test if your uterus was removed (hysterectomy) and you have not had cancer.    You should be tested each year for STDs (sexually transmitted diseases), if you're at risk.   Ask your doctor if you should have a mammogram.    Have a colonoscopy (test for colon cancer) if someone in your family has had colon cancer or polyps before age 50.     Have a cholesterol test every 5 years.     Have a diabetes test (fasting glucose) after age 45. If you are at risk for diabetes, you should have this test every 3 years.    Shots: Get a flu shot each year. Get a tetanus shot every 10 years.     Nutrition:   Eat at least 5 servings of fruits and vegetables each day.  Eat whole-grain bread, whole-wheat pasta and brown rice instead of white grains and rice.  Get adequate Calcium and Vitamin D.      Lifestyle  Exercise at least 150 minutes a week (an average of 30 minutes a day, 5 days a week). This will help you control your weight and prevent disease.  Limit alcohol to one drink per day.  No smoking.   Wear sunscreen to prevent skin cancer.  See your dentist every six months for an exam and cleaning.  Preventive Health Recommendations  Female Ages 40 to 49    Yearly exam:   See your health care provider every year in order to  Review health changes.   Discuss preventive care.    Review your medicines if your doctor prescribed any.    Get a Pap test every three years (unless you have an abnormal result and your provider advises testing more often).    If  you get Pap tests with HPV test, you only need to test every 5 years, unless you have an abnormal result. You do not need a Pap test if your uterus was removed (hysterectomy) and you have not had cancer.    You should be tested each year for STDs (sexually transmitted diseases), if you're at risk.   Ask your doctor if you should have a mammogram.    Have a colonoscopy (test for colon cancer) if someone in your family has had colon cancer or polyps before age 50.     Have a cholesterol test every 5 years.     Have a diabetes test (fasting glucose) after age 45. If you are at risk for diabetes, you should have this test every 3 years.    Shots: Get a flu shot each year. Get a tetanus shot every 10 years.     Nutrition:   Eat at least 5 servings of fruits and vegetables each day.  Eat whole-grain bread, whole-wheat pasta and brown rice instead of white grains and rice.  Get adequate Calcium and Vitamin D.      Lifestyle  Exercise at least 150 minutes a week (an average of 30 minutes a day, 5 days a week). This will help you control your weight and prevent disease.  Limit alcohol to one drink per day.  No smoking.   Wear sunscreen to prevent skin cancer.  See your dentist every six months for an exam and cleaning.

## 2023-09-21 NOTE — COMMUNITY RESOURCES LIST (ENGLISH)
09/21/2023   [facility]  N/A  For questions about this resource list or additional care needs, please contact your primary care clinic or care manager.  Phone: [phone]   Email: N/A   Address: [address] [place] [address]   Hours: N/A        Food and Nutrition       Food pantry  1  [employer] [facility] [employer] Distance: 3.12 miles      Delivery, Pickup   [address] [place] [address]  Language: English  Hours: Mon - Wed 10:00 AM - 3:00 PM , Tue 5:30 PM - 7:30 PM , Thu 5:30 PM - 7:30 PM , Thu - Fri 10:00 AM - 12:00 PM  Fees: Free   Phone: (345) 613-5641 Website: http://www.[employer].org     2  [facility] Distance: 3.71 miles      In-Person   [address] [place] [address]  Language: English  Hours: Tue 10:00 AM - 4:00 PM , Thu 10:00 AM - 4:00 PM  Fees: Free   Phone: (370) 350-5539 Email: communications@[employer] Website: http://www.[employer] [place]org     SNAP application assistance  3  [address] [place] [address] Distance: 4.99 miles      In-Person   [address] [place] [address]  Language: English  Hours: Mon 8:00 AM - 4:00 PM , Tue 8:00 AM - 7:00 PM , Wed - Thu 8:00 AM - 4:00 PM  Fees: Free   Phone: (929) 350-2258 Email: info@[facility] Center[facility] Shorts.org Website: https://Be[employer] Intimates.org/resources/resource-centers/     4  [facility] Distance: 5.26 miles      In-Person   [address], [place] [address]  Language: English, Guatemalan  Hours: Mon - Fri 8:00 AM - 8:00 PM  Fees: Free   Phone: (602) 527-4964 Email: info@capagenScreenmailer.org Website: http://www.capagency.org     Soup kitchen or free meals  5  [facility] [place] [facility] [employer] Distance: 1.73 miles      Pickup   [address] [place] [address]  Language: English, Guatemalan  Hours: Mon - Thu 5:30 PM - 6:30 PM  Fees: Free   Phone: (499) 164-2171 Email:  oliva@[employer] Website: http://Tilera/wordpress/?page_id=5168     6  [facility] Distance: 6.06 miles      In-Person, Pickup   [address] [place] [address]  Language: English  Hours: Mon - Thu 5:00 PM - 6:30 PM  Fees: Free   Phone: (288) 548-5942 Email: office@[employer] Website: http://[employer]/          Important Numbers & Websites       Emergency Services   911  [facility]   311  Poison Control   (118) 356-9563  Suicide Prevention Lifeline   (833) 354-3590 (TALK)  Child Abuse Hotline   (132) 441-6514 (4-A-Child)  Sexual Assault Hotline   (602) 559-6977 (HOPE)  National Runaway Safeline   (119) 280-3629 (RUNAWAY)  All-Options Talkline   (843) 409-2834  Substance Abuse Referral   (993) 774-6727 (HELP)

## 2023-09-21 NOTE — COMMUNITY RESOURCES LIST (ENGLISH)
09/21/2023   [facility]  N/A  For questions about this resource list or additional care needs, please contact your primary care clinic or care manager.  Phone: [phone]   Email: N/A   Address: [address] [place] [address]   Hours: N/A        Food and Nutrition       Food pantry  1  [facility] Distance: 3.12 miles      Delivery, Pickup   [address] [place] [address]  Language: English  Hours: Mon - Wed 10:00 AM - 3:00 PM , Tue 5:30 PM - 7:30 PM , Thu 5:30 PM - 7:30 PM , Thu - Fri 10:00 AM - 12:00 PM  Fees: Free   Phone: (112) 662-4165 Website: http://www.TouristEye.org     2  [facility] Distance: 3.71 miles      In-Person   [address] [place] [address]  Language: English  Hours: Tue 10:00 AM - 4:00 PM , Thu 10:00 AM - 4:00 PM  Fees: Free   Phone: (639) 354-8373 Email: communications@[employer] Website: http://www.Netevenorg     SNAP application assistance  3  [address] [place] [address] Distance: 4.99 miles      In-Person   [address] [place] [address]  Language: English  Hours: Mon 8:00 AM - 4:00 PM , Tue 8:00 AM - 7:00 PM , Wed - Thu 8:00 AM - 4:00 PM  Fees: Free   Phone: (465) 938-3475 Email: info@[facility].org Website: https://Bukupe.org/resources/resource-centers/     4  [facility] (CAP) [facility] Distance: 5.26 miles      In-Person   [address], [place] [address]  Language: English, [place] citizen  Hours: Mon - Fri 8:00 AM - 8:00 PM  Fees: Free   Phone: (507) 758-1618 Email: info@[employer]Armorize Technologies.org Website: http://www.capagency.org     Soup kitchen or free meals  5  [facility] Distance: 1.73 miles      Pickup   [address] [place] [address]  Language: English, [place] citizen  Hours: Mon - Thu 5:30 PM - 6:30 PM  Fees: Free   Phone: (623) 661-7787 Email:  oliva@[employer] Website: http://YuMingle/wordpress/?page_id=5168     6  [facility] Distance: 6.06 miles      In-Person, Pickup   [address] [place] [address]  Language: English  Hours: Mon - Thu 5:00 PM - 6:30 PM  Fees: Free   Phone: (541) 764-6765 Email: office@[employer] Website: http://SpectraRep/          Important Numbers & Websites       Emergency Services   911  [facility]   311  Poison Control   (153) 844-2381  Suicide Prevention Lifeline   (109) 366-7887 (TALK)  Child Abuse Hotline   (821) 177-9732 (4-A-Child)  Sexual Assault Hotline   (603) 106-7286 (HOPE)  National Runaway Safeline   (725) 510-4500 (RUNAWAY)  All-Options Talkline   (315) 735-2610  Substance Abuse Referral   (579) 545-5773 (HELP)

## 2023-09-22 LAB — DEPRECATED CALCIDIOL+CALCIFEROL SERPL-MC: 23 UG/L (ref 20–75)

## 2023-09-25 LAB
BKR LAB AP GYN ADEQUACY: NORMAL
BKR LAB AP GYN INTERPRETATION: NORMAL
BKR LAB AP HPV REFLEX: NORMAL
BKR LAB AP PREVIOUS ABNORMAL: NORMAL
PATH REPORT.COMMENTS IMP SPEC: NORMAL
PATH REPORT.COMMENTS IMP SPEC: NORMAL
PATH REPORT.RELEVANT HX SPEC: NORMAL

## 2023-09-27 LAB
HUMAN PAPILLOMA VIRUS 16 DNA: NEGATIVE
HUMAN PAPILLOMA VIRUS 18 DNA: NEGATIVE
HUMAN PAPILLOMA VIRUS FINAL DIAGNOSIS: NORMAL
HUMAN PAPILLOMA VIRUS OTHER HR: NEGATIVE

## 2024-03-12 ENCOUNTER — E-VISIT (OUTPATIENT)
Dept: PEDIATRICS | Facility: CLINIC | Age: 49
End: 2024-03-12
Payer: COMMERCIAL

## 2024-03-12 DIAGNOSIS — I10 BENIGN ESSENTIAL HYPERTENSION: Primary | ICD-10-CM

## 2024-03-12 PROCEDURE — 99421 OL DIG E/M SVC 5-10 MIN: CPT | Performed by: STUDENT IN AN ORGANIZED HEALTH CARE EDUCATION/TRAINING PROGRAM

## 2024-03-12 RX ORDER — LOSARTAN POTASSIUM 50 MG/1
50 TABLET ORAL DAILY
Qty: 90 TABLET | Refills: 4 | Status: SHIPPED | OUTPATIENT
Start: 2024-03-12 | End: 2024-04-25

## 2024-03-12 NOTE — PATIENT INSTRUCTIONS
Thank you for choosing us for your care. I have placed an order for a prescription so that you can start treatment. View your full visit summary for details by clicking on the link below. Your pharmacist will able to address any questions you may have about the medication.     If you're not feeling better within 5-7 days, please schedule an appointment.  You can schedule an appointment right here in [facility], or call [phone]  If the visit is for the same symptoms as your eVisit, we'll refund the cost of your eVisit if seen within seven days.

## 2024-03-17 ENCOUNTER — HOSPITAL ENCOUNTER (EMERGENCY)
Facility: CLINIC | Age: 49
Discharge: HOME | End: 2024-03-17
Attending: EMERGENCY MEDICINE | Admitting: EMERGENCY MEDICINE
Payer: COMMERCIAL

## 2024-03-17 ENCOUNTER — APPOINTMENT (OUTPATIENT)
Dept: CT IMAGING | Facility: CLINIC | Age: 49
End: 2024-03-17
Attending: EMERGENCY MEDICINE
Payer: COMMERCIAL

## 2024-03-17 VITALS
TEMPERATURE: 98.5 F | SYSTOLIC BLOOD PRESSURE: 142 MMHG | RESPIRATION RATE: 17 BRPM | BODY MASS INDEX: 29.62 KG/M2 | WEIGHT: 200 LBS | OXYGEN SATURATION: 99 % | HEIGHT: 69 IN | HEART RATE: 95 BPM | DIASTOLIC BLOOD PRESSURE: 88 MMHG

## 2024-03-17 DIAGNOSIS — J06.9 UPPER RESPIRATORY TRACT INFECTION, UNSPECIFIED TYPE: ICD-10-CM

## 2024-03-17 DIAGNOSIS — R07.89 CHEST TIGHTNESS: ICD-10-CM

## 2024-03-17 DIAGNOSIS — F41.9 ANXIETY: ICD-10-CM

## 2024-03-17 LAB
ANION GAP SERPL CALCULATED.3IONS-SCNC: 16 MMOL/L (ref 7–15)
APTT PPP: 29 SECONDS (ref 22–38)
ATRIAL RATE - MUSE: 133 BPM
BASOPHILS # BLD AUTO: 0 10E3/UL (ref 0–0.2)
BASOPHILS NFR BLD AUTO: 0 %
BUN SERPL-MCNC: 7.8 MG/DL (ref 6–20)
CALCIUM SERPL-MCNC: 9.5 MG/DL (ref 8.6–10)
CHLORIDE SERPL-SCNC: 101 MMOL/L (ref 98–107)
CREAT SERPL-MCNC: 0.8 MG/DL (ref 0.51–0.95)
D DIMER PPP FEU-MCNC: 0.82 UG/ML FEU (ref 0–0.5)
DEPRECATED HCO3 PLAS-SCNC: 20 MMOL/L (ref 22–29)
DIASTOLIC BLOOD PRESSURE - MUSE: NORMAL MMHG
EGFRCR SERPLBLD CKD-EPI 2021: 90 ML/MIN/1.73M2
EOSINOPHIL # BLD AUTO: 0.1 10E3/UL (ref 0–0.7)
EOSINOPHIL NFR BLD AUTO: 1 %
ERYTHROCYTE [DISTWIDTH] IN BLOOD BY AUTOMATED COUNT: 12.9 % (ref 10–15)
FLUAV RNA SPEC QL NAA+PROBE: NEGATIVE
FLUBV RNA RESP QL NAA+PROBE: NEGATIVE
GLUCOSE SERPL-MCNC: 124 MG/DL (ref 70–99)
HCT VFR BLD AUTO: 41.3 % (ref 35–47)
HGB BLD-MCNC: 14.5 G/DL (ref 11.7–15.7)
HOLD SPECIMEN: NORMAL
IMM GRANULOCYTES # BLD: 0 10E3/UL
IMM GRANULOCYTES NFR BLD: 0 %
INR PPP: 0.93 (ref 0.85–1.15)
INTERPRETATION ECG - MUSE: NORMAL
LYMPHOCYTES # BLD AUTO: 3.1 10E3/UL (ref 0.8–5.3)
LYMPHOCYTES NFR BLD AUTO: 50 %
MCH RBC QN AUTO: 29.8 PG (ref 26.5–33)
MCHC RBC AUTO-ENTMCNC: 35.1 G/DL (ref 31.5–36.5)
MCV RBC AUTO: 85 FL (ref 78–100)
MONOCYTES # BLD AUTO: 0.3 10E3/UL (ref 0–1.3)
MONOCYTES NFR BLD AUTO: 5 %
NEUTROPHILS # BLD AUTO: 2.7 10E3/UL (ref 1.6–8.3)
NEUTROPHILS NFR BLD AUTO: 43 %
NRBC # BLD AUTO: 0 10E3/UL
NRBC BLD AUTO-RTO: 0 /100
P AXIS - MUSE: 65 DEGREES
PLATELET # BLD AUTO: 260 10E3/UL (ref 150–450)
POTASSIUM SERPL-SCNC: 3.3 MMOL/L (ref 3.4–5.3)
PR INTERVAL - MUSE: 122 MS
QRS DURATION - MUSE: 82 MS
QT - MUSE: 306 MS
QTC - MUSE: 455 MS
R AXIS - MUSE: 29 DEGREES
RBC # BLD AUTO: 4.87 10E6/UL (ref 3.8–5.2)
RSV RNA SPEC NAA+PROBE: NEGATIVE
SARS-COV-2 RNA RESP QL NAA+PROBE: NEGATIVE
SODIUM SERPL-SCNC: 137 MMOL/L (ref 135–145)
SYSTOLIC BLOOD PRESSURE - MUSE: NORMAL MMHG
T AXIS - MUSE: 61 DEGREES
TROPONIN T SERPL HS-MCNC: <6 NG/L
TSH SERPL DL<=0.005 MIU/L-ACNC: 0.6 UIU/ML (ref 0.3–4.2)
VENTRICULAR RATE- MUSE: 133 BPM
WBC # BLD AUTO: 6.3 10E3/UL (ref 4–11)

## 2024-03-17 PROCEDURE — 85025 COMPLETE CBC W/AUTO DIFF WBC: CPT | Performed by: EMERGENCY MEDICINE

## 2024-03-17 PROCEDURE — 36415 COLL VENOUS BLD VENIPUNCTURE: CPT | Performed by: EMERGENCY MEDICINE

## 2024-03-17 PROCEDURE — 99285 EMERGENCY DEPT VISIT HI MDM: CPT | Mod: 25

## 2024-03-17 PROCEDURE — 80048 BASIC METABOLIC PNL TOTAL CA: CPT | Performed by: EMERGENCY MEDICINE

## 2024-03-17 PROCEDURE — 85610 PROTHROMBIN TIME: CPT | Performed by: EMERGENCY MEDICINE

## 2024-03-17 PROCEDURE — 71275 CT ANGIOGRAPHY CHEST: CPT

## 2024-03-17 PROCEDURE — 250N000011 HC RX IP 250 OP 636: Performed by: EMERGENCY MEDICINE

## 2024-03-17 PROCEDURE — 96374 THER/PROPH/DIAG INJ IV PUSH: CPT | Mod: 59

## 2024-03-17 PROCEDURE — 84484 ASSAY OF TROPONIN QUANT: CPT | Performed by: EMERGENCY MEDICINE

## 2024-03-17 PROCEDURE — 87637 SARSCOV2&INF A&B&RSV AMP PRB: CPT | Performed by: EMERGENCY MEDICINE

## 2024-03-17 PROCEDURE — 85730 THROMBOPLASTIN TIME PARTIAL: CPT | Performed by: EMERGENCY MEDICINE

## 2024-03-17 PROCEDURE — 85379 FIBRIN DEGRADATION QUANT: CPT | Performed by: EMERGENCY MEDICINE

## 2024-03-17 PROCEDURE — 93005 ELECTROCARDIOGRAM TRACING: CPT | Performed by: EMERGENCY MEDICINE

## 2024-03-17 PROCEDURE — 84443 ASSAY THYROID STIM HORMONE: CPT | Performed by: EMERGENCY MEDICINE

## 2024-03-17 RX ORDER — LORAZEPAM 2 MG/ML
1 INJECTION INTRAMUSCULAR ONCE
Status: COMPLETED | OUTPATIENT
Start: 2024-03-17 | End: 2024-03-17

## 2024-03-17 RX ORDER — IOPAMIDOL 755 MG/ML
75 INJECTION, SOLUTION INTRAVASCULAR ONCE
Status: COMPLETED | OUTPATIENT
Start: 2024-03-17 | End: 2024-03-17

## 2024-03-17 RX ADMIN — IOPAMIDOL 75 ML: 755 INJECTION, SOLUTION INTRAVENOUS at 12:50

## 2024-03-17 RX ADMIN — LORAZEPAM 1 MG: 2 INJECTION INTRAMUSCULAR; INTRAVENOUS at 11:44

## 2024-03-17 ASSESSMENT — COLUMBIA-SUICIDE SEVERITY RATING SCALE - C-SSRS
1. IN THE PAST MONTH, HAVE YOU WISHED YOU WERE DEAD OR WISHED YOU COULD GO TO SLEEP AND NOT WAKE UP?: NO
2. HAVE YOU ACTUALLY HAD ANY THOUGHTS OF KILLING YOURSELF IN THE PAST MONTH?: NO
6. HAVE YOU EVER DONE ANYTHING, STARTED TO DO ANYTHING, OR PREPARED TO DO ANYTHING TO END YOUR LIFE?: NO

## 2024-03-17 ASSESSMENT — ACTIVITIES OF DAILY LIVING (ADL)
ADLS_ACUITY_SCORE: 35

## 2024-03-17 NOTE — DISCHARGE INSTRUCTIONS
Fortunately, all of the workup is negative.  There are no signs of a blood clot in your lungs, no pneumonia, no signs of any heart problems with completely normal lab work.  After giving some anxiety medicine your heart rate and blood pressure are both in a very acceptable reasonable range.  I recommend you make an appointment to follow-up with your primary care doctor but fortunately we did not find anything life-threatening today.

## 2024-03-17 NOTE — ED PROVIDER NOTES
EMERGENCY DEPARTMENT ENCOUNTER     NAME: Cristina Eaton   AGE: 49 year old female   YOB: 1975   MRN: [id number]   EVALUATION DATE & TIME: 3/17/2024 11:21 AM   PCP: Fransisca Sanon     Chief Complaint   Patient presents with    Chest Pain     Lightheaded     :    FINAL IMPRESSION       1. Chest tightness    2. Upper respiratory tract infection, unspecified type    3. Anxiety           ED COURSE & MEDICAL DECISION MAKING      11:24 AM I met the patient and perform my initial exam.  1:35 PM I updated the patient and discuss discharge plans.   Pertinent Labs & Imaging studies reviewed. (See chart for details)   49 year old female  presents to the Emergency Department for evaluation of 1 week of URI symptoms, noting that she has had some slight chest tightness with it the entire week, then today feeling very anxious and each time she checked her blood pressure it got higher which only made her anxious again. Initial Vitals Reviewed. Initial exam notable for patient who was extremely anxious appearing to the point where she was tearful, tachycardic and hypertensive.  On chart review, she does have a history of anxiety in the chart and looks to be in some pretty significant distress on arrival.  I actually treated with IV Ativan and after that she has a generally normal blood pressure, normal heart rate, and is calm and feeling significantly improved.  I did do a workup to rule out things like ACS, PE, thyroid dysfunction, anemia, electrolyte abnormalities, arrhythmia.  Labs are all unremarkable except D-dimer was mildly elevated.  I did get a CT PE study which is completely negative for PE or pneumonia.  With a week of ongoing symptoms and a completely negative troponin that sounds very noncardiac, patient is quite reassured and comfortable with discharge..           At the conclusion of the encounter I discussed the results of all of the tests and the disposition. The questions were answered. The  patient or family acknowledged understanding and was agreeable with the care plan.     0 minutes critical care time, see procedure note below for details if relevant    Medical Decision Making    History:  Supplemental history from: Documented in chart, spouse  External Record(s) reviewed: Documented in chart and Outpatient Record: Patient was seen at [facility] clinic in [place] on 9/21/24.     Work Up:  Chart documentation includes differential considered and any EKGs or imaging independently interpreted by provider, where specified.  In additional to work up documented, I considered the following work up: Documented in chart, if applicable.    External consultation:  Discussion of management with another provider: Documented in chart, if applicable    Complicating factors:  Care impacted by chronic illness: Mental Health and Other: Asthma and IBS.  Care affected by social determinants of health: N/A    Disposition considerations: Discharge. No recommendations on prescription strength medication(s). I considered admission, but ultimately discharged patient with reassuring workup.        MEDICATIONS GIVEN IN THE EMERGENCY:   Medications   LORazepam (ATIVAN) injection 1 mg (1 mg Intravenous $Given 3/17/24 1144)   iopamidol (ISOVUE-370) solution 75 mL (75 mLs Intravenous $Given 3/17/24 1250)      NEW PRESCRIPTIONS STARTED AT TODAY'S ER VISIT   New Prescriptions    No medications on file     ================================================================   HISTORY OF PRESENT ILLNESS       Patient information was obtained from: Patient      Use of Intrepreter: N/A     Cristina Eaton is a 49 year old female with history of Anxiety, asthma and IBS who presents to  ED for chest pain and SOB.     The patient reports chest tightness that has been ongoing since last week. She also has been endorsing a cough and URI for a week. The patient states she recently had her blood pressure medication switched  (on 03/14/24) and currently  "endorses High blood pressure. This morning, patient notes her left arm started feeling tingling and states \" I am freaking out right now\". The patient's dad  from an aneurysm at the age of 60 and she is concern it could happen to her.     ================================================================        PAST HISTORY     PAST MEDICAL HISTORY:   Past Medical History:   Diagnosis Date    Anxiety     Asthma     Gastroesophageal reflux disease without esophagitis     Knee pain     Menorrhagia with regular cycle 2021    Obesity       PAST SURGICAL HISTORY:   Past Surgical History:   Procedure Laterality Date    LAPAROSCOPIC APPENDECTOMY      Ruptured, did require inpatient IV Abx for several days    TONSILLECTOMY      WISDOM TOOTH EXTRACTION        CURRENT MEDICATIONS:   albuterol (PROAIR HFA/PROVENTIL HFA/VENTOLIN HFA) 108 (90 Base) MCG/ACT inhaler  buPROPion (WELLBUTRIN XL) 150 MG 24 hr tablet  insulin pen needle (BD ROSSANA U/F) 32G X 4 MM miscellaneous  levonorgestrel (MIRENA) 20 MCG/24HR IUD  losartan (COZAAR) 50 MG tablet  metFORMIN (GLUCOPHAGE XR) 500 MG 24 hr tablet  metoprolol succinate ER (TOPROL XL) 25 MG 24 hr tablet  phentermine (ADIPEX-P) 37.5 MG tablet  Semaglutide-Weight Management (WEGOVY) 1.7 MG/0.75ML pen  Semaglutide-Weight Management (WEGOVY) 2.4 MG/0.75ML pen      ALLERGIES:   No Known Allergies   FAMILY HISTORY:   Family History   Problem Relation Age of Onset    No Known Problems Mother     Obesity Father     Hypertension Father     Anuerysm Father         a fib, aortic anuerysm    Tremor Sister     No Known Problems Sister     No Known Problems Brother     No Known Problems Maternal Grandmother     Endometrial Cancer Maternal Grandfather     No Known Problems Paternal Grandmother     No Known Problems Paternal Grandfather     No Known Problems Daughter     Breast Cancer Maternal Aunt     Breast Cancer Maternal Aunt 38    No Known Problems Paternal Aunt     Hereditary Breast " and Ovarian Cancer Syndrome No family hx of     Cancer No family hx of     Colon Cancer No family hx of     Ovarian Cancer No family hx of       SOCIAL HISTORY:   Social History     Socioeconomic History    Marital status:    Tobacco Use    Smoking status: Never    Smokeless tobacco: Never   Vaping Use    Vaping Use: Never used   Substance and Sexual Activity    Alcohol use: Not Currently     Alcohol/week: 0.0 - 21.0 standard drinks of alcohol     Comment: social    Drug use: No    Sexual activity: Yes     Partners: Male     Birth control/protection: None, I.U.D.     Comment: Mirena 10/6/2021   Social History Narrative    . One child 13 yo. Working FT for [employer].  works for [employer]     Social Determinants of Health     Financial Resource Strain: Low Risk  (9/21/2023)    Financial Resource Strain     Within the past 12 months, have you or your family members you live with been unable to get utilities (heat, electricity) when it was really needed?: No   Recent Concern: Financial Resource Strain - Medium Risk (9/14/2023)    Overall Financial Resource Strain (CARDIA)     Difficulty of Paying Living Expenses: Somewhat hard   Food Insecurity: High Risk (9/21/2023)    Food Insecurity     Within the past 12 months, did you worry that your food would run out before you got money to buy more?: Yes     Within the past 12 months, did the food you bought just not last and you didn t have money to get more?: Yes   Transportation Needs: Low Risk  (9/21/2023)    Transportation Needs     Within the past 12 months, has lack of transportation kept you from medical appointments, getting your medicines, non-medical meetings or appointments, work, or from getting things that you need?: No   Physical Activity: Insufficiently Active (9/14/2023)    Exercise Vital Sign     Days of Exercise per Week: 1 day     Minutes of Exercise per Session: 20 min   Stress: Stress Concern Present (9/14/2023)    Citizen of [place]  "of Occupational Health - Occupational Stress Questionnaire     Feeling of Stress : Rather much   Social Connections: Socially Integrated (9/14/2023)    Social Connection and Isolation Panel [NHANES]     Frequency of Communication with Friends and Family: Three times a week     Frequency of Social Gatherings with Friends and Family: Once a week     Attends Gnosticism Services: More than 4 times per year     Active Member of Clubs or Organizations: Yes     Marital Status:    Interpersonal Safety: Low Risk  (9/21/2023)    Interpersonal Safety     Do you feel physically and emotionally safe where you currently live?: Yes     Within the past 12 months, have you been hit, slapped, kicked or otherwise physically hurt by someone?: No     Within the past 12 months, have you been humiliated or emotionally abused in other ways by your partner or ex-partner?: No   Housing Stability: Low Risk  (9/21/2023)    Housing Stability     Do you have housing? : Yes     Are you worried about losing your housing?: No        VITALS  Patient Vitals for the past 24 hrs:   BP Temp Pulse Resp SpO2 Height Weight   03/17/24 1330 (!) 142/88 -- 95 17 99 % -- --   03/17/24 1326 (!) 143/88 -- 98 17 100 % -- --   03/17/24 1230 (!) 144/84 -- 100 14 -- -- --   03/17/24 1215 (!) 144/83 -- 99 13 -- -- --   03/17/24 1159 (!) 156/93 -- 98 -- -- -- --   03/17/24 1148 (!) 157/95 -- 108 18 99 % -- --   03/17/24 1122 -- -- -- -- -- 1.753 m (5' 9\") 90.7 kg (200 lb)   03/17/24 1120 (!) 178/111 98.5  F (36.9  C) (!) 134 22 100 % -- --        ================================================================    PHYSICAL EXAM     VITAL SIGNS: BP (!) 142/88   Pulse 95   Temp 98.5  F (36.9  C)   Resp 17   Ht 1.753 m (5' 9\")   Wt 90.7 kg (200 lb)   SpO2 99%   BMI 29.53 kg/m     Constitutional:  Awake, anxious appearing and tearful.   HENT:  Atraumatic, oropharynx without exudate or erythema, membranes moist  Lymph:  No adenopathy  Eyes: EOM intact, PERRL, no " injection  Neck: Supple  Respiratory:  Clear to auscultation bilaterally, no wheezes or crackles   Cardiovascular: Tachycardic, single S1 and S2   GI:  Soft, nontender, nondistended, no rebound or guarding   Musculoskeletal:  Moves all extremities, no lower extremity edema, no deformities    Skin:  Warm, dry  Neurologic:  Alert and oriented x3, no focal deficits noted       ================================================================  LAB       All pertinent labs reviewed and interpreted.   Labs Ordered and Resulted from Time of ED Arrival to Time of ED Departure   D DIMER QUANTITATIVE - Abnormal       Result Value    D-Dimer Quantitative 0.82 (*)    BASIC METABOLIC PANEL - Abnormal    Sodium 137      Potassium 3.3 (*)     Chloride 101      Carbon Dioxide (CO2) 20 (*)     Anion Gap 16 (*)     Urea Nitrogen 7.8      Creatinine 0.80      GFR Estimate 90      Calcium 9.5      Glucose 124 (*)    INR - Normal    INR 0.93     PARTIAL THROMBOPLASTIN TIME - Normal    aPTT 29     TROPONIN T, HIGH SENSITIVITY - Normal    Troponin T, High Sensitivity <6     TSH WITH FREE T4 REFLEX - Normal    TSH 0.60     INFLUENZA A/B, RSV, & SARS-COV2 PCR - Normal    Influenza A PCR Negative      Influenza B PCR Negative      RSV PCR Negative      SARS CoV2 PCR Negative     CBC WITH PLATELETS AND DIFFERENTIAL    WBC Count 6.3      RBC Count 4.87      Hemoglobin 14.5      Hematocrit 41.3      MCV 85      MCH 29.8      MCHC 35.1      RDW 12.9      Platelet Count 260      % Neutrophils 43      % Lymphocytes 50      % Monocytes 5      % Eosinophils 1      % Basophils 0      % Immature Granulocytes 0      NRBCs per 100 WBC 0      Absolute Neutrophils 2.7      Absolute Lymphocytes 3.1      Absolute Monocytes 0.3      Absolute Eosinophils 0.1      Absolute Basophils 0.0      Absolute Immature Granulocytes 0.0      Absolute NRBCs 0.0          ===============================================================  RADIOLOGY       Reviewed all pertinent  imaging. Please see official radiology report.   CT Chest Pulmonary Embolism w Contrast   Final Result   IMPRESSION:   1.  No pulmonary embolism or other acute cardiopulmonary abnormality.   2.  Possible left ventricular hypertrophy. Recommend nonemergent evaluation with echocardiogram.            ================================================================  EKG     EKG reviewed interpreted by me shows sinus tachycardia with a rate of 133, normal axis, QTc 455 with no acute ST or T wave changes since October 2018 when there was also some slight sinus tachycardia    I have independently reviewed and interpreted the EKG(s) documented above.     ================================================================  PROCEDURES         I, Silke Edouard, am serving as a scribe to document services personally performed by Dr. Cordon based on my observation and the provider's statements to me. I, Katie Cordon MD attest that Silke Edouard  is acting in a scribe capacity, has observed my performance of the services and has documented them in accordance with my direction.   Katie Cordon M.D.   Emergency Medicine   [facility] EMERGENCY ROOM  [address] [address]  [phone]  Dept: [phone]      Katie Cordon MD  03/17/24 5629

## 2024-03-17 NOTE — ED TRIAGE NOTES
Pt here with concern for chest tightness for last week, also has had a cough and URI for a week. Had BP meds switched on Thursday and today notes left arm feeling tingly. Pt states he father  from an aneurysm at age 60.

## 2024-03-18 ENCOUNTER — MYC MEDICAL ADVICE (OUTPATIENT)
Dept: PEDIATRICS | Facility: CLINIC | Age: 49
End: 2024-03-18
Payer: COMMERCIAL

## 2024-03-21 ENCOUNTER — OFFICE VISIT (OUTPATIENT)
Dept: PEDIATRICS | Facility: CLINIC | Age: 49
End: 2024-03-21
Payer: COMMERCIAL

## 2024-03-21 VITALS
TEMPERATURE: 97.2 F | WEIGHT: 204 LBS | BODY MASS INDEX: 30.21 KG/M2 | DIASTOLIC BLOOD PRESSURE: 86 MMHG | RESPIRATION RATE: 16 BRPM | OXYGEN SATURATION: 100 % | SYSTOLIC BLOOD PRESSURE: 132 MMHG | HEART RATE: 97 BPM | HEIGHT: 69 IN

## 2024-03-21 DIAGNOSIS — R51.9 DAILY HEADACHE: ICD-10-CM

## 2024-03-21 DIAGNOSIS — I10 BENIGN ESSENTIAL HYPERTENSION: Primary | ICD-10-CM

## 2024-03-21 DIAGNOSIS — F41.9 ANXIETY: ICD-10-CM

## 2024-03-21 DIAGNOSIS — I51.7 LEFT VENTRICULAR HYPERTROPHY: ICD-10-CM

## 2024-03-21 PROCEDURE — 99214 OFFICE O/P EST MOD 30 MIN: CPT | Performed by: PHYSICIAN ASSISTANT

## 2024-03-21 ASSESSMENT — ASTHMA QUESTIONNAIRES
QUESTION_5 LAST FOUR WEEKS HOW WOULD YOU RATE YOUR ASTHMA CONTROL: COMPLETELY CONTROLLED
QUESTION_4 LAST FOUR WEEKS HOW OFTEN HAVE YOU USED YOUR RESCUE INHALER OR NEBULIZER MEDICATION (SUCH AS ALBUTEROL): NOT AT ALL
QUESTION_1 LAST FOUR WEEKS HOW MUCH OF THE TIME DID YOUR ASTHMA KEEP YOU FROM GETTING AS MUCH DONE AT WORK, SCHOOL OR AT HOME: NONE OF THE TIME
QUESTION_3 LAST FOUR WEEKS HOW OFTEN DID YOUR ASTHMA SYMPTOMS (WHEEZING, COUGHING, SHORTNESS OF BREATH, CHEST TIGHTNESS OR PAIN) WAKE YOU UP AT NIGHT OR EARLIER THAN USUAL IN THE MORNING: NOT AT ALL
QUESTION_2 LAST FOUR WEEKS HOW OFTEN HAVE YOU HAD SHORTNESS OF BREATH: NOT AT ALL
ACT_TOTALSCORE: 25
ACT_TOTALSCORE: 25

## 2024-03-21 ASSESSMENT — PAIN SCALES - GENERAL: PAINLEVEL: NO PAIN (0)

## 2024-03-21 NOTE — PROGRESS NOTES
Assessment & Plan     Benign essential hypertension  Continue on Losartan 50mg without change right now.   Start checking BP next week to decide if dose adjustment is needed.   She does have lap visit for next week.     Daily headache  Recommend headache log to help with review.   PCP had discussed some medications could cause headaches.   She does report some improvement since stopping Metoprolol    Anxiety  Pt has lots of stress at work and is very nervous about bad things happening at home.   Discussed she would likely benefit from anxiety medication.   Pt not interested today. Consider discussing with PCP.   Also recommended establishing therapist, could start seeing Evette Neville here in the clinic. Pt not interested today.    Left ventricular hypertrophy  Found on Chest CT during ER visit.  Recommended Echocardiogram for further evaluation.  - Echocardiogram Complete        MED REC REQUIRED  Post Medication Reconciliation Status:  Patient was not discharged from an inpatient facility or TCU    FUTURE LABS:       - Patient has one scheduled for next week  FUTURE APPOINTMENTS:       - Follow-up with PCP in 1 month    Aniyah Evans is a 49 year old, presenting for the following health issues:  Hospital F/U        3/21/2024     1:26 PM   Additional Questions   Roomed by Harriet CARRASQUILLO   Accompanied by Self         3/21/2024     1:26 PM   Patient Reported Additional Medications   Patient reports taking the following new medications n/a     HPI       ED/UC Followup:    Facility:  [facility]   Date of visit: 3/17/24  Reason for visit: Chest tightness, high BP   Current Status: headache, arm tingling is gone      Patient states she was noticing her BP elevated at home. On 3/12 patient was switched from Metoprolol 25mg once daily to Losartan 50mg. She was complaining of some headaches. Pt states on 3/17 her BP was high and she started having chest tightness. She went to the ER and had full workup. Pt states she thinks  "she put herself into a panic attack about something being wrong. She states chest tightness has been gone. She has not been checking her BP since it was giving her anxiety. She is scheduled for lab visit next week since starting the medication. Patient states she does have daily anxiety and her job is stressful. She knows she gets stuck thinking about things that shouldn't stress her out. Pt is on Welbutrin but not on anxiety medication. Pt does not have a therapist.     CT Chest showed LVH and advised to complete Echo for further evaluation.     Review of Systems  Constitutional, HEENT, cardiovascular, pulmonary, gi and gu systems are negative, except as otherwise noted.      Objective    /86   Pulse 97   Temp 97.2  F (36.2  C) (Tympanic)   Resp 16   Ht 1.76 m (5' 9.29\")   Wt 92.5 kg (204 lb)   SpO2 100%   BMI 29.87 kg/m    Body mass index is 29.87 kg/m .    Physical Exam   GENERAL: alert and no distress  EYES: Eyes grossly normal to inspection, PERRL and conjunctivae and sclerae normal  NECK: no adenopathy, no asymmetry, masses, or scars  RESP: lungs clear to auscultation - no rales, rhonchi or wheezes  CV: regular rate and rhythm, normal S1 S2, no S3 or S4, no murmur, click or rub, no peripheral edema  MS: no gross musculoskeletal defects noted, no edema  PSYCH: mentation appears normal, affect normal/bright        Signed Electronically by: Diane Márquez PA-C    "

## 2024-03-22 ENCOUNTER — TELEPHONE (OUTPATIENT)
Dept: SURGERY | Facility: CLINIC | Age: 49
End: 2024-03-22
Payer: COMMERCIAL

## 2024-03-22 NOTE — TELEPHONE ENCOUNTER
Prior Authorization Retail Medication Request    Medication/Dose: Wegovy 2.4mg/0.75ml Auto-injectors  New/renewal/insurance change PA/secondary ins. PA: Renewal  Previously Tried and Failed:  Pt has been taking this medication with good results.     Current weight as of 3/21/8320=043 lb.  Starting weight os 7/6/2023=234.5 lb.    Key: [id number]    Pharmacy Information (if different than what is on RX)  Name:  [facility]  Phone:  [phone]  Fax:  [phone]

## 2024-03-26 ENCOUNTER — LAB (OUTPATIENT)
Dept: LAB | Facility: CLINIC | Age: 49
End: 2024-03-26
Payer: COMMERCIAL

## 2024-03-26 DIAGNOSIS — I10 BENIGN ESSENTIAL HYPERTENSION: ICD-10-CM

## 2024-03-26 PROCEDURE — 36415 COLL VENOUS BLD VENIPUNCTURE: CPT

## 2024-03-26 PROCEDURE — 80048 BASIC METABOLIC PNL TOTAL CA: CPT

## 2024-03-27 LAB
ANION GAP SERPL CALCULATED.3IONS-SCNC: 12 MMOL/L (ref 7–15)
BUN SERPL-MCNC: 14 MG/DL (ref 6–20)
CALCIUM SERPL-MCNC: 9.1 MG/DL (ref 8.6–10)
CHLORIDE SERPL-SCNC: 105 MMOL/L (ref 98–107)
CREAT SERPL-MCNC: 0.8 MG/DL (ref 0.51–0.95)
DEPRECATED HCO3 PLAS-SCNC: 25 MMOL/L (ref 22–29)
EGFRCR SERPLBLD CKD-EPI 2021: 90 ML/MIN/1.73M2
GLUCOSE SERPL-MCNC: 89 MG/DL (ref 70–99)
POTASSIUM SERPL-SCNC: 3.7 MMOL/L (ref 3.4–5.3)
SODIUM SERPL-SCNC: 142 MMOL/L (ref 135–145)

## 2024-04-03 NOTE — TELEPHONE ENCOUNTER
Retail Pharmacy Prior Authorization Team   Phone: [phone]    PA Initiation    Medication: WEGOVY 2.4 MG/0.75ML SC SOAJ  Insurance Company: [employer] [place] - Phone [phone] Fax [phone]  Pharmacy Filling the Rx: [id number] - [place], [place] - [address]  Filling Pharmacy Phone: [phone]  Filling Pharmacy Fax:    Start Date: 4/3/2024      EXTRA QS FAXED:         Note: Due to record-high volumes, our turn-around time is taking longer than usual . We are currently 10 business days behind in the pools.   We are working diligently to submit all requests in a timely manner and in the order they are received. Please only flag TRUE URGENT requests as high priority to the pool at this time.   If you have questions - please send a note/message in the active PA encounter and send back to the RPPA (Retail Pharmacy Prior Authorization) team [[id number]].    If you have more specific questions about our process please reach out to our supervisor Delphine Graham.   Thank you!

## 2024-04-08 NOTE — TELEPHONE ENCOUNTER
PRIOR AUTHORIZATION DENIED    Medication: WEGOVY 2.4 MG/0.75ML SC SOAJ  Insurance Company: [employer] [place] - Phone [phone] Fax [phone]  Denial Date: 4/6/2024  Denial Reason(s):             Appeal Information:         Patient Notified: No

## 2024-04-11 ENCOUNTER — HOSPITAL ENCOUNTER (OUTPATIENT)
Dept: CARDIOLOGY | Facility: CLINIC | Age: 49
Discharge: HOME | End: 2024-04-11
Attending: PHYSICIAN ASSISTANT | Admitting: PHYSICIAN ASSISTANT
Payer: COMMERCIAL

## 2024-04-11 DIAGNOSIS — I51.7 LEFT VENTRICULAR HYPERTROPHY: ICD-10-CM

## 2024-04-11 LAB — LVEF ECHO: NORMAL

## 2024-04-11 PROCEDURE — 93306 TTE W/DOPPLER COMPLETE: CPT | Mod: 26 | Performed by: INTERNAL MEDICINE

## 2024-04-11 PROCEDURE — 93306 TTE W/DOPPLER COMPLETE: CPT

## 2024-04-23 ASSESSMENT — ASTHMA QUESTIONNAIRES
QUESTION_2 LAST FOUR WEEKS HOW OFTEN HAVE YOU HAD SHORTNESS OF BREATH: NOT AT ALL
ACT_TOTALSCORE: 25
QUESTION_5 LAST FOUR WEEKS HOW WOULD YOU RATE YOUR ASTHMA CONTROL: COMPLETELY CONTROLLED
QUESTION_3 LAST FOUR WEEKS HOW OFTEN DID YOUR ASTHMA SYMPTOMS (WHEEZING, COUGHING, SHORTNESS OF BREATH, CHEST TIGHTNESS OR PAIN) WAKE YOU UP AT NIGHT OR EARLIER THAN USUAL IN THE MORNING: NOT AT ALL
QUESTION_4 LAST FOUR WEEKS HOW OFTEN HAVE YOU USED YOUR RESCUE INHALER OR NEBULIZER MEDICATION (SUCH AS ALBUTEROL): NOT AT ALL
ACT_TOTALSCORE: 25
QUESTION_1 LAST FOUR WEEKS HOW MUCH OF THE TIME DID YOUR ASTHMA KEEP YOU FROM GETTING AS MUCH DONE AT WORK, SCHOOL OR AT HOME: NONE OF THE TIME

## 2024-04-25 ENCOUNTER — MYC MEDICAL ADVICE (OUTPATIENT)
Dept: PEDIATRICS | Facility: CLINIC | Age: 49
End: 2024-04-25

## 2024-04-25 ENCOUNTER — OFFICE VISIT (OUTPATIENT)
Dept: PEDIATRICS | Facility: CLINIC | Age: 49
End: 2024-04-25
Payer: COMMERCIAL

## 2024-04-25 VITALS
HEIGHT: 69 IN | DIASTOLIC BLOOD PRESSURE: 99 MMHG | TEMPERATURE: 97.8 F | RESPIRATION RATE: 16 BRPM | HEART RATE: 109 BPM | OXYGEN SATURATION: 99 % | SYSTOLIC BLOOD PRESSURE: 133 MMHG | BODY MASS INDEX: 30.49 KG/M2 | WEIGHT: 205.9 LBS

## 2024-04-25 DIAGNOSIS — R06.83 SNORING: ICD-10-CM

## 2024-04-25 DIAGNOSIS — I10 BENIGN ESSENTIAL HYPERTENSION: Primary | ICD-10-CM

## 2024-04-25 PROCEDURE — 99214 OFFICE O/P EST MOD 30 MIN: CPT | Performed by: STUDENT IN AN ORGANIZED HEALTH CARE EDUCATION/TRAINING PROGRAM

## 2024-04-25 RX ORDER — LOSARTAN POTASSIUM 100 MG/1
100 TABLET ORAL DAILY
Qty: 90 TABLET | Refills: 4 | Status: SHIPPED | OUTPATIENT
Start: 2024-04-25

## 2024-04-25 NOTE — PROGRESS NOTES
"  Assessment & Plan     Benign essential hypertension  Diastolic hypertension. Recommend increase losartan (Cozaar) to 100mg and will MyChart message in 3 weeks. Consider add amlodipine 5mg if still elevated. Also recommended adding cardiovascular exercise to routine and getting sleep study to evaluate for obstructive sleep apnea.  Pending blood pressure may need to repeat BMP.  - Adult Sleep Eval & Management  Referral; Future  - losartan (COZAAR) 100 MG tablet; Take 1 tablet (100 mg) by mouth daily    Snoring  - Adult Sleep Eval & Management  Referral; Future            BMI  Estimated body mass index is 30.15 kg/m  as calculated from the following:    Height as of this encounter: 1.76 m (5' 9.29\").    Weight as of this encounter: 93.4 kg (205 lb 14.4 oz).             Aniyah Evans is a 49 year old, presenting for the following health issues:  Hypertension        4/25/2024     8:40 AM   Additional Questions   Roomed by Ghazal Muniz   Accompanied by N/A     Pt has cut out all alcohol and is following a low sodium diet     History of Present Illness       Hypertension: She presents for follow up of hypertension.  She does check blood pressure  regularly outside of the clinic. Outside blood pressures have been over 140/90. She follows a low salt diet.     She eats 2-3 servings of fruits and vegetables daily.She consumes 0 sweetened beverage(s) daily.She exercises with enough effort to increase her heart rate 10 to 19 minutes per day.  She exercises with enough effort to increase her heart rate 3 or less days per week.   She is taking medications regularly.     Headache in the morning  Does not feel well in the morning                    Objective    BP (!) 133/99 (BP Location: Right arm, Patient Position: Sitting, Cuff Size: Adult Large)   Pulse 109   Temp 97.8  F (36.6  C) (Temporal)   Resp 16   Ht 1.76 m (5' 9.29\")   Wt 93.4 kg (205 lb 14.4 oz)   LMP  (LMP Unknown)   SpO2 99%   BMI 30.15 " kg/m    Body mass index is 30.15 kg/m .  Physical Exam   GEN: Alert and appropriately interactive for age  EYES: Eyes grossly normal to inspection, conjunctivae and sclerae normal  RESP: Breathing comfortably on room air   CV: Warm and well perfused peripheral extremities   MS: no gross musculoskeletal defects noted, no edema  NEURO: Alert and oriented Gait normal. Speech fluent.    PSYCH: Affect normal/bright. Appearance well groomed.               Signed Electronically by: Deirdre E. Milligan, MD

## 2024-05-20 RX ORDER — AMLODIPINE BESYLATE 5 MG/1
5 TABLET ORAL DAILY
Qty: 90 TABLET | Refills: 4 | Status: SHIPPED | OUTPATIENT
Start: 2024-05-20

## 2024-05-20 NOTE — TELEPHONE ENCOUNTER
Added amlodipine 5mg.    Deirdre Milligan, MD  Internal Medicine & Pediatrics  [facility]  She/her
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
## 2024-05-28 NOTE — TELEPHONE ENCOUNTER
Retail Pharmacy Prior Authorization Team   Phone: [phone]    MEDICATION APPEAL APPROVED    Medication: WEGOVY 2.4 MG/0.75ML SC SOAJ  Authorization Effective Date:  04/28/2024   Authorization Expiration Date:  05/28/2025  Appeal Insurance Company: [employer] [place] - Phone [phone] Fax [phone]   Filling Pharmacy: [id number] - [place], [place] - [address] [place] [address]  Patient Notified: YES  Comments:

## 2024-06-06 ENCOUNTER — TELEPHONE (OUTPATIENT)
Dept: SURGERY | Facility: CLINIC | Age: 49
End: 2024-06-06
Payer: COMMERCIAL

## 2024-06-06 DIAGNOSIS — E66.9 OBESITY (BMI 30-39.9): Primary | ICD-10-CM

## 2024-06-06 NOTE — TELEPHONE ENCOUNTER
Appeal for medication on chart.  Patient will need to start back at initial doses though and titrate back up because it has been about 4 months since she was last on it.      Teresa Aguirre RN

## 2024-06-13 DIAGNOSIS — E66.811 CLASS 1 OBESITY WITH BODY MASS INDEX (BMI) OF 33.0 TO 33.9 IN ADULT, UNSPECIFIED OBESITY TYPE, UNSPECIFIED WHETHER SERIOUS COMORBIDITY PRESENT: ICD-10-CM

## 2024-06-13 DIAGNOSIS — E88.810 METABOLIC SYNDROME: ICD-10-CM

## 2024-06-13 DIAGNOSIS — E88.819 INSULIN RESISTANCE: ICD-10-CM

## 2024-06-13 RX ORDER — METFORMIN HCL 500 MG
500 TABLET, EXTENDED RELEASE 24 HR ORAL 2 TIMES DAILY WITH MEALS
Qty: 180 TABLET | Refills: 3 | Status: SHIPPED | OUTPATIENT
Start: 2024-06-13

## 2024-06-30 DIAGNOSIS — E66.9 OBESITY (BMI 30-39.9): ICD-10-CM

## 2024-07-01 ENCOUNTER — MYC MEDICAL ADVICE (OUTPATIENT)
Dept: SURGERY | Facility: CLINIC | Age: 49
End: 2024-07-01
Payer: COMMERCIAL

## 2024-07-01 DIAGNOSIS — E66.9 OBESITY (BMI 30-39.9): ICD-10-CM

## 2024-07-08 RX ORDER — SEMAGLUTIDE 0.5 MG/.5ML
INJECTION, SOLUTION SUBCUTANEOUS
Qty: 2 ML | Refills: 0 | OUTPATIENT
Start: 2024-07-08

## 2024-07-08 RX ORDER — SEMAGLUTIDE 1 MG/.5ML
INJECTION, SOLUTION SUBCUTANEOUS
Qty: 2 ML | Refills: 0 | OUTPATIENT
Start: 2024-07-08

## 2024-07-08 RX ORDER — SEMAGLUTIDE 0.25 MG/.5ML
INJECTION, SOLUTION SUBCUTANEOUS
Qty: 2 ML | Refills: 0 | OUTPATIENT
Start: 2024-07-08

## 2024-07-08 NOTE — TELEPHONE ENCOUNTER
Another refill request came through for the first three doses of the Wegovy from [facility].  We have an approval on file and wondering if the patient has been able to start.  Asked patient to call us back to clarify and also called the pharmacy to see if they have it in her profile and know about the approval.  The pharmacist said that it was in her chart from 6/6, but then cancelled for some reason so they would need it to be sent again and then they can fill it. Will try sending it again with the appropriate start dates for the titrating doses.    Teresa Aguirre RN

## 2024-10-01 SDOH — HEALTH STABILITY: PHYSICAL HEALTH: ON AVERAGE, HOW MANY DAYS PER WEEK DO YOU ENGAGE IN MODERATE TO STRENUOUS EXERCISE (LIKE A BRISK WALK)?: 1 DAY

## 2024-10-01 SDOH — HEALTH STABILITY: PHYSICAL HEALTH: ON AVERAGE, HOW MANY MINUTES DO YOU ENGAGE IN EXERCISE AT THIS LEVEL?: 20 MIN

## 2024-10-01 ASSESSMENT — SOCIAL DETERMINANTS OF HEALTH (SDOH): HOW OFTEN DO YOU GET TOGETHER WITH FRIENDS OR RELATIVES?: ONCE A WEEK

## 2024-10-03 ENCOUNTER — OFFICE VISIT (OUTPATIENT)
Dept: PEDIATRICS | Facility: CLINIC | Age: 49
End: 2024-10-03
Payer: COMMERCIAL

## 2024-10-03 VITALS
SYSTOLIC BLOOD PRESSURE: 100 MMHG | OXYGEN SATURATION: 98 % | HEART RATE: 92 BPM | WEIGHT: 203.5 LBS | TEMPERATURE: 98.2 F | BODY MASS INDEX: 30.14 KG/M2 | RESPIRATION RATE: 18 BRPM | HEIGHT: 69 IN | DIASTOLIC BLOOD PRESSURE: 66 MMHG

## 2024-10-03 DIAGNOSIS — Z12.31 VISIT FOR SCREENING MAMMOGRAM: ICD-10-CM

## 2024-10-03 DIAGNOSIS — Z00.00 ROUTINE GENERAL MEDICAL EXAMINATION AT A HEALTH CARE FACILITY: Primary | ICD-10-CM

## 2024-10-03 DIAGNOSIS — E66.01 MORBID OBESITY (H): ICD-10-CM

## 2024-10-03 DIAGNOSIS — Z12.11 SCREEN FOR COLON CANCER: ICD-10-CM

## 2024-10-03 PROCEDURE — 99396 PREV VISIT EST AGE 40-64: CPT | Performed by: STUDENT IN AN ORGANIZED HEALTH CARE EDUCATION/TRAINING PROGRAM

## 2024-10-03 ASSESSMENT — PAIN SCALES - GENERAL: PAINLEVEL: NO PAIN (0)

## 2024-10-03 NOTE — PATIENT INSTRUCTIONS
Patient Education   Preventive Care Advice   This is general advice given by our system to help you stay healthy. However, your care team may have specific advice just for you. Please talk to your care team about your preventive care needs.  Nutrition  Eat 5 or more servings of fruits and vegetables each day.  Try wheat bread, brown rice and whole grain pasta (instead of white bread, rice, and pasta).  Get enough calcium and vitamin D. Check the label on foods and aim for 100% of the RDA (recommended daily allowance).  Lifestyle  Exercise at least 150 minutes each week  (30 minutes a day, 5 days a week).  Do muscle strengthening activities 2 days a week. These help control your weight and prevent disease.  No smoking.  Wear sunscreen to prevent skin cancer.  Have a dental exam and cleaning every 6 months.  Yearly exams  See your health care team every year to talk about:  Any changes in your health.  Any medicines your care team has prescribed.  Preventive care, family planning, and ways to prevent chronic diseases.  Shots (vaccines)   HPV shots (up to age 26), if you've never had them before.  Hepatitis B shots (up to age 59), if you've never had them before.  COVID-19 shot: Get this shot when it's due.  Flu shot: Get a flu shot every year.  Tetanus shot: Get a tetanus shot every 10 years.  Pneumococcal, hepatitis A, and RSV shots: Ask your care team if you need these based on your risk.  Shingles shot (for age 50 and up)  General health tests  Diabetes screening:  Starting at age 35, Get screened for diabetes at least every 3 years.  If you are younger than age 35, ask your care team if you should be screened for diabetes.  Cholesterol test: At age 39, start having a cholesterol test every 5 years, or more often if advised.  Bone density scan (DEXA): At age 50, ask your care team if you should have this scan for osteoporosis (brittle bones).  Hepatitis C: Get tested at least once in your life.  STIs (sexually  transmitted infections)  Before age 24: Ask your care team if you should be screened for STIs.  After age 24: Get screened for STIs if you're at risk. You are at risk for STIs (including HIV) if:  You are sexually active with more than one person.  You don't use condoms every time.  You or a partner was diagnosed with a sexually transmitted infection.  If you are at risk for HIV, ask about PrEP medicine to prevent HIV.  Get tested for HIV at least once in your life, whether you are at risk for HIV or not.  Cancer screening tests  Cervical cancer screening: If you have a cervix, begin getting regular cervical cancer screening tests starting at age 21.  Breast cancer scan (mammogram): If you've ever had breasts, begin having regular mammograms starting at age 40. This is a scan to check for breast cancer.  Colon cancer screening: It is important to start screening for colon cancer at age 45.  Have a colonoscopy test every 10 years (or more often if you're at risk) Or, ask your provider about stool tests like a FIT test every year or Cologuard test every 3 years.  To learn more about your testing options, visit:   .  For help making a decision, visit:   https://bit.ly/iw66138.  Prostate cancer screening test: If you have a prostate, ask your care team if a prostate cancer screening test (PSA) at age 55 is right for you.  Lung cancer screening: If you are a current or former smoker ages 50 to 80, ask your care team if ongoing lung cancer screenings are right for you.  For informational purposes only. Not to replace the advice of your health care provider. Copyright   2023 [facility]. All rights reserved. Clinically reviewed by the [facility] Transitions Program. [employer] [id number] - REV 01/24.  Learning About Stress  What is stress?     Stress is your body's response to a hard situation. Your body can have a physical, emotional, or mental response. Stress is a fact of life for most people, and it  affects everyone differently. What causes stress for you may not be stressful for someone else.  A lot of things can cause stress. You may feel stress when you go on a job interview, take a test, or run a race. This kind of short-term stress is normal and even useful. It can help you if you need to work hard or react quickly. For example, stress can help you finish an important job on time.  Long-term stress is caused by ongoing stressful situations or events. Examples of long-term stress include long-term health problems, ongoing problems at work, or conflicts in your family. Long-term stress can harm your health.  How does stress affect your health?  When you are stressed, your body responds as though you are in danger. It makes hormones that speed up your heart, make you breathe faster, and give you a burst of energy. This is called the fight-or-flight stress response. If the stress is over quickly, your body goes back to normal and no harm is done.  But if stress happens too often or lasts too long, it can have bad effects. Long-term stress can make you more likely to get sick, and it can make symptoms of some diseases worse. If you tense up when you are stressed, you may develop neck, shoulder, or low back pain. Stress is linked to high blood pressure and heart disease.  Stress also harms your emotional health. It can make you benoit, tense, or depressed. Your relationships may suffer, and you may not do well at work or school.  What can you do to manage stress?  You can try these things to help manage stress:   Do something active. Exercise or activity can help reduce stress. Walking is a great way to get started. Even everyday activities such as housecleaning or yard work can help.  Try yoga or meaghan chi. These techniques combine exercise and meditation. You may need some training at first to learn them.  Do something you enjoy. For example, listen to music or go to a movie. Practice your hobby or do volunteer  "work.  Meditate. This can help you relax, because you are not worrying about what happened before or what may happen in the future.  Do guided imagery. Imagine yourself in any setting that helps you feel calm. You can use online videos, books, or a teacher to guide you.  Do breathing exercises. For example:  From a standing position, bend forward from the waist with your knees slightly bent. Let your arms dangle close to the floor.  Breathe in slowly and deeply as you return to a standing position. Roll up slowly and lift your head last.  Hold your breath for just a few seconds in the standing position.  Breathe out slowly and bend forward from the waist.  Let your feelings out. Talk, laugh, cry, and express anger when you need to. Talking with supportive friends or family, a counselor, or a yeison leader about your feelings is a healthy way to relieve stress. Avoid discussing your feelings with people who make you feel worse.  Write. It may help to write about things that are bothering you. This helps you find out how much stress you feel and what is causing it. When you know this, you can find better ways to cope.  What can you do to prevent stress?  You might try some of these things to help prevent stress:  Manage your time. This helps you find time to do the things you want and need to do.  Get enough sleep. Your body recovers from the stresses of the day while you are sleeping.  Get support. Your family, friends, and community can make a difference in how you experience stress.  Limit your news feed. Avoid or limit time on social media or news that may make you feel stressed.  Do something active. Exercise or activity can help reduce stress. Walking is a great way to get started.  Where can you learn more?  Go to https://www.healthwise.net/patiented  Enter N032 in the search box to learn more about \"Learning About Stress.\"  Current as of: October 24, 2023               Content Version: 14.0    2699-1431 " Healthwise, [employer].   Care instructions adapted under license by your healthcare professional. If you have questions about a medical condition or this instruction, always ask your healthcare professional. Healthwise, [employer] disclaims any warranty or liability for your use of this information.

## 2024-10-03 NOTE — PROGRESS NOTES
"Preventive Care Visit  [facility] Deirdre E. Milligan, MD, Internal Medicine - Pediatrics  Oct 3, 2024      Assessment & Plan     Routine general medical examination at a health care facility  Blood pressure looks good! Home readings 120s/70-80s.     Screen for colon cancer  Colonoscopy is scheduled.    Morbid obesity (H)  Follows with weight . On wegovy.    Visit for screening mammogram  - MA Screen Bilateral w/Pete; Future            BMI  Estimated body mass index is 30.27 kg/m  as calculated from the following:    Height as of this encounter: 1.746 m (5' 8.75\").    Weight as of this encounter: 92.3 kg (203 lb 8 oz).       Counseling  Appropriate preventive services were addressed with this patient via screening, questionnaire, or discussion as appropriate for fall prevention, nutrition, physical activity, Tobacco-use cessation, social engagement, weight loss and cognition.  Checklist reviewing preventive services available has been given to the patient.  Reviewed patient's diet, addressing concerns and/or questions.   She is at risk for lack of exercise and has been provided with information to increase physical activity for the benefit of her well-being.           Aniyah Evans is a 49 year old, presenting for the following:  Physical        10/3/2024     8:00 AM   Additional Questions   Roomed by Penny   Accompanied by none         10/3/2024     8:00 AM   Patient Reported Additional Medications   Patient reports taking the following new medications none        Health Care Directive  Patient does not have a Health Care Directive or Living Will: Discussed advance care planning with patient; information given to patient to review.    HPI    Blood pressure at home 120/ 70-80s              10/1/2024   General Health   How would you rate your overall physical health? Good   Feel stress (tense, anxious, or unable to sleep) Very much      (!) STRESS CONCERN      10/1/2024 "   Nutrition   Three or more servings of calcium each day? Yes   Diet: Regular (no restrictions)   How many servings of fruit and vegetables per day? (!) 2-3   How many sweetened beverages each day? 0-1            10/1/2024   Exercise   Days per week of moderate/strenous exercise 1 day   Average minutes spent exercising at this level 20 min      (!) EXERCISE CONCERN      10/1/2024   Social Factors   Frequency of gathering with friends or relatives Once a week   Worry food won't last until get money to buy more Patient declined   Food not last or not have enough money for food? Patient declined   Do you have housing? (Housing is defined as stable permanent housing and does not include staying ouside in a car, in a tent, in an abandoned building, in an overnight shelter, or couch-surfing.) Patient declined   Are you worried about losing your housing? Patient declined   Lack of transportation? Patient declined   Unable to get utilities (heat,electricity)? Patient declined            10/1/2024   Dental   Dentist two times every year? Yes            10/1/2024   TB Screening   Were you born outside of the [place]? No              Today's PHQ-2 Score:       4/25/2024     8:33 AM   PHQ-2 ( 1999 [employer])   Q1: Little interest or pleasure in doing things 1   Q2: Feeling down, depressed or hopeless 0   PHQ-2 Score 1   Q1: Little interest or pleasure in doing things Several days   Q2: Feeling down, depressed or hopeless Not at all   PHQ-2 Score 1         10/1/2024   Substance Use   Alcohol more than 3/day or more than 7/wk No   Do you use any other substances recreationally? No        Social History     Tobacco Use    Smoking status: Never     Passive exposure: Never    Smokeless tobacco: Never   Vaping Use    Vaping status: Never Used   Substance Use Topics    Alcohol use: Not Currently     Alcohol/week: 0.0 - 21.0 standard drinks of alcohol     Comment: social    Drug use: No           9/20/2023   LAST FHS-7 RESULTS   1st degree  "relative breast or ovarian cancer Yes   Any relative bilateral breast cancer No   Any male have breast cancer No   Any ONE woman have BOTH breast AND ovarian cancer No   Any woman with breast cancer before 50yrs No   2 or more relatives with breast AND/OR ovarian cancer Yes   2 or more relatives with breast AND/OR bowel cancer No                   10/1/2024   STI Screening   New sexual partner(s) since last STI/HIV test? No        History of abnormal Pap smear: No - age 30- 64 PAP with HPV every 5 years recommended        Latest Ref Rng & Units 9/21/2023     8:59 AM 2/28/2019     3:59 PM 2/28/2019     2:50 PM   PAP / HPV   PAP  Negative for Intraepithelial Lesion or Malignancy (NILM)      PAP (Historical)    NIL    HPV 16 DNA Negative Negative  Negative     HPV 18 DNA Negative Negative  Negative     Other HR HPV Negative Negative  Negative       ASCVD Risk   The 10-year ASCVD risk score (Susie AYALA, et al., 2019) is: 0.7%    Values used to calculate the score:      Age: 49 years      Sex: Female      Is Non- : No      Diabetic: No      Tobacco smoker: No      Systolic Blood Pressure: 100 mmHg      Is BP treated: Yes      HDL Cholesterol: 41 mg/dL      Total Cholesterol: 135 mg/dL        10/1/2024   Contraception/Family Planning   Questions about contraception or family planning No           Reviewed and updated as needed this visit by Provider                             Objective    Exam  /66 (BP Location: Right arm, Patient Position: Sitting, Cuff Size: Adult Large)   Pulse 92   Temp 98.2  F (36.8  C) (Tympanic)   Resp 18   Ht 1.746 m (5' 8.75\")   Wt 92.3 kg (203 lb 8 oz)   LMP  (LMP Unknown)   SpO2 98%   BMI 30.27 kg/m     Estimated body mass index is 30.27 kg/m  as calculated from the following:    Height as of this encounter: 1.746 m (5' 8.75\").    Weight as of this encounter: 92.3 kg (203 lb 8 oz).    Physical Exam  GENERAL: alert and no distress  EYES: Eyes " grossly normal to inspection, and conjunctivae and sclerae normal  HENT: ear canals and TM's normal, nose and mouth without ulcers or lesions  NECK: no adenopathy, no asymmetry, masses, or scars  RESP: lungs clear to auscultation - no rales, rhonchi or wheezes  CV: regular rate and rhythm, normal S1 S2, no S3 or S4, no murmur, click or rub, no peripheral edema  MS: no gross musculoskeletal defects noted, no edema  SKIN: no suspicious lesions or rashes  NEURO: Normal strength and tone, mentation intact and speech normal  PSYCH: mentation appears normal, affect normal/bright        Signed Electronically by: Deirdre E. Milligan, MD

## 2024-10-19 DIAGNOSIS — F41.1 ANXIETY STATE: ICD-10-CM

## 2024-10-19 DIAGNOSIS — F32.A ANXIETY AND DEPRESSION: ICD-10-CM

## 2024-10-19 DIAGNOSIS — F41.9 ANXIETY AND DEPRESSION: ICD-10-CM

## 2024-10-21 RX ORDER — BUPROPION HYDROCHLORIDE 150 MG/1
150 TABLET ORAL EVERY MORNING
Qty: 90 TABLET | Refills: 4 | Status: SHIPPED | OUTPATIENT
Start: 2024-10-21

## 2024-10-30 ENCOUNTER — OFFICE VISIT (OUTPATIENT)
Dept: SLEEP MEDICINE | Facility: CLINIC | Age: 49
End: 2024-10-30
Attending: STUDENT IN AN ORGANIZED HEALTH CARE EDUCATION/TRAINING PROGRAM
Payer: COMMERCIAL

## 2024-10-30 VITALS
BODY MASS INDEX: 29.96 KG/M2 | SYSTOLIC BLOOD PRESSURE: 132 MMHG | OXYGEN SATURATION: 99 % | WEIGHT: 201.4 LBS | DIASTOLIC BLOOD PRESSURE: 97 MMHG | HEART RATE: 94 BPM

## 2024-10-30 DIAGNOSIS — I10 BENIGN ESSENTIAL HYPERTENSION: ICD-10-CM

## 2024-10-30 DIAGNOSIS — R06.83 SNORING: ICD-10-CM

## 2024-10-30 PROCEDURE — 99204 OFFICE O/P NEW MOD 45 MIN: CPT | Performed by: PHYSICIAN ASSISTANT

## 2024-10-30 ASSESSMENT — SLEEP AND FATIGUE QUESTIONNAIRES
HOW LIKELY ARE YOU TO NOD OFF OR FALL ASLEEP WHILE WATCHING TV: SLIGHT CHANCE OF DOZING
HOW LIKELY ARE YOU TO NOD OFF OR FALL ASLEEP WHILE SITTING QUIETLY AFTER LUNCH WITHOUT ALCOHOL: WOULD NEVER DOZE
HOW LIKELY ARE YOU TO NOD OFF OR FALL ASLEEP WHILE SITTING AND READING: WOULD NEVER DOZE
HOW LIKELY ARE YOU TO NOD OFF OR FALL ASLEEP IN A CAR, WHILE STOPPED FOR A FEW MINUTES IN TRAFFIC: WOULD NEVER DOZE
HOW LIKELY ARE YOU TO NOD OFF OR FALL ASLEEP WHILE SITTING INACTIVE IN A PUBLIC PLACE: WOULD NEVER DOZE
HOW LIKELY ARE YOU TO NOD OFF OR FALL ASLEEP WHEN YOU ARE A PASSENGER IN A CAR FOR AN HOUR WITHOUT A BREAK: SLIGHT CHANCE OF DOZING
HOW LIKELY ARE YOU TO NOD OFF OR FALL ASLEEP WHILE LYING DOWN TO REST IN THE AFTERNOON WHEN CIRCUMSTANCES PERMIT: MODERATE CHANCE OF DOZING
HOW LIKELY ARE YOU TO NOD OFF OR FALL ASLEEP WHILE SITTING AND TALKING TO SOMEONE: WOULD NEVER DOZE

## 2024-10-30 NOTE — PROGRESS NOTES
Outpatient Sleep Medicine Consultation:    Name: Cristina Eaton MRN# [id number]   Age: 49 year old YOB: 1975     Date of Consultation: October 30, 2024  Consultation is requested by: Deirdre E Milligan, MD  [address] [place] [address] Deirdre E Milligan  Primary care provider: Milligan, Deirdre E       Reason for Sleep Consult:     Cristina Eaton is sent by Deirdre E Milligan for a sleep consultation regarding 1. Benign essential hypertension; 2. Snoring    Patient s Reason for visit  Cristina Eaton main reason for visit: (Patient-Rptd) Primary doctor recommended  Patient states problem(s) started: (Patient-Rptd) Teens  Cristina Eaton's goals for this visit:           Assessment and Plan:     1. Snoring  2. Benign essential hypertension    Patient presents to clinic today for evaluation of possible obstructive sleep apnea in the context of loud disruptive snoring and comorbid hypertension.  No witnessed apneic events and is unaware of any gasping or choking in sleep though patient does not have a bed partner and sleeps alone so no one to report on this.  When traveling, family members will complain of her snoring so knows it can be loud but she does not wake with snort.  Denies any daytime sleepiness and no significant insomnia.  She can have some rumination particularly on weekdays when she enters bed before she is sleepy and will take diphenhydramine to help, encouraged her to use sparingly and she voiced agreement.  We spent some time today reviewing pathophysiology of primary snoring versus KONRAD and complications of untreated KONRAD such as increased risk of HTN, CAD, MI, stroke, etc.  Patient would like to pursue sleep study to evaluate and if positive would be interested in testing.  Briefly discussed in lab PSG versus HST.  HST ordered today due to insurance preference and lack of significant comorbidity requiring in lab assessment.  If significant KONRAD is seen on testing patient is  very open to starting on CPAP and would be comfortable with me placing empiric auto CPAP orders to expedite treatment set up.  If more mild sleep apnea seen on testing however she appeared more interested in mandibular advancement device which is reasonable.  Agreed to send her a message on [employer] with her study results if follow-up is scheduled out.  If testing comes back normal follow-up will be canceled.  Education materials provided in AVS.  - HST-Home Sleep Apnea Test - Noxturnal Returnable; Future         History of Present Illness:     Cristina Eaton is a 49-year-old female with anxiety, depression, HTN who presents to clinic today for evaluation of snoring and concern of possible underlying KONRAD that could be contributing to HTN.    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  Wakes up due to (Patient-Rptd) Use the bathroom;Anxiety  She has trouble falling back asleep (Patient-Rptd) 1-2 times a week.   It usually takes (Patient-Rptd) 1-2 hours on a bad night.  15-20 minutes on a good night. to get back to sleep  Patient is usually up at (Patient-Rptd) 6am  Uses alarm: (Patient-Rptd) Yes    Weekends/Non-work Days/All Other Days:  Usually gets into bed at (Patient-Rptd) 10-11 pm  Takes patient about (Patient-Rptd) 15 minutes to fall asleep  Patient is usually up at (Patient-Rptd) 7-8am  Uses alarm: (Patient-Rptd) No    Sleep Need  Patient estimates she gets (Patient-Rptd) 8 hours sleep on average   Patient thinks she needs about (Patient-Rptd) 7 hours sleep    Cristina Eaton prefers to  sleep in this position(s): (Patient-Rptd) Stomach   Patient states they do the following activities in bed: (Patient-Rptd) Watch TV;Use phone, computer, or tablet    Naps  Patient takes a purposeful nap (Patient-Rptd) 1 time a week and naps are usually (Patient-Rptd) 1 hour in duration  She feels better after a nap: (Patient-Rptd) Yes  She dozes off unintentionally (Patient-Rptd) 0 days per week  Patient has had a driving accident or near-miss due to sleepiness/drowsiness: (Patient-Rptd) No      SLEEP DISRUPTIONS:  No bed partner, has been sleeping separately  Breathing/Snoring  Patient snores:(Patient-Rptd) Yes for years   Other people complain about her snoring: (Patient-Rptd) Yes  Patient has been told she stops breathing in her sleep:(Patient-Rptd) No  Gasping/choking:No  She has issues with the following: (Patient-Rptd) Getting up to urinate more than once  Denies morning headache, morning nasal congestion, morning dry mouth, nocturnal GERD    Movement:  Patient gets pain, discomfort, with an urge to move:  (Patient-Rptd) No  It happens when she is resting:  (Patient-Rptd) No  It happens more at night:  (Patient-Rptd) No  Patient has been told she kicks her legs at night:  (Patient-Rptd) No     Behaviors in Sleep:  Cristina Eaton has experienced the following behaviors while sleeping: (Patient-Rptd) Recurring Nightmare - intermittent since childhood;Waking up paralyzed -rare/intermittent     Denies sleepwalking, sleep talking, sleep eating, bruxism, dream enactment, night terrors, hypnagogic/hypnopompic hallucinations, cataplexy      CAFFEINE AND OTHER SUBSTANCES:    Patient consumes caffeinated beverages per day:  (Patient-Rptd) 1-2  Last caffeine use is usually: (Patient-Rptd) 2pm  List of any prescribed or over the counter stimulants that patient takes: (Patient-Rptd) None  List of any prescribed or over the counter sleep medication patient takes: (Patient-Rptd) Nyquil  List of previous sleep medications  that patient has tried: (Patient-Rptd) Melatonin benedryl  Patient drinks alcohol to help them sleep: (Patient-Rptd) No  Patient drinks alcohol near bedtime: (Patient-Rptd) No    Family History:  Patient has a family member been diagnosed with a sleep disorder: (Patient-Rptd) Yes  (Patient-Rptd) Father sleep apnea.   at 60.     SCALES:    EPWORTH SLEEPINESS SCALE         10/30/2024     8:00 AM    Warren Sleepiness Scale ( JOSSY Harrington  [id number]<br>ESS - [place]/English - Final version -  - [facility] [place].)   Sitting and reading Would never doze   Watching TV Slight chance of dozing   Sitting, inactive in a public place (e.g. a theatre or a meeting) Would never doze   As a passenger in a car for an hour without a break Slight chance of dozing   Lying down to rest in the afternoon when circumstances permit Moderate chance of dozing   Sitting and talking to someone Would never doze   Sitting quietly after a lunch without alcohol Would never doze   In a car, while stopped for a few minutes in traffic Would never doze   Warren Score (MC) 4   Warren Score (Sleep) 4        Patient-reported       INSOMNIA SEVERITY INDEX (ALDA)          10/30/2024     7:41 AM   Insomnia Severity Index (ALDA)   Difficulty falling asleep 2    Difficulty staying asleep 2    Problems waking up too early 1    How SATISFIED/DISSATISFIED are you with your CURRENT sleep pattern? 2    How NOTICEABLE to others do you think your sleep problem is in terms of impairing the quality of your life? 0    How WORRIED/DISTRESSED are you about your current sleep problem? 1    To what extent do you consider your sleep problem to INTERFERE with your daily functioning (e.g. daytime fatigue, mood, ability to function at work/daily chores, concentration, memory, mood, etc.) CURRENTLY? 1    ALDA Total Score 9        Patient-reported       Guidelines for Scoring/Interpretation:  Total score categories:  0-7 = No clinically significant insomnia   8-14 =  Subthreshold insomnia   15-21 = Clinical insomnia (moderate severity)  22-28 = Clinical insomnia (severe)  Used via courtesy of www.myhealth.va.gov with permission from Benedicto Irby PhD., [facility]    Allergies:    No Known Allergies    Medications:    Current Outpatient Medications   Medication Sig Dispense Refill    albuterol (PROAIR HFA/PROVENTIL HFA/VENTOLIN HFA) 108 (90 Base) MCG/ACT inhaler Inhale 2 puffs into the lungs every 6 hours as needed for shortness of breath or wheezing 18 g 3    amLODIPine (NORVASC) 5 MG tablet Take 1 tablet (5 mg) by mouth daily 90 tablet 4    buPROPion (WELLBUTRIN XL) 150 MG 24 hr tablet Take 1 tablet (150 mg) by mouth every morning. 90 tablet 4    levonorgestrel (MIRENA) 20 MCG/24HR IUD 1 each (20 mcg) by Intrauterine route once      losartan (COZAAR) 100 MG tablet Take 1 tablet (100 mg) by mouth daily 90 tablet 4    metFORMIN (GLUCOPHAGE XR) 500 MG 24 hr tablet TAKE ONE TABLET BY MOUTH TWICE A DAY WITH MEALS 180 tablet 3    Semaglutide-Weight Management (WEGOVY) 2.4 MG/0.75ML pen Inject 2.4 mg subcutaneously once a week. Do not start before September 23, 2024. 9 mL 3       Problem List:  Patient Active Problem List    Diagnosis Date Noted    Nutritional counseling 04/06/2023     Priority: Medium    Asthma 01/26/2023     Priority: Medium    Gastroesophageal reflux disease without esophagitis 01/26/2023     Priority: Medium    Morbid obesity (H) 01/26/2023     Priority: Medium    Knee pain 01/26/2023     Priority: Medium    IUD (intrauterine device) in place 10/06/2021     Priority: Medium    Menorrhagia with regular cycle 09/29/2021     Priority: Medium    Obesity 07/08/2021     Priority: Medium    Anxiety Disorder NOS      Priority: Medium     Created by Conversion  Replacement Utility updated for latest IMO load      Formatting of this note might be different from the original.  Created by Conversion    Replacement Utility updated for latest IMO load      Irritable  bowel syndrome      Priority: Medium     Created by Conversion      Formatting of this note might be different from the original.  Created by Conversion          Past Medical/Surgical History:  Past Medical History:   Diagnosis Date    Anxiety 2009    Asthma     Gastroesophageal reflux disease without esophagitis     Knee pain     Menorrhagia with regular cycle 09/29/2021    Obesity      Past Surgical History:   Procedure Laterality Date    LAPAROSCOPIC APPENDECTOMY  1996    Ruptured, did require inpatient IV Abx for several days    TONSILLECTOMY      WISDOM TOOTH EXTRACTION         Social History:  Social History     Socioeconomic History    Marital status:      Spouse name: Not on file    Number of children: Not on file    Years of education: Not on file    Highest education level: Not on file   Occupational History    Not on file   Tobacco Use    Smoking status: Never     Passive exposure: Never    Smokeless tobacco: Never   Vaping Use    Vaping status: Never Used   Substance and Sexual Activity    Alcohol use: Not Currently     Alcohol/week: 0.0 - 21.0 standard drinks of alcohol     Comment: social    Drug use: No    Sexual activity: Yes     Partners: Male     Birth control/protection: None, I.U.D.     Comment: Mirena 10/6/2021   Other Topics Concern    Not on file   Social History Narrative    . One child 15 yo. Working FT for "[employer].  works for [employer]     Social Drivers of Health     Financial Resource Strain: Unknown (10/1/2024)    Financial Resource Strain     Within the past 12 months, have you or your family members you live with been unable to get utilities (heat, electricity) when it was really needed?: Patient declined   Food Insecurity: Unknown (10/1/2024)    Food Insecurity     Within the past 12 months, did you worry that your food would run out before you got money to buy more?: Patient declined     Within the past 12 months, did the food you bought just not last and  you didn t have money to get more?: Patient declined   Transportation Needs: Unknown (10/1/2024)    Transportation Needs     Within the past 12 months, has lack of transportation kept you from medical appointments, getting your medicines, non-medical meetings or appointments, work, or from getting things that you need?: Patient declined   Physical Activity: Insufficiently Active (10/1/2024)    Exercise Vital Sign     Days of Exercise per Week: 1 day     Minutes of Exercise per Session: 20 min   Stress: Stress Concern Present (10/1/2024)    [facility] [place] [employer] - Occupational Stress Questionnaire     Feeling of Stress : Very much   Social Connections: Unknown (10/1/2024)    Social Connection and Isolation Panel [NHANES]     Frequency of Communication with Friends and Family: Not on file     Frequency of Social Gatherings with Friends and Family: Once a week     Attends Restorationist Services: Not on file     Active Member of Clubs or Organizations: Not on file     Attends Club or Organization Meetings: Not on file     Marital Status: Not on file   Interpersonal Safety: Low Risk  (10/3/2024)    Interpersonal Safety     Do you feel physically and emotionally safe where you currently live?: Yes     Within the past 12 months, have you been hit, slapped, kicked or otherwise physically hurt by someone?: No     Within the past 12 months, have you been humiliated or emotionally abused in other ways by your partner or ex-partner?: No   Housing Stability: Unknown (10/1/2024)    Housing Stability     Do you have housing? : Patient declined     Are you worried about losing your housing?: Patient declined       Family History:  Family History   Problem Relation Age of Onset    No Known Problems Mother     Obesity Father     Hypertension Father     Anuerysm Father         a fib, aortic anuerysm    Tremor Sister     No Known Problems Sister     No Known Problems Brother     No Known Problems Maternal Grandmother      Endometrial Cancer Maternal Grandfather     No Known Problems Paternal Grandmother     No Known Problems Paternal Grandfather     No Known Problems Daughter     Breast Cancer Maternal Aunt     Breast Cancer Maternal Aunt 38    No Known Problems Paternal Aunt     Hereditary Breast and Ovarian Cancer Syndrome No family hx of     Cancer No family hx of     Colon Cancer No family hx of     Ovarian Cancer No family hx of        Review of Systems:  In the last TWO WEEKS have you experienced any of the following symptoms?  Fevers: (Patient-Rptd) No  Night Sweats: (Patient-Rptd) No  Weight Gain: (Patient-Rptd) No  Pain at Night: (Patient-Rptd) No  Double Vision: (Patient-Rptd) No  Changes in Vision: (Patient-Rptd) No  Difficulty Breathing through Nose: (Patient-Rptd) No  Sore Throat in Morning: (Patient-Rptd) No  Dry Mouth in the Morning: (Patient-Rptd) No  Shortness of Breath Lying Flat: (Patient-Rptd) No  Shortness of Breath With Activity: (Patient-Rptd) No  Awakening with Shortness of Breath: (Patient-Rptd) No  Increased Cough: (Patient-Rptd) No  Heart Racing at Night: (Patient-Rptd) No  Swelling in Feet or Legs: (Patient-Rptd) No  Diarrhea at Night: (Patient-Rptd) No  Heartburn at Night: (Patient-Rptd) No  Urinating More than Once at Night: (Patient-Rptd) Yes  Losing Control of Urine at Night: (Patient-Rptd) No  Joint Pains at Night: (Patient-Rptd) No  Headaches in Morning: (Patient-Rptd) Yes  Weakness in Arms or Legs: (Patient-Rptd) No  Depressed Mood: (Patient-Rptd) No  Anxiety: (Patient-Rptd) Yes     Physical Examination:  Vitals: BP (!) 132/97   Pulse 94   Wt 91.4 kg (201 lb 6.4 oz)   LMP  (LMP Unknown)   SpO2 99%   BMI 29.96 kg/m    BMI= Body mass index is 29.96 kg/m .  General appearance: Awake, alert, cooperative. Well groomed. Sitting comfortably in chair. In no apparent distress.  HEENT: Head: Normocephalic, atraumatic. Eyes: PERRL. Conjunctiva clear. Sclera normal. Nose: External appearance normal.  "Oropharynx: Mallampati Classification:II. Tonsils:0  surgically removed.   Neck: Neck Cir (cm): 38 cm (10/30/2024  7:44 AM)  Skin: No rashes or significant lesions.   Neurologic: Alert, oriented x3. No focal neurological deficit. Gait normal.   Psychiatric: Mood euthymic. Affect congruent with full range and intensity.         Data: All pertinent previous laboratory data reviewed     Recent Labs   Lab Test 03/26/24  1524 03/17/24  1138    137   POTASSIUM 3.7 3.3*   CHLORIDE 105 101   CO2 25 20*   ANIONGAP 12 16*   GLC 89 124*   BUN 14.0 7.8   CR 0.80 0.80   OLI 9.1 9.5       Recent Labs   Lab Test 03/17/24  1138   WBC 6.3   RBC 4.87   HGB 14.5   HCT 41.3   MCV 85   MCH 29.8   MCHC 35.1   RDW 12.9          Recent Labs   Lab Test 09/21/23  0931   PROTTOTAL 7.0   ALBUMIN 4.3   BILITOTAL 0.7   ALKPHOS 81   AST 20   ALT 19       TSH (uIU/mL)   Date Value   03/17/2024 0.60   09/21/2023 1.04       No results found for: \"UAMP\", \"UBARB\", \"BENZODIAZEUR\", \"UCANN\", \"UCOC\", \"OPIT\", \"UPCP\"    Ferritin   Date/Time Value Ref Range Status   01/26/2023 11:17 AM 43 6 - 175 ng/mL Final       No results found for: \"PH\", \"PHARTERIAL\", \"PO2\", \"HL8PORBQURX\", \"SAT\", \"PCO2\", \"HCO3\", \"BASEEXCESS\", \"AILYN\", \"BEB\"    @LABRCNTIPR(phv:4,pco2v:4,po2v:4,hco3v:4,asiya:4,o2per:4)@    Echocardiology:   Study Date: 04/11/2024 08:03 AM  Age: 49 yrs  Gender: Female  Patient Location: [facility]  Reason For Study: Left ventricular hypertrophy  Ordering Physician: JOSE BROWN  Referring Physician: JOSE BROWN  Performed By: Renée Barnhart     BSA: 2.1 m2  Height: 70 in  Weight: 201 lb  HR: 82  BP: 162/111 mmHg  ______________________________________________________________________________  Procedure  Complete Echo Adult.  ______________________________________________________________________________  Interpretation Summary     Essentially normal adult cardiac echo without " LVH.  ______________________________________________________________________________  Left Ventricle  The left ventricle is normal in size. There is normal left ventricular wall  thickness. The visual ejection fraction is 60-65%. Grade I or early diastolic  dysfunction. Diastolic Doppler findings (E/E' ratio and/or other parameters)  suggest left ventricular filling pressures are normal. No regional wall motion  abnormalities noted.     Right Ventricle  The right ventricle is normal in structure, function and size.     Atria  Normal left atrial size. Right atrial size is normal. There is no atrial shunt  seen.     Mitral Valve  The mitral valve leaflets appear normal. There is no evidence of stenosis,  fluttering, or prolapse. There is trace mitral regurgitation.     Tricuspid Valve  The tricuspid valve is normal in structure and function. There is physiologic  tricuspid regurgitation. IVC diameter <2.1 cm collapsing >50% with sniff  suggests a normal RA pressure of 3 mmHg. Right ventricular systolic pressure  could not be approximated due to inadequate tricuspid regurgitation.     Aortic Valve  The aortic valve is normal in structure and function. No aortic regurgitation  is present. No aortic stenosis is present.     Pulmonic Valve  There is no pulmonic valvular regurgitation. There is trace pulmonic valvular  regurgitation.     Vessels  Normal size aorta.     Pericardium  There is no pericardial effusion.     Rhythm  Sinus rhythm was noted.    Chest x-ray: No results found for this or any previous visit from the past 365 days.      Chest CT:   CT Chest Pulmonary Embolism w Contrast 03/17/2024    Narrative  EXAM: CT CHEST PULMONARY EMBOLISM W CONTRAST  LOCATION: [facility]  DATE: 3/17/2024    INDICATION: elevated d dimer  COMPARISON: None.  TECHNIQUE: CT chest pulmonary angiogram during arterial phase injection of IV contrast. Multiplanar reformats and MIP reconstructions were performed.  "Dose reduction techniques were used.  CONTRAST: 75ml Isovue 370    FINDINGS:  ANGIOGRAM CHEST: Pulmonary arteries are normal caliber and negative for pulmonary emboli. Thoracic aorta is negative for dissection. No CT evidence of right heart strain.    LUNGS AND PLEURA: Central airways are clear. Scattered calcified granulomas. No areas of consolidation. No pleural effusion.    MEDIASTINUM/AXILLAE: Possible left ventricular hypertrophy. No pericardial effusion. No thoracic lymphadenopathy. Heterogeneous thyroid without worrisome nodule.    CORONARY ARTERY CALCIFICATION: None.    UPPER ABDOMEN: No acute findings.    MUSCULOSKELETAL: No acute osseous abnormality.    Impression  IMPRESSION:  1.  No pulmonary embolism or other acute cardiopulmonary abnormality.  2.  Possible left ventricular hypertrophy. Recommend nonemergent evaluation with echocardiogram.      PFT: Most Recent [facility] Pulmonary Function Testing    No results found for: \"20001\"  No results found for: \"20002\"  No results found for: \"20003\"  No results found for: \"20015\"  No results found for: \"20016\"  No results found for: \"20027\"  No results found for: \"20028\"  No results found for: \"20029\"  No results found for: \"20079\"  No results found for: \"20080\"  No results found for: \"20081\"  No results found for: \"20335\"  No results found for: \"20105\"  No results found for: \"20053\"  No results found for: \"20054\"  No results found for: \"20055\"      Risa Kay PA-C 10/30/2024     [facility]  [address] [address]     [facility]  [address] [address]    Chart documentation was completed, in part, with [email or web address] voice-recognition software. Even though reviewed, some grammatical, spelling, and word errors may remain.    45 minutes spent on day of encounter reviewing medical records, history and physical examination, review of previous testing and " interpretation, documentation and further activities as noted above

## 2024-10-30 NOTE — PATIENT INSTRUCTIONS
"        MY TREATMENT INFORMATION FOR SLEEP APNEA-  Cristina Eaton    Am I having a home sleep study?  --->Watch the video for the device you are using:    -/drop off device-   https://www.[employer].com/watch?v=yGGFBdELGhk    Frequently asked questions:  1. What is Obstructive Sleep Apnea (KONRAD)? KONRAD is the most common type of sleep apnea. Apnea means, \"without breath.\"  Apnea is most often caused by narrowing or collapse of the upper airway as muscles relax during sleep.   Almost everyone has occasional apneas. Most people with sleep apnea have had brief interruptions at night frequently for many years.  The severity of sleep apnea is related to how frequent and severe the events are.   2. What are the consequences of KONRAD? Symptoms include: feeling sleepy during the day, snoring loudly, gasping or stopping of breathing, trouble sleeping, and occasionally morning headaches or heartburn at night.  Sleepiness can be serious and even increase the risk of falling asleep while driving. Other health consequences may include development of high blood pressure and other cardiovascular disease in persons who are susceptible. Untreated KONRAD  can contribute to heart disease, stroke and diabetes.   3. What are the treatment options? In most situations, sleep apnea is a lifelong disease that must be managed with daily therapy. Medications are not effective for sleep apnea and surgery is generally not considered until other therapies have been tried. Your treatment is your choice . Continuous Positive Airway (CPAP) works right away and is the therapy that is effective in nearly everyone. An oral device to hold your jaw forward is usually the next most reliable option. Other options include postioning devices (to keep you off your back), weight loss, and surgery including a tongue pacing device. There is more detail about some of these options below.  4. Are my sleep studies covered by insurance? Although we will request " verification of coverage, we advise you also check in advance of the study to ensure there is coverage.    Important tips for those choosing CPAP and similar devices  REMEMBER-IF YOU RECEIVE A CALL FROM  [phone]-->IT IS TO SETUP A DEVICE  For new devices, sign up for device DORITA to monitor your device for your followup visits  We encourage you to utilize the [employer] dorita or website ( https://MainOne.[employer]/ ) to monitor your therapy progress and share the data with your healthcare team when you discuss your sleep apnea.                                                    Know your equipment:  CPAP is continuous positive airway pressure that prevents obstructive sleep apnea by keeping the throat from collapsing while you are sleeping. In most cases, the device is  smart  and can slowly self-adjusts if your throat collapses and keeps a record every day of how well you are treated-this information is available to you and your care team.  BPAP is bilevel positive airway pressure that keeps your throat open and also assists each breath with a pressure boost to maintain adequate breathing.  Special kinds of BPAP are used in patients who have inadequate breathing from lung or heart disease. In most cases, the device is  smart  and can slowly self-adjusts to assist breathing. Like CPAP, the device keeps a record of how well you are treated.  Your mask is your connection to the device. You get to choose what feels most comfortable and the staff will help to make sure if fits. Here: are some examples of the different masks that are available: Magnetic mask aids may assist with use but there are safety issues that should be addressed when considering with magnets* ( see end of discussion).       Key points to remember on your journey with sleep apnea:  Sleep study.  PAP devices often need to be adjusted during a sleep study to show that they are effective and adjusted right.  Good tips to remember: Try wearing just  the mask during a quiet time during the day so your body adapts to wearing it. A humidifier is recommended for comfort in most cases to prevent drying of your nose and throat. Allergy medication from your provider may help you if you are having nasal congestion.  Getting settled-in. It takes more than one night for most of us to get used to wearing a mask. Try wearing just the mask during a quiet time during the day so your body adapts to wearing it. A humidifier is recommended for comfort in most cases. Our team will work with you carefully on the first day and will be in contact within 4 days and again at 2 and 4 weeks for advice and remote device adjustments. Your therapy is evaluated by the device each day.   Use it every night. The more you are able to sleep naturally for 7-8 hours, the more likely you will have good sleep and to prevent health risks or symptoms from sleep apnea. Even if you use it 4 hours it helps. Occasionally all of us are unable to use a medical therapy, in sleep apnea, it is not dangerous to miss one night.   Communicate. Call our skilled team on the number provided on the first day if your visit for problems that make it difficult to wear the device. Over 2 out of 3 patients can learn to wear the device long-term with help from our team. Remember to call our team or your sleep providers if you are unable to wear the device as we may have other solutions for those who cannot adapt to mask CPAP therapy. It is recommended that you sleep your sleep provider within the first 3 months and yearly after that if you are not having problems.   Use it for your health. We encourage use of CPAP masks during daytime quiet periods to allow your face and brain to adapt to the sensation of CPAP so that it will be a more natural sensation to awaken to at night or during naps. This can be very useful during the first few weeks or months of adapting to CPAP though it does not help medically to wear CPAP  during wakefulness and  should not be used as a strategy just to meet guidelines.  Take care of your equipment. Make sure you clean your mask and tubing using directions every day and that your filter and mask are replaced as recommended or if they are not working.     *Masks with magnets:  Updated Contraindications  Masks with magnetic components are contraindicated for use by patients where they, or anyone in close physical contact while using the mask, have the following:   Active medical implants that interact with magnets (i.e., pacemakers, implantable cardioverter defibrillators (ICD), neurostimulators, cerebrospinal fluid (CSF) shunts, insulin/infusion pumps)   Metallic implants/objects containing ferromagnetic material (i.e., aneurysm clips/flow disruption devices, embolic coils, stents, valves, electrodes, implants to restore hearing or balance with implanted magnets, ocular implants, metallic splinters in the eye)  Updated Warning  Keep the mask magnets at a safe distance of at least 6 inches (150 mm) away from implants or medical devices that may be adversely affected by magnetic interference. This warning applies to you or anyone in close physical contact with your mask. The magnets are in the frame and lower headgear clips, with a magnetic field strength of up to 400mT. When worn, they connect to secure the mask but may inadvertently detach while asleep.  Implants/medical devices, including those listed within contraindications, may be adversely affected if they change function under external magnetic fields or contain ferromagnetic materials that attract/repel to magnetic fields (some metallic implants, e.g., contact lenses with metal, dental implants, metallic cranial plates, screws, rianna hole covers, and bone substitute devices). Consult your physician and  of your implant / other medical device for information on the potential adverse effects of magnetic fields.    BESIDES CPAP, WHAT  OTHER THERAPIES ARE THERE?    Positioning Device  Positioning devices are generally used when sleep apnea is mild and only occurs on your back.This example shows a pillow that straps around the waist. It may be appropriate for those whose sleep study shows milder sleep apnea that occurs primarily when lying flat on one's back. Preliminary studies have shown benefit but effectiveness at home may need to be verified by a home sleep test. These devices are generally not covered by medical insurance.  Examples of devices that maintain sleeping on the back to prevent snoring and mild sleep apnea.    Belt type body positioner  http://[employer]/    Electronic reminder  http://nightshifttherapy.com/            Oral Appliance  What is oral appliance therapy?  An oral appliance device fits on your teeth at night like a retainer used after having braces. The device is made by a specialized dentist and requires several visits over 1-2 months before a manufactured device is made to fit your teeth and is adjusted to prevent your sleep apnea. Once an oral device is working properly, snoring should be improved. A home sleep test may be recommended at that time if to determine whether the sleep apnea is adequately treated.       Some things to remember:  -Oral devices are often, but not always, covered by your medical insurance. Be sure to check with your insurance provider.   -If you are referred for oral therapy, you will be given a list of specialized dentists to consider or you may choose to visit the Web site of the American Academy of Dental Sleep Medicine  -Oral devices are less likely to work if you have severe sleep apnea or are extremely overweight.     More detailed information  An oral appliance is a small acrylic device that fits over the upper and lower teeth  (similar to a retainer or a mouth guard). This device slightly moves jaw forward, which moves the base of the tongue forward, opens the airway, improves  breathing for effective treat snoring and obstructive sleep apnea in perhaps 7 out of 10 people .  The best working devices are custom-made by a dental device  after a mold is made of the teeth 1, 2, 3.  When is an oral appliance indicated?  Oral appliance therapy is recommended as a first-line treatment for patients with primary snoring, mild sleep apnea, and for patients with moderate sleep apnea who prefer appliance therapy to use of CPAP4, 5. Severity of sleep apnea is determined by sleep testing and is based on the number of respiratory events per hour of sleep.   How successful is oral appliance therapy?  The success rate of oral appliance therapy in patients with mild sleep apnea is 75-80% while in patients with moderate sleep apnea it is 50-70%. The chance of success in patients with severe sleep apnea is 40-50%. The research also shows that oral appliances have a beneficial effect on the cardiovascular health of KONRAD patients at the same magnitude as CPAP therapy7.  Oral appliances should be a second-line treatment in cases of severe sleep apnea, but if not completely successful then a combination therapy utilizing CPAP plus oral appliance therapy may be effective. Oral appliances tend to be effective in a broad range of patients although studies show that the patients who have the highest success are females, younger patients, those with milder disease, and less severe obesity. 3, 6.   Finding a dentist that practices dental sleep medicine  Specific training is available through the American Academy of Dental Sleep Medicine for dentists interested in working in the field of sleep. To find a dentist who is educated in the field of sleep and the use of oral appliances, near you, visit the Web site of the American Academy of Dental Sleep Medicine.    References  1. Rashaad et al. Objectively measured vs self-reported compliance during oral appliance therapy for sleep-disordered breathing. Chest  2013; 144(5): 8792-9039.  2. Atiya, et al. Objective measurement of compliance during oral appliance therapy for sleep-disordered breathing. Thorax 2013; 68(1): 91-96.  3. Aj et al. Mandibular advancement devices in 620 men and women with KONRAD and snoring: tolerability and predictors of treatment success. Chest 2004; 125: 5861-6331.  4. Clover et al. Oral appliances for snoring and KONRAD: a review. Sleep 2006; 29: 244-262.  5. Justin et al. Oral appliance treatment for KONRAD: an update. J Clin Sleep Med 2014; 10(2): 215-227.  6. Feroz et al. Predictors of OSAH treatment outcome. J Dent Res 2007; 86: 8400-1583.      Weight Loss:   Your Body mass index is 29.96 kg/m .    Being overweight does not necessarily mean you will have health consequences.  Those who have BMI over 35 or over 27 with existing medical conditions carries greater risk.   Weight loss decreases severity of sleep apnea in most people with obesity. For those with mild obesity who have developed snoring with weight gain, even 15-30 pound weight loss can improve and occasionally milder eliminate sleep apnea.  Structured and life-long dietary and health habits are necessary to lose weight and keep healthier weight levels.     The Comprehensive Weight loss program offers all aspects of weight loss strategies including two Non-Surgical Weight Loss Programs: Medical Weight Management and our 24 Week Healthy Lifestyle Program:    Medical Weight Management: You will meet with a Medical Weight Management Provider, as well as a Registered Dietician. The program may include medication therapy, dietary education, recommended exercise and physical therapy programs, monthly support group meetings, and possible psychological counseling. Follow up visits with the provider or dietician are scheduled based on your progress and needs.    24 Week Healthy Lifestyle Program: This unique program is designed to give you the support of weekly appointments  and activities thru a 24-week period. It may include all of the components of the basic program (above), with the addition of 11 individual Health  Visits, 24-week access to the [employer] website for over 700 online classes, and monthly support group meetings. This program has an out-of-pocket expense of $499 to cover the items that can not be billed to insurance (health coaches and [employer] access), and is non-refundable/non-transferable (you may be able to use a Health Savings Account; ask your HSA provider). There may be an optional meal replacement plan prescribed as well.   Surgical management achieves meaningful long-term weight loss and improvement in health risks in most patients with more severe obesity.      Sleep Apnea Surgery:    Surgery for obstructive sleep apnea is considered generally only when other therapies fail to work. Surgery may be discussed with you if you are having a difficult time tolerating CPAP and or when there is an abnormal structure that requires surgical correction.  Nose and throat surgeries often enlarge the airway to prevent collapse.  Most of these surgeries create pain for 1-2 weeks and up to half of the most common surgeries are not effective throughout life.  You should carefully discuss the benefits and drawbacks to surgery with your sleep provider and surgeon to determine if it is the best solution for you.   More information  Surgery for KONRAD is directed at areas that are responsible for narrowing or complete obstruction of the airway during sleep.  There are a wide range of procedures available to enlarge and/or stabilize the airway to prevent blockage of breathing in the three major areas where it can occur: the palate, tongue, and nasal regions.  Successful surgical treatment depends on the accurate identification of the factors responsible for obstructive sleep apnea in each person.  A personalized approach is required because there is no single treatment that  works well for everyone.  Because of anatomic variation, consultation with an examination by a sleep surgeon is a critical first step in determining what surgical options are best for each patient.  In some cases, examination during sedation may be recommended in order to guide the selection of procedures.  Patients will be counseled about risks and benefits as well as the typical recovery course after surgery. Surgery is typically not a cure for a person s KONRAD.  However, surgery will often significantly improve one s KONRAD severity (termed  success rate ).  Even in the absence of a cure, surgery will decrease the cardiovascular risk associated with OSA7; improve overall quality of life8 (sleepiness, functionality, sleep quality, etc).      Palate Procedures:  Patients with KONRAD often have narrowing of their airway in the region of their tonsils and uvula.  The goals of palate procedures are to widen the airway in this region as well as to help the tissues resist collapse.  Modern palate procedure techniques focus on tissue conservation and soft tissue rearrangement, rather than tissue removal.  Often the uvula is preserved in this procedure. Residual sleep apnea is common in patient after pharyngoplasty with an average reduction in sleep apnea events of 33%2.      Tongue Procedures:  ExamWhile patients are awake, the muscles that surround the throat are active and keep this region open for breathing. These muscles relax during sleep, allowing the tongue and other structures to collapse and block breathing.  There are several different tongue procedures available.  Selection of a tongue base procedure depends on characteristics seen on physical exam.  Generally, procedures are aimed at removing bulky tissues in this area or preventing the back of the tongue from falling back during sleep.  Success rates for tongue surgery range from 50-62%3.    Hypoglossal Nerve Stimulation:  Hypoglossal nerve stimulation has recently  received approval from the [place] Food and Drug Administration for the treatment of obstructive sleep apnea.  This is based on research showing that the system was safe and effective in treating sleep apnea6.  Results showed that the median AHI score decreased 68%, from 29.3 to 9.0. This therapy uses an implant system that senses breathing patterns and delivers mild stimulation to airway muscles, which keeps the airway open during sleep.  The system consists of three fully implanted components: a small generator (similar in size to a pacemaker), a breathing sensor, and a stimulation lead.  Using a small handheld remote, a patient turns the therapy on before bed and off upon awakening.    Candidates for this device must be greater than 18 years of age, have moderate to severe obstructive sleep apnea with less than 25% central events  (AHI between 15-65), BMI less than 35, have tried CPAP/oral appliance for at least 8 weeks without success, and have appropriate upper airway anatomy (determined by a sleep endoscopy performed by Dr. Gene Morfin or Dr. Thiago Rodriguez).    Nasal Procedures:  Nasal obstruction can interfere with nasal breathing during the day and night.  Studies have shown that relief of nasal obstruction can improve the ability of some patients to tolerate positive airway pressure therapy for obstructive sleep apnea1.  Treatment options include medications such as nasal saline, topical corticosteroid and antihistamine sprays, and oral medications such as antihistamines or decongestants. Non-surgical treatments can include external nasal dilators for selected patients. If these are not successful by themselves, surgery can improve the nasal airway either alone or in combination with these other options.        Combination Procedures:  Combination of surgical procedures and other treatments may be recommended, particularly if patients have more than one area of narrowing or persistent positional  disease.  The success rate of combination surgery ranges from 66-80%2,3.    References  Jacqueline LU. The Role of the Nose in Snoring and Obstructive Sleep Apnoea: An Update.  Eur Arch Otorhinolaryngol. 2011; 268: 1365-73.   Edward SM; Clayton JA; Zenaida JR; Pallanch JF; Corinne MB; Heri SG; Marifer GARZA. Surgical modifications of the upper airway for obstructive sleep apnea in adults: a systematic review and meta-analysis. SLEEP 2010;33(10):3155-5578. Isabel PALACIOS. Hypopharyngeal surgery in obstructive sleep apnea: an evidence-based medicine review.  Arch Otolaryngol Head Neck Surg. 2006 Feb;132(2):206-13.  Otoniel YH1, Toni Y, Francisco ROCCO. The efficacy of anatomically based multilevel surgery for obstructive sleep apnea. Otolaryngol Head Neck Surg. 2003 Oct;129(4):327-35.  Isabel PALACIOS, Goldberg A. Hypopharyngeal Surgery in Obstructive Sleep Apnea: An Evidence-Based Medicine Review. Arch Otolaryngol Head Neck Surg. 2006 Feb;132(2):206-13.  Reece PJ et al. Upper-Airway Stimulation for Obstructive Sleep Apnea.  N Engl J Med. 2014 Jan 9;370(2):139-49.  Vira Y et al. Increased Incidence of Cardiovascular Disease in Middle-aged Men with Obstructive Sleep Apnea. Am J Respir Crit Care Med; 2002 166: 159-165  Mullins EM et al. Studying Life Effects and Effectiveness of Palatopharyngoplasty (SLEEP) study: Subjective Outcomes of Isolated Uvulopalatopharyngoplasty. Otolaryngol Head Neck Surg. 2011; 144: 623-631.        WHAT IF I ONLY HAVE SNORING?    Mandibular advancement devices, lateral sleep positioning, long-term weight loss and treatment of nasal allergies have been shown to improve snoring.  Exercising tongue muscles with a game (https://[employer]/us/dorita/soundly-reduce-snoring/nk0940234575) or stimulating the tongue during the day with a device (https://doi.org/10.3390/tfv49234817) have improved snoring in some  individuals.  https://www.[employer]/  https://www.sleepfoundation.org/best-anti-snoring-mouthpieces-and-mouthguards    Remember to Drive Safe... Drive Alive     Sleep health profoundly affects your health, mood, and your safety.  Thirty three percent of the population (one in three of us) is not getting enough sleep and many have a sleep disorder. Not getting enough sleep or having an untreated / undertreated sleep condition may make us sleepy without even knowing it. In fact, our driving could be dramatically impaired due to our sleep health. As your provider, here are some things I would like you to know about driving:     Here are some warning signs for impairment and dangerous drowsy driving:              -Having been awake more than 16 hours               -Looking tired               -Eyelid drooping              -Head nodding (it could be too late at this point)              -Driving for more than 30 minutes     Some things you could do to make the driving safer if you are experiencing some drowsiness:              -Stop driving and rest              -Call for transportation              -Make sure your sleep disorder is adequately treated     Some things that have been shown NOT to work when experiencing drowsiness while driving:              -Turning on the radio              -Opening windows              -Eating any  distracting  /  entertaining  foods (e.g., sunflower seeds, candy, or any other)              -Talking on the phone      Your decision may not only impact your life, but also the life of others. Please, remember to drive safe for yourself and all of us.

## 2024-10-30 NOTE — NURSING NOTE
"Chief Complaint   Patient presents with    Sleep Problem     Snoring. No other known symptoms.       Initial BP (!) 132/97   Pulse 94   Wt 91.4 kg (201 lb 6.4 oz)   LMP  (LMP Unknown)   SpO2 99%   BMI 29.96 kg/m   Estimated body mass index is 29.96 kg/m  as calculated from the following:    Height as of 10/3/24: 1.746 m (5' 8.75\").    Weight as of this encounter: 91.4 kg (201 lb 6.4 oz).    Medication Reconciliation: complete    Neck circumference: 15 inches / 38 centimeters.    ESS     ALDA [address]    [place]   "

## 2024-11-04 ENCOUNTER — TRANSFERRED RECORDS (OUTPATIENT)
Dept: HEALTH INFORMATION MANAGEMENT | Facility: CLINIC | Age: 49
End: 2024-11-04
Payer: COMMERCIAL

## 2024-11-13 ENCOUNTER — ANCILLARY PROCEDURE (OUTPATIENT)
Dept: MAMMOGRAPHY | Facility: CLINIC | Age: 49
End: 2024-11-13
Attending: STUDENT IN AN ORGANIZED HEALTH CARE EDUCATION/TRAINING PROGRAM
Payer: COMMERCIAL

## 2024-11-13 DIAGNOSIS — Z12.31 VISIT FOR SCREENING MAMMOGRAM: ICD-10-CM

## 2024-11-13 PROCEDURE — 77067 SCR MAMMO BI INCL CAD: CPT | Mod: TC | Performed by: RADIOLOGY

## 2024-11-13 PROCEDURE — 77063 BREAST TOMOSYNTHESIS BI: CPT | Mod: TC | Performed by: RADIOLOGY

## 2024-12-16 ENCOUNTER — TRANSFERRED RECORDS (OUTPATIENT)
Dept: HEALTH INFORMATION MANAGEMENT | Facility: CLINIC | Age: 49
End: 2024-12-16
Payer: COMMERCIAL

## 2025-01-13 ENCOUNTER — HOSPITAL ENCOUNTER (OUTPATIENT)
Dept: CT IMAGING | Facility: CLINIC | Age: 50
Discharge: HOME | End: 2025-01-13
Attending: OBSTETRICS & GYNECOLOGY | Admitting: OBSTETRICS & GYNECOLOGY
Payer: COMMERCIAL

## 2025-01-13 DIAGNOSIS — R97.1 ELEVATED CA-125: ICD-10-CM

## 2025-01-13 DIAGNOSIS — Z87.42 HISTORY OF ENDOMETRIOSIS: ICD-10-CM

## 2025-01-13 PROCEDURE — 250N000009 HC RX 250: Performed by: OBSTETRICS & GYNECOLOGY

## 2025-01-13 PROCEDURE — 250N000011 HC RX IP 250 OP 636: Performed by: OBSTETRICS & GYNECOLOGY

## 2025-01-13 PROCEDURE — 74177 CT ABD & PELVIS W/CONTRAST: CPT

## 2025-01-13 RX ORDER — IOPAMIDOL 755 MG/ML
98 INJECTION, SOLUTION INTRAVASCULAR ONCE
Status: COMPLETED | OUTPATIENT
Start: 2025-01-13 | End: 2025-01-13

## 2025-01-13 RX ADMIN — SODIUM CHLORIDE 68 ML: 9 INJECTION, SOLUTION INTRAVENOUS at 17:43

## 2025-01-13 RX ADMIN — IOPAMIDOL 98 ML: 755 INJECTION, SOLUTION INTRAVENOUS at 17:43

## 2025-01-16 ENCOUNTER — TRANSFERRED RECORDS (OUTPATIENT)
Dept: HEALTH INFORMATION MANAGEMENT | Facility: CLINIC | Age: 50
End: 2025-01-16
Payer: COMMERCIAL

## 2025-02-07 ASSESSMENT — ASTHMA QUESTIONNAIRES
QUESTION_1 LAST FOUR WEEKS HOW MUCH OF THE TIME DID YOUR ASTHMA KEEP YOU FROM GETTING AS MUCH DONE AT WORK, SCHOOL OR AT HOME: NONE OF THE TIME
ACT_TOTALSCORE: 25
QUESTION_2 LAST FOUR WEEKS HOW OFTEN HAVE YOU HAD SHORTNESS OF BREATH: NOT AT ALL
QUESTION_5 LAST FOUR WEEKS HOW WOULD YOU RATE YOUR ASTHMA CONTROL: COMPLETELY CONTROLLED
ACT_TOTALSCORE: 25
QUESTION_3 LAST FOUR WEEKS HOW OFTEN DID YOUR ASTHMA SYMPTOMS (WHEEZING, COUGHING, SHORTNESS OF BREATH, CHEST TIGHTNESS OR PAIN) WAKE YOU UP AT NIGHT OR EARLIER THAN USUAL IN THE MORNING: NOT AT ALL
QUESTION_4 LAST FOUR WEEKS HOW OFTEN HAVE YOU USED YOUR RESCUE INHALER OR NEBULIZER MEDICATION (SUCH AS ALBUTEROL): NOT AT ALL

## 2025-02-11 NOTE — CONSULTS
GYNECOLOGIC ONCOLOGY CONSULT    Patient Name: CRISTINA SULLIVAN Patient : 1975  Patient MRN: [id number]  Referring Physician: Trudy Carcamo MD  Primary GYNOncologist: Delphine Humphreys (Gynecological/Oncology)  Date of Service: 2025    Reason for Consult:  Treatment recommendations    History of Present Illness (Gyn Oncology):  Cristina Sullivan is a 49-year-old female who was incidentally identified to have 7 cm, simple-appearing right ovarian cystic mass and a 5 cm, mildly-complex, left ovarian cystic mass, on lumbar spine MRI 24, while undergoing work up for low back pain, now better visualized on most recent pelvic ultrasound and CT scan. CA-125 tumor marker is mildly-elevated at 112 u/mL. She presents today for treatment recommendations.    Clinical Course:  24: Cristina had a lumbar spine MRI for lumbar back pain that she noted during exercising and stretching, which showed multilevel mild neural foraminal stenosis. Slight contact of the foraminal to extraforaminal left L4 nerve by the foraminal and far lateral component of L4-L5 disc bulge. Mild L1-L2 and mild L5-S1 degenerative disc disease. Multilevel mild bilateral facet arthropathy. Mild chronic anterior wedging of the T11 vertebral body. Heterogeneous lesion with areas of intrinsic T1 hyperintensity, that appears to be arising from the left adnexa, with a larger T1 hyperintense lesion, that may arise from either the right adnexa or the uterus.  24: Pelvic ultrasounds showed endometrium measuring 8 mm. The IUD is in the correct position. Left ovary is enlarged slightly at 5.07 x 4.59 x 3.89 cm with continuing mass, measuring 4.48 x 2.92 cm. The right ovary is enlarged at 6.99 x 6.93 x 7.22 cm with a large area, measuring 6.93 x 6.54 cm, contiguous with the ovary itself. No obvious free fluid seen and small intramural fibroid noted.   = 112.  25: CT AP showed a 7 cm, simple-appearing right adnexal cystic mass and a 5 cm,  mildly-complex, left adnexal, cystic mass. These are nonspecific, but are most likely ovarian in origin. No other acute abnormality identified in the abdomen or pelvis.  Genetic Testing (Gyn Oncology):  N/A    Interval History (Gyn Oncology):  Cristina presents today for treatment recommendations. She is having persistent lumbar back pain since 1-2 weeks prior to Moustapha, which prompted the for lumbar MR, which showed 2 ovarian cysts, one on the left and one on the right. She denies any issues with bowel or bladder habits, nausea, vomiting, issues with eating or drinking. She denies any abdominal pain. She noted some back achiness on Saturday and felt like it needed to stretch on  morning and pain was severe, to the point that she was unable to sit without pain for 2 weeks. She was identified to have endometriosis when she had appendectomy in . She denies any hot flashes or night sweats.    Review of Systems:  A complete 14-point review of systems is negative except as noted in the above history of present illness.    Past Medical History:  Lumbar back pain  Abdominal pain  Neural foraminal stenosis  Mild DDD  Endometriosis  Abnormal uterine bleeding  Uterine fibroid  Bilateral ovarian masses  Obesity  Elevated CA-125 tumor marker  High blood pressure    Surgical History:  Mirena IUD placement for abnormal uterine bleeding 22  Laparoscopy with APPY 00  Colonoscopy 24    OB/Gyn History:  Menarche age: 13  .  x1. Son x1. First and only live birth in 2008  LMP: 4 years ago. No periods since Mirena IUD  Last mammogram 24, normal  Last Pap smear 23, NILM. No abnormal Pap smears  Last colonoscopy 24, normal  Denies history of HRT    Allergies#  No known medication allergies    Medications:  Amlodipine Oral 5 mg tablet daily  Losartan Oral 100 mg tablet daily  Metformin Oral 500 mg tablet daily  Bupropion (XL) Oral 24 hr Tab 150 mg tablet extended release 24 hr daily  Family  History:  Paternal grandmother - Ovarian cancer  Unknown history of 2 paternal aunts due to secrecy    Social History:    Sexually active  Lives in a house  Works as a patient coordinator at [employer]  Never smoker  4 glasses of weekly alcohol consumption  No illicit drug use    Health Maintenance:    Vital Signs:  Blood pressure: 143/97, Pulse: 105, Temperature: 98.4 F, Respirations: 18, O2 sat: 99%, Pain Scale: 0, Height: 69 in, Weight: 215.4 lb, BSA: 2.13, BMI: 31.81 kg/m2    Physical Exam (Gyn Oncology):  General: Alert and oriented x3, no acute distress. Obese.  HEENT: Normocephalic, atraumatic.  Lymph node exam: No supraclavicular, submandibular, or cervical lymphadenopathy.  CV: Regular rate and rhythm, no murmurs, rubs or gallops.  Respiratory: Clear to auscultation bilaterally, no wheezes, rales, or rhonchi.  Abdomen: Soft, non-tender to palpation, no rebound or guarding.  Lower Extremity Exam: No lower extremity edema, no palpable cords.  Neuro: Cranial nerves II-XII intact, gross motor skills intact.  Genitourinary exam: Deferred.    Laboratory Data:    Imagin24: MRI Lumbar Spine  IMPRESSION:  Multilevel mild neural foraminal stenosis. Slight contact of the foraminal to extraforaminal left L4 nerve by the foraminal and far lateral component of L4-L5 disc bulge. Mild L1-L2 and mild L5-S1 degenerative disc disease. Multilevel mild bilateral facet arthropathy. Mild chronic anterior wedging of the T11 vertebral body. Heterogeneous lesion with areas of intrinsic T1 hyperintensity, that appears to be arising from the left adnexa, with a larger T1 hyperintense lesion, that may arise from either the right adnexa or the uterus.    24: US Pelvis  IMPRESSION:  Endometrium measuring 8 mm. The IUD is in the correct position. Left ovary is enlarged slightly at 5.07 x 4.59 x 3.89 cm with continuing mass, measuring 4.48 x 2.92 cm. The right ovary is enlarged at 6.99 x 6.93 x 7.22 cm with a  large area, measuring 6.93 x 6.54 cm, contiguous with the ovary itself. No obvious free fluid seen and small intramural fibroid noted.    1/13/25: CT AP  IMPRESSION:  7 cm, simple-appearing right adnexal cystic mass and a 5 cm, mildly-complex, left adnexal, cystic mass. These are nonspecific, but are most likely ovarian in origin. No other acute abnormality identified in the abdomen or pelvis.    Problems:       Assessment & Plan (Gyn Oncology):  Cristina Eaton is a 49-year-old female who was incidentally identified to have 7 cm, simple-appearing right ovarian cystic mass and a 5 cm, mildly-complex, left ovarian cystic mass, on lumbar spine MRI 12/19/24, while undergoing work up for low back pain, now better visualized on most recent pelvic ultrasound and CT scan. CA-125 tumor marker is mildly-elevated at 112 u/mL. She presents today for treatment recommendations.  Bilateral ovarian cystic masses, measuring 7 cm on the right and 5 cm on the left - Cristina reports a history of endometriosis diagnosed at the time of her appendectomy at around age 24. However, it is somewhat curious that this pain came on relatively abruptly, earlier this winter and with her mildly elevated CA-125 tumor marker and a paternal grandmother, who had a history of ovarian cancer, we did discuss the need to proceed with a robotic-assisted total laparoscopic hysterectomy with bilateral salpingooophorectomy and intraoperative frozen section analysis, to evaluate the underlying etiology behind the development of the bilateral ovarian masses. While the right ovarian cystic lesion appears simple in nature, the left is mildly complex. We did discuss that performing a hysterectomy at the time of bilateral salpingooophorectomy will ultimately be therapeutic, if endometriosis is an underlying ongoing process, but also will help with postoperative hormone replacement therapy and negate the need for additional progesterone therapy with hormone replacement.  We intraoperative surgical staging, could include lymph node biopsy along the aorta and in the pelvis and also discussed the possible need for an omental biopsy. We reviewed the risks of surgery, including bleeding or infection and potential damage to surrounding structures, including the bowel, bladder, and bilateral ureters. We reviewed general perioperative expectations and also discussed the need for preoperative history and physical. I answered all of Cristina's questions to the best of my ability.  Thank you very much for allowing me to take part in the care of this very sweet patient.    3 minutes in reviewing records, 28 minutes in discussion, 2 minutes ordering labs.    Pain Care Management:  Pain Scale: 0    Patient Care needs:  Depressions Status: Was screened; Outcome positive: No; Screening Date: 01/16/2025; Screening Tool: PRIME MD-PHQ2; Total depression score: 3  Smoking Status: Smoking Tobacco : Never smoker; Smokeless Tobacco : Never used smokeless tobacco; Vaping : Never vaped  Documentation assistance provided by vida Cutlering for Dr. Delphine Humphreys, on 1/16/2025.  I, Dr. Delphine Humphreys, personally performed the services described in this documentation, and it is both accurate and complete.    Delphine Humphreys MD  Phone: [phone]  Fax: [phone]

## 2025-02-12 ENCOUNTER — OFFICE VISIT (OUTPATIENT)
Dept: PEDIATRICS | Facility: CLINIC | Age: 50
End: 2025-02-12
Payer: COMMERCIAL

## 2025-02-12 VITALS
TEMPERATURE: 98 F | HEIGHT: 69 IN | BODY MASS INDEX: 31.1 KG/M2 | RESPIRATION RATE: 16 BRPM | HEART RATE: 94 BPM | SYSTOLIC BLOOD PRESSURE: 120 MMHG | WEIGHT: 210 LBS | DIASTOLIC BLOOD PRESSURE: 88 MMHG | OXYGEN SATURATION: 99 %

## 2025-02-12 DIAGNOSIS — E88.810 METABOLIC SYNDROME: ICD-10-CM

## 2025-02-12 DIAGNOSIS — F32.A ANXIETY AND DEPRESSION: ICD-10-CM

## 2025-02-12 DIAGNOSIS — I10 BENIGN ESSENTIAL HYPERTENSION: ICD-10-CM

## 2025-02-12 DIAGNOSIS — Z01.818 PREOP GENERAL PHYSICAL EXAM: Primary | ICD-10-CM

## 2025-02-12 DIAGNOSIS — E66.811 CLASS 1 OBESITY WITH BODY MASS INDEX (BMI) OF 33.0 TO 33.9 IN ADULT, UNSPECIFIED OBESITY TYPE, UNSPECIFIED WHETHER SERIOUS COMORBIDITY PRESENT: ICD-10-CM

## 2025-02-12 DIAGNOSIS — E88.819 INSULIN RESISTANCE: ICD-10-CM

## 2025-02-12 DIAGNOSIS — F41.9 ANXIETY AND DEPRESSION: ICD-10-CM

## 2025-02-12 DIAGNOSIS — F41.1 ANXIETY STATE: ICD-10-CM

## 2025-02-12 DIAGNOSIS — N83.8 BILATERAL TUBO-OVARIAN MASS: ICD-10-CM

## 2025-02-12 LAB
ERYTHROCYTE [DISTWIDTH] IN BLOOD BY AUTOMATED COUNT: 13.2 % (ref 10–15)
HCT VFR BLD AUTO: 41.5 % (ref 35–47)
HGB BLD-MCNC: 13.9 G/DL (ref 11.7–15.7)
MCH RBC QN AUTO: 29.9 PG (ref 26.5–33)
MCHC RBC AUTO-ENTMCNC: 33.5 G/DL (ref 31.5–36.5)
MCV RBC AUTO: 89 FL (ref 78–100)
PLATELET # BLD AUTO: 307 10E3/UL (ref 150–450)
RBC # BLD AUTO: 4.65 10E6/UL (ref 3.8–5.2)
WBC # BLD AUTO: 6.8 10E3/UL (ref 4–11)

## 2025-02-12 RX ORDER — LOSARTAN POTASSIUM 100 MG/1
100 TABLET ORAL DAILY
Qty: 90 TABLET | Refills: 4 | Status: SHIPPED | OUTPATIENT
Start: 2025-02-12

## 2025-02-12 RX ORDER — BUPROPION HYDROCHLORIDE 150 MG/1
150 TABLET ORAL EVERY MORNING
Qty: 90 TABLET | Refills: 4 | Status: SHIPPED | OUTPATIENT
Start: 2025-02-12

## 2025-02-12 RX ORDER — METFORMIN HYDROCHLORIDE 500 MG/1
500 TABLET, EXTENDED RELEASE ORAL 2 TIMES DAILY WITH MEALS
Qty: 180 TABLET | Refills: 3 | Status: SHIPPED | OUTPATIENT
Start: 2025-02-12

## 2025-02-12 RX ORDER — AMLODIPINE BESYLATE 5 MG/1
5 TABLET ORAL DAILY
Qty: 90 TABLET | Refills: 4 | Status: SHIPPED | OUTPATIENT
Start: 2025-02-12

## 2025-02-12 ASSESSMENT — PAIN SCALES - GENERAL: PAINLEVEL_OUTOF10: NO PAIN (0)

## 2025-02-12 NOTE — PROGRESS NOTES
Preoperative Evaluation  [facility]  [address]  [address]  [place] [address]  Phone: [phone]  Fax: [phone]  Primary Provider: Deirdre E. Milligan, MD  Pre-op Performing Provider: Eloina Gar MD  Feb 12, 2025 2/7/2025   Surgical Information   What procedure is being done? Total hysterectomy oophrectomy possible staging   Facility or Hospital where procedure/surgery will be performed: [facility]   Who is doing the procedure / surgery? Sweta Humphreys   Date of surgery / procedure: 2/18/25   Time of surgery / procedure: 5:30 check in 7:30 surgery   Where do you plan to recover after surgery? at home with family     Fax number for surgical facility: Note does not need to be faxed, will be available electronically in Epic.    Assessment & Plan     The proposed surgical procedure is considered INTERMEDIATE risk.    (Z01.818) Preop general physical exam  (primary encounter diagnosis)  (N83.8) Bilateral tubo-ovarian mass  Comment: Routine pre-op visit for planned salpingo-oopherectomy with staging for bilateral ovarian mass of unclear origin. Patient questions reviewed - no additional risk identified. Does have history of asthma but has not required inhaler for > 2 years. No bleeding or clotting disorders. Will obtain routine screening labs for patient with HTN, ARB use.   Plan: HCG qualitative urine, CBC with platelets,         Basic metabolic panel  (Ca, Cl, CO2, Creat,         Gluc, K, Na, BUN)        (E88.819) Insulin resistance  Comment: Controlled with metformin. Last A1c 5.2, NO diagnosis of DM. Refill med today   Plan: metFORMIN (GLUCOPHAGE XR) 500 MG 24 hr tablet    (E66.811,  Z68.33) Class 1 obesity with body mass index (BMI) of 33.0 to 33.9 in adult, unspecified obesity type, unspecified whether serious comorbidity present  (E88.810) Metabolic syndrome  Comment: Follows with PCP. Stable - medication refilled today   Plan: metFORMIN (GLUCOPHAGE XR)  500 MG 24 hr table      (I10) Benign essential hypertension  Comment: Well controlled with losartan and amlodipine. Reviewed stop times.   Plan: losartan (COZAAR) 100 MG tablet, amLODIPine         (NORVASC) 5 MG tablet    (F41.1) Anxiety state  Comment: Some increased nerves with upcoming surgery, otherwise well controlled. No adverse effects from medication   Plan: buPROPion (WELLBUTRIN XL) 150 MG 24 hr tablet    (F41.9,  F32.A) Anxiety and depression  Comment: As above.   Plan: buPROPion (WELLBUTRIN XL) 150 MG 24 hr tablet      Risks and Recommendations  The patient has the following additional risks and recommendations for perioperative complications:   - No identified additional risk factors other than previously addressed    Preoperative Medication Instructions  Antiplatelet or Anticoagulation Medication Instructions   - We reviewed the medication list and the patient is not on an antiplatelet or anticoagulation medications.    Additional Medication Instructions   - ACE/ARB/ARNI (lisinopril, enalapril, losartan, valsartan, olmesartan, sacubritril/valsartan) : DO NOT TAKE on day of surgery (minimum 11 hours for general anesthesia).   - Calcium Channel Blockers (amlodipine, diltiazem, felodipine): May be continued on the day of surgery.   - metformin: DO NOT TAKE day of surgery.   - ibuprofen (Advil, Motrin): DO NOT TAKE 1 day before surgery.    - SSRIs, SNRIs, TCAs, Antipsychotics: Continue without modification.     Recommendation  Approval given to proceed with proposed procedure pending review of diagnostic evaluation.    Aniyah Evans is a 50 year old, presenting for the following:  Pre-Op Exam        2/12/2025    10:46 AM   Additional Questions   Roomed by aHrriet CARRASQUILLO CMA   Accompanied by Self         2/12/2025    10:46 AM   Patient Reported Additional Medications   Patient reports taking the following new medications n/a     HPI related to upcoming procedure:      50 year female with recent diagnosis of  bilateral ovarian mass. Pre-op for hys        2/7/2025   Pre-Op Questionnaire   Have you ever had a heart attack or stroke? No   Have you ever had surgery on your heart or blood vessels, such as a stent placement, a coronary artery bypass, or surgery on an artery in your head, neck, heart, or legs? No   Do you have chest pain with activity? No   Do you have a history of heart failure? No   Do you currently have a cold, bronchitis or symptoms of other infection? No   Do you have a cough, shortness of breath, or wheezing? No   Do you or anyone in your family have previous history of blood clots? No   Do you or does anyone in your family have a serious bleeding problem such as prolonged bleeding following surgeries or cuts? No   Have you ever had problems with anemia or been told to take iron pills? No   Have you had any abnormal blood loss such as black, tarry or bloody stools, or abnormal vaginal bleeding? No   Have you ever had a blood transfusion? No   Are you willing to have a blood transfusion if it is medically needed before, during, or after your surgery? Yes   Have you or any of your relatives ever had problems with anesthesia? No - prior appendectomy in 20s, no issues    Do you have sleep apnea, excessive snoring or daytime drowsiness? No   Do you have any artifical heart valves or other implanted medical devices like a pacemaker, defibrillator, or continuous glucose monitor? No   Do you have artificial joints? No   Are you allergic to latex? No     Health Care Directive  Patient does not have a Health Care Directive: Proxy is  Nato Eaton     Preoperative Review of    reviewed - no record of controlled substances prescribed.      Status of Chronic Conditions:  DEPRESSION - Patient has a long history of Depression of moderate severity requiring medication for control with recent symptoms being stable..Current symptoms of depression include none.     HYPERTENSION - Patient has longstanding  history of HTN , currently denies any symptoms referable to elevated blood pressure. Specifically denies chest pain, palpitations, dyspnea, orthopnea, PND or peripheral edema. Blood pressure readings have been in normal range. Current medication regimen is as listed below. Patient denies any side effects of medication.     Patient Active Problem List    Diagnosis Date Noted    Nutritional counseling 04/06/2023     Priority: Medium    Asthma 01/26/2023     Priority: Medium    Gastroesophageal reflux disease without esophagitis 01/26/2023     Priority: Medium    Morbid obesity (H) 01/26/2023     Priority: Medium    Knee pain 01/26/2023     Priority: Medium    IUD (intrauterine device) in place 10/06/2021     Priority: Medium    Menorrhagia with regular cycle 09/29/2021     Priority: Medium    Obesity 07/08/2021     Priority: Medium    Anxiety Disorder NOS      Priority: Medium     Created by Conversion  Replacement Utility updated for latest IMO load      Formatting of this note might be different from the original.  Created by Conversion    Replacement Utility updated for latest IMO load      Irritable bowel syndrome      Priority: Medium     Created by Conversion      Formatting of this note might be different from the original.  Created by Conversion        Past Medical History:   Diagnosis Date    Anxiety 2009    Asthma     Gastroesophageal reflux disease without esophagitis     Knee pain     Menorrhagia with regular cycle 09/29/2021    Obesity      Past Surgical History:   Procedure Laterality Date    LAPAROSCOPIC APPENDECTOMY  1996    Ruptured, did require inpatient IV Abx for several days    TONSILLECTOMY      WISDOM TOOTH EXTRACTION       Current Outpatient Medications   Medication Sig Dispense Refill    albuterol (PROAIR HFA/PROVENTIL HFA/VENTOLIN HFA) 108 (90 Base) MCG/ACT inhaler Inhale 2 puffs into the lungs every 6 hours as needed for shortness of breath or wheezing 18 g 3    amLODIPine (NORVASC) 5 MG  "tablet Take 1 tablet (5 mg) by mouth daily. 90 tablet 4    buPROPion (WELLBUTRIN XL) 150 MG 24 hr tablet Take 1 tablet (150 mg) by mouth every morning. 90 tablet 4    levonorgestrel (MIRENA) 20 MCG/24HR IUD 1 each (20 mcg) by Intrauterine route once      losartan (COZAAR) 100 MG tablet Take 1 tablet (100 mg) by mouth daily. 90 tablet 4    metFORMIN (GLUCOPHAGE XR) 500 MG 24 hr tablet Take 1 tablet (500 mg) by mouth 2 times daily (with meals). 180 tablet 3    Semaglutide-Weight Management (WEGOVY) 2.4 MG/0.75ML pen Inject 2.4 mg subcutaneously once a week. Do not start before September 23, 2024. 9 mL 3     No Known Allergies     Social History     Tobacco Use    Smoking status: Never     Passive exposure: Never    Smokeless tobacco: Never   Substance Use Topics    Alcohol use: Not Currently     Alcohol/week: 0.0 - 21.0 standard drinks of alcohol     Comment: social     Family History   Problem Relation Age of Onset    No Known Problems Mother     Obesity Father     Hypertension Father     Anuerysm Father         a fib, aortic anuerysm    Sleep Apnea Father     Tremor Sister     No Known Problems Sister     No Known Problems Brother     No Known Problems Maternal Grandmother     Endometrial Cancer Maternal Grandfather     No Known Problems Paternal Grandmother     No Known Problems Paternal Grandfather     No Known Problems Daughter     Breast Cancer Maternal Aunt     Breast Cancer Maternal Aunt 38    No Known Problems Paternal Aunt     Hereditary Breast and Ovarian Cancer Syndrome No family hx of     Cancer No family hx of     Colon Cancer No family hx of     Ovarian Cancer No family hx of      History   Drug Use No           Review of Systems  Constitutional, HEENT, cardiovascular, pulmonary, gi and gu systems are negative, except as otherwise noted.    Objective    /88 (BP Location: Right arm, Patient Position: Sitting)   Pulse 94   Temp 98  F (36.7  C) (Tympanic)   Resp 16   Ht 1.74 m (5' 8.5\")   Wt " "95.3 kg (210 lb)   SpO2 99%   BMI 31.46 kg/m     Estimated body mass index is 31.46 kg/m  as calculated from the following:    Height as of this encounter: 1.74 m (5' 8.5\").    Weight as of this encounter: 95.3 kg (210 lb).      Physical Exam  GENERAL: alert and no distress  EYES: Eyes grossly normal to inspection, PERRL and conjunctivae and sclerae normal  HENT: normal cephalic/atraumatic, oropharynx clear, and oral mucous membranes moist  NECK: no adenopathy, no asymmetry, masses, or scars  RESP: lungs clear to auscultation - no rales, rhonchi or wheezes  CV: regular rate and rhythm, normal S1 S2, no S3 or S4, no murmur, click or rub, no peripheral edema  ABDOMEN: soft, nontender, no hepatosplenomegaly, no masses and bowel sounds normal  MS: no gross musculoskeletal defects noted, no edema  SKIN: no suspicious lesions or rashes  NEURO: Normal strength and tone, mentation intact and speech normal  PSYCH: mentation appears normal and affect appropriate for situation    Recent Labs   Lab Test 03/26/24  1524 03/17/24  1138   HGB  --  14.5   PLT  --  260   INR  --  0.93    137   POTASSIUM 3.7 3.3*   CR 0.80 0.80      Diagnostics  Recent Results (from the past 24 hours)   CBC with platelets    Collection Time: 02/12/25 12:24 PM   Result Value Ref Range    WBC Count 6.8 4.0 - 11.0 10e3/uL    RBC Count 4.65 3.80 - 5.20 10e6/uL    Hemoglobin 13.9 11.7 - 15.7 g/dL    Hematocrit 41.5 35.0 - 47.0 %    MCV 89 78 - 100 fL    MCH 29.9 26.5 - 33.0 pg    MCHC 33.5 31.5 - 36.5 g/dL    RDW 13.2 10.0 - 15.0 %    Platelet Count 307 150 - 450 10e3/uL      No EKG required, no history of coronary heart disease, significant arrhythmia, peripheral arterial disease or other structural heart disease.    Revised Cardiac Risk Index (RCRI)  The patient has the following serious cardiovascular risks for perioperative complications:   - No serious cardiac risks = 0 points     RCRI Interpretation: 0 points: Class I (very low risk - 0.4% " complication rate)         Signed Electronically by: Eloina Gar MD    I have seen the patient, discussed with the resident and agree with the history, physical and plan as documented above.    Trice Almaraz MD  Internal Medicine - Pediatrics    A copy of this evaluation report is provided to the requesting physician.

## 2025-02-12 NOTE — PATIENT INSTRUCTIONS
How to Take Your Medication Before Surgery  Preoperative Medication Instructions   Antiplatelet or Anticoagulation Medication Instructions   - We reviewed the medication list and the patient is not on an antiplatelet or anticoagulation medications.    Additional Medication Instructions   - ACE/ARB/ARNI (lisinopril, enalapril, losartan, valsartan, olmesartan, sacubritril/valsartan) : DO NOT TAKE on day of surgery (minimum 11 hours for general anesthesia).   - Calcium Channel Blockers (amlodipine, diltiazem, felodipine): May be continued on the day of surgery.   - metformin: DO NOT TAKE day of surgery.   - ibuprofen (Advil, Motrin): DO NOT TAKE 1 day before surgery.    - SSRIs, SNRIs, TCAs, Antipsychotics: Continue without modification.        Take amlodipine morning of surgery - do not take any other medications morning of surgery.     Patient Education   Preparing for Your Surgery  For Adults  Getting started  In most cases, a nurse will call to review your health history and instructions. They will give you an arrival time based on your scheduled surgery time. Please be ready to share:  Your doctor's clinic name and phone number  Your medical, surgical, and anesthesia history  A list of allergies and sensitivities  A list of medicines, including herbal treatments and over-the-counter drugs  Whether the patient has a legal guardian (ask how to send us the papers in advance)  Note: You may not receive a call if you were seen at our PAC (Preoperative Assessment Center).  Please tell us if you're pregnant--or if there's any chance you might be pregnant. Some surgeries may injure a fetus (unborn baby), so they require a pregnancy test. Surgeries that are safe for a fetus don't always need a test, and you can choose whether to have one.   Preparing for surgery  Within 10 to 30 days of surgery: Have a pre-op exam (sometimes called an H&P, or History and Physical). This can be done at a clinic or pre-operative center.  If  you're having a , you may not need this exam. Talk to your care team.  At your pre-op exam, talk to your care team about all medicines you take. (This includes CBD oil and any drugs, such as THC, marijuana, and other forms of cannabis.) If you need to stop any medicine before surgery, ask when to start taking it again.  This is for your safety. Many medicines and drugs can make you bleed too much during surgery. Some change how well surgery (anesthesia) drugs work.  Call your insurance company to let them know you're having surgery. (If you don't have insurance, call [phone].)  Call your clinic if there's any change in your health. This includes a scrape or scratch near the surgery site, or any signs of a cold (sore throat, runny nose, cough, rash, fever).  Eating and drinking guidelines  For your safety: Unless your surgeon tells you otherwise, follow the guidelines below.  Eat and drink as normal until 8 hours before you arrive for surgery. After that, no food or milk. You can spit out gum when you arrive.  Drink clear liquids until 2 hours before you arrive. These are liquids you can see through, like water, Gatorade, and Propel Water. They also include plain black coffee and tea (no cream or milk).  No alcohol for 24 hours before you arrive. The night before surgery, stop any drinks that contain THC.  If your care team tells you to take medicine on the morning of surgery, it's okay to take it with a sip of water. No other medicines or drugs are allowed (including CBD oil)--follow your care team's instructions.  If you have questions the day of surgery, call your hospital or surgery center.   Preventing infection  Shower or bathe the night before and the morning of surgery. Follow the instructions your clinic gave you. (If no instructions, use regular soap.)  Don't shave or clip hair near your surgery site. We'll remove the hair if needed.  Don't smoke or vape the morning of surgery. No chewing  tobacco for 6 hours before you arrive. A nicotine patch is okay. You may spit out nicotine gum when you arrive.  For some surgeries, the surgeon will tell you to fully quit smoking and nicotine.  We will make every effort to keep you safe from infection. We will:  Clean our hands often with soap and water (or an alcohol-based hand rub).  Clean the skin at your surgery site with a special soap that kills germs.  Give you a special gown to keep you warm. (Cold raises the risk of infection.)  Wear hair covers, masks, gowns, and gloves during surgery.  Give antibiotic medicine, if prescribed. Not all surgeries need this medicine.  What to bring on the day of surgery  Photo ID and insurance card  Copy of your health care directive, if you have one  Glasses and hearing aids (bring cases)  You can't wear contacts during surgery  Inhaler and eye drops, if you use them (tell us about these when you arrive)  CPAP machine or breathing device, if you use them  A few personal items, if spending the night  If you have . . .  A pacemaker, ICD (cardiac defibrillator), or other implant: Bring the ID card.  An implanted stimulator: Bring the remote control.  A legal guardian: Bring a copy of the certified (court-stamped) guardianship papers.  Please remove any jewelry, including body piercings. Leave jewelry and other valuables at home.  If you're going home the day of surgery  You must have a responsible adult drive you home. They should stay with you overnight as well.  If you don't have someone to stay with you, and you aren't safe to go home alone, we may keep you overnight. Insurance often won't pay for this.  After surgery  If it's hard to control your pain or you need more pain medicine, please call your surgeon's office.  Questions?   If you have any questions for your care team, list them here:    ____________________________________________________________________________________________________________________________________________________________________________________________________________________________________________________________  For informational purposes only. Not to replace the advice of your health care provider. Copyright   2003, 2019 [facility]. All rights reserved. Clinically reviewed by Adarsh Nino MD. [employer] [id number] - REV 08/24.

## 2025-02-13 LAB
ANION GAP SERPL CALCULATED.3IONS-SCNC: 11 MMOL/L (ref 7–15)
BUN SERPL-MCNC: 10.4 MG/DL (ref 6–20)
CALCIUM SERPL-MCNC: 9.2 MG/DL (ref 8.8–10.4)
CHLORIDE SERPL-SCNC: 105 MMOL/L (ref 98–107)
CREAT SERPL-MCNC: 0.82 MG/DL (ref 0.51–0.95)
EGFRCR SERPLBLD CKD-EPI 2021: 87 ML/MIN/1.73M2
GLUCOSE SERPL-MCNC: 90 MG/DL (ref 70–99)
HCO3 SERPL-SCNC: 22 MMOL/L (ref 22–29)
POTASSIUM SERPL-SCNC: 4.2 MMOL/L (ref 3.4–5.3)
SODIUM SERPL-SCNC: 138 MMOL/L (ref 135–145)

## 2025-02-17 ENCOUNTER — ANESTHESIA EVENT (OUTPATIENT)
Dept: SURGERY | Facility: HOSPITAL | Age: 50
End: 2025-02-17
Payer: COMMERCIAL

## 2025-02-18 ENCOUNTER — HOSPITAL ENCOUNTER (OUTPATIENT)
Facility: HOSPITAL | Age: 50
Discharge: HOME | End: 2025-02-19
Attending: OBSTETRICS & GYNECOLOGY | Admitting: OBSTETRICS & GYNECOLOGY
Payer: COMMERCIAL

## 2025-02-18 ENCOUNTER — ANESTHESIA (OUTPATIENT)
Dept: SURGERY | Facility: HOSPITAL | Age: 50
End: 2025-02-18
Payer: COMMERCIAL

## 2025-02-18 DIAGNOSIS — Z90.710 S/P HYSTERECTOMY WITH OOPHORECTOMY: Primary | ICD-10-CM

## 2025-02-18 DIAGNOSIS — Z90.721 S/P HYSTERECTOMY WITH OOPHORECTOMY: Primary | ICD-10-CM

## 2025-02-18 PROBLEM — R19.00 PELVIC MASS: Status: ACTIVE | Noted: 2025-02-18

## 2025-02-18 LAB
HCG UR QL: NEGATIVE
PATH REPORT.COMMENTS IMP SPEC: NORMAL
PATH REPORT.INTRAOP OBS SPEC DOC: NORMAL

## 2025-02-18 PROCEDURE — 250N000011 HC RX IP 250 OP 636: Mod: JZ | Performed by: PHYSICIAN ASSISTANT

## 2025-02-18 PROCEDURE — 250N000013 HC RX MED GY IP 250 OP 250 PS 637: Performed by: STUDENT IN AN ORGANIZED HEALTH CARE EDUCATION/TRAINING PROGRAM

## 2025-02-18 PROCEDURE — 360N000080 HC SURGERY LEVEL 7, PER MIN: Performed by: OBSTETRICS & GYNECOLOGY

## 2025-02-18 PROCEDURE — 272N000001 HC OR GENERAL SUPPLY STERILE: Performed by: OBSTETRICS & GYNECOLOGY

## 2025-02-18 PROCEDURE — 88331 PATH CONSLTJ SURG 1 BLK 1SPC: CPT | Mod: 26 | Performed by: PATHOLOGY

## 2025-02-18 PROCEDURE — 250N000025 HC SEVOFLURANE, PER MIN: Performed by: OBSTETRICS & GYNECOLOGY

## 2025-02-18 PROCEDURE — 370N000017 HC ANESTHESIA TECHNICAL FEE, PER MIN: Performed by: OBSTETRICS & GYNECOLOGY

## 2025-02-18 PROCEDURE — 258N000003 HC RX IP 258 OP 636: Performed by: PAIN MEDICINE

## 2025-02-18 PROCEDURE — 250N000011 HC RX IP 250 OP 636: Performed by: ANESTHESIOLOGY

## 2025-02-18 PROCEDURE — 88305 TISSUE EXAM BY PATHOLOGIST: CPT | Mod: TC | Performed by: OBSTETRICS & GYNECOLOGY

## 2025-02-18 PROCEDURE — 710N000009 HC RECOVERY PHASE 1, LEVEL 1, PER MIN: Performed by: OBSTETRICS & GYNECOLOGY

## 2025-02-18 PROCEDURE — 250N000013 HC RX MED GY IP 250 OP 250 PS 637: Performed by: PHYSICIAN ASSISTANT

## 2025-02-18 PROCEDURE — 250N000011 HC RX IP 250 OP 636: Performed by: PHYSICIAN ASSISTANT

## 2025-02-18 PROCEDURE — 250N000009 HC RX 250: Performed by: OBSTETRICS & GYNECOLOGY

## 2025-02-18 PROCEDURE — 250N000011 HC RX IP 250 OP 636: Performed by: STUDENT IN AN ORGANIZED HEALTH CARE EDUCATION/TRAINING PROGRAM

## 2025-02-18 PROCEDURE — 999N000141 HC STATISTIC PRE-PROCEDURE NURSING ASSESSMENT: Performed by: OBSTETRICS & GYNECOLOGY

## 2025-02-18 PROCEDURE — 258N000001 HC RX 258: Performed by: OBSTETRICS & GYNECOLOGY

## 2025-02-18 PROCEDURE — 710N000012 HC RECOVERY PHASE 2, PER MINUTE: Performed by: OBSTETRICS & GYNECOLOGY

## 2025-02-18 PROCEDURE — 88331 PATH CONSLTJ SURG 1 BLK 1SPC: CPT | Mod: TC | Performed by: OBSTETRICS & GYNECOLOGY

## 2025-02-18 PROCEDURE — 88307 TISSUE EXAM BY PATHOLOGIST: CPT | Mod: 26 | Performed by: PATHOLOGY

## 2025-02-18 PROCEDURE — 88305 TISSUE EXAM BY PATHOLOGIST: CPT | Mod: 26 | Performed by: PATHOLOGY

## 2025-02-18 PROCEDURE — 88332 PATH CONSLTJ SURG EA ADD BLK: CPT | Mod: 26 | Performed by: PATHOLOGY

## 2025-02-18 PROCEDURE — 250N000009 HC RX 250: Performed by: STUDENT IN AN ORGANIZED HEALTH CARE EDUCATION/TRAINING PROGRAM

## 2025-02-18 PROCEDURE — 258N000003 HC RX IP 258 OP 636: Performed by: STUDENT IN AN ORGANIZED HEALTH CARE EDUCATION/TRAINING PROGRAM

## 2025-02-18 PROCEDURE — 81025 URINE PREGNANCY TEST: CPT | Performed by: PHYSICIAN ASSISTANT

## 2025-02-18 PROCEDURE — 88112 CYTOPATH CELL ENHANCE TECH: CPT | Mod: 26 | Performed by: PATHOLOGY

## 2025-02-18 RX ORDER — SODIUM CHLORIDE, SODIUM LACTATE, POTASSIUM CHLORIDE, CALCIUM CHLORIDE 600; 310; 30; 20 MG/100ML; MG/100ML; MG/100ML; MG/100ML
INJECTION, SOLUTION INTRAVENOUS CONTINUOUS PRN
Status: DISCONTINUED | OUTPATIENT
Start: 2025-02-18 | End: 2025-02-18

## 2025-02-18 RX ORDER — LIDOCAINE HYDROCHLORIDE 10 MG/ML
INJECTION, SOLUTION INFILTRATION; PERINEURAL PRN
Status: DISCONTINUED | OUTPATIENT
Start: 2025-02-18 | End: 2025-02-18

## 2025-02-18 RX ORDER — ONDANSETRON 2 MG/ML
4 INJECTION INTRAMUSCULAR; INTRAVENOUS EVERY 30 MIN PRN
Status: DISCONTINUED | OUTPATIENT
Start: 2025-02-18 | End: 2025-02-18 | Stop reason: HOSPADM

## 2025-02-18 RX ORDER — KETOROLAC TROMETHAMINE 30 MG/ML
INJECTION, SOLUTION INTRAMUSCULAR; INTRAVENOUS PRN
Status: DISCONTINUED | OUTPATIENT
Start: 2025-02-18 | End: 2025-02-18

## 2025-02-18 RX ORDER — NALOXONE HYDROCHLORIDE 0.4 MG/ML
0.4 INJECTION, SOLUTION INTRAMUSCULAR; INTRAVENOUS; SUBCUTANEOUS
Status: DISCONTINUED | OUTPATIENT
Start: 2025-02-18 | End: 2025-02-19 | Stop reason: HOSPADM

## 2025-02-18 RX ORDER — ALBUTEROL SULFATE 90 UG/1
INHALANT RESPIRATORY (INHALATION) PRN
Status: DISCONTINUED | OUTPATIENT
Start: 2025-02-18 | End: 2025-02-18

## 2025-02-18 RX ORDER — CEFAZOLIN SODIUM/WATER 2 G/20 ML
2 SYRINGE (ML) INTRAVENOUS SEE ADMIN INSTRUCTIONS
Status: DISCONTINUED | OUTPATIENT
Start: 2025-02-18 | End: 2025-02-18 | Stop reason: HOSPADM

## 2025-02-18 RX ORDER — OXYCODONE HYDROCHLORIDE 5 MG/1
5 TABLET ORAL EVERY 4 HOURS PRN
Qty: 8 TABLET | Refills: 0 | Status: SHIPPED | OUTPATIENT
Start: 2025-02-18 | End: 2025-02-21

## 2025-02-18 RX ORDER — ONDANSETRON 2 MG/ML
4 INJECTION INTRAMUSCULAR; INTRAVENOUS EVERY 6 HOURS PRN
Status: DISCONTINUED | OUTPATIENT
Start: 2025-02-18 | End: 2025-02-19 | Stop reason: HOSPADM

## 2025-02-18 RX ORDER — BUPROPION HYDROCHLORIDE 150 MG/1
150 TABLET ORAL EVERY MORNING
Status: DISCONTINUED | OUTPATIENT
Start: 2025-02-18 | End: 2025-02-19 | Stop reason: HOSPADM

## 2025-02-18 RX ORDER — WATER 10 ML/10ML
INJECTION INTRAMUSCULAR; INTRAVENOUS; SUBCUTANEOUS PRN
Status: DISCONTINUED | OUTPATIENT
Start: 2025-02-18 | End: 2025-02-18 | Stop reason: HOSPADM

## 2025-02-18 RX ORDER — DEXAMETHASONE SODIUM PHOSPHATE 10 MG/ML
4 INJECTION, SOLUTION INTRAMUSCULAR; INTRAVENOUS
Status: CANCELLED | OUTPATIENT
Start: 2025-02-18

## 2025-02-18 RX ORDER — DEXAMETHASONE SODIUM PHOSPHATE 10 MG/ML
INJECTION, SOLUTION INTRAMUSCULAR; INTRAVENOUS PRN
Status: DISCONTINUED | OUTPATIENT
Start: 2025-02-18 | End: 2025-02-18

## 2025-02-18 RX ORDER — HYDROMORPHONE HCL IN WATER/PF 6 MG/30 ML
0.4 PATIENT CONTROLLED ANALGESIA SYRINGE INTRAVENOUS
Status: DISCONTINUED | OUTPATIENT
Start: 2025-02-18 | End: 2025-02-19 | Stop reason: HOSPADM

## 2025-02-18 RX ORDER — BUPIVACAINE HYDROCHLORIDE AND EPINEPHRINE 2.5; 5 MG/ML; UG/ML
INJECTION, SOLUTION EPIDURAL; INFILTRATION; INTRACAUDAL; PERINEURAL PRN
Status: DISCONTINUED | OUTPATIENT
Start: 2025-02-18 | End: 2025-02-18 | Stop reason: HOSPADM

## 2025-02-18 RX ORDER — OXYCODONE HYDROCHLORIDE 5 MG/1
10 TABLET ORAL
Status: CANCELLED | OUTPATIENT
Start: 2025-02-18

## 2025-02-18 RX ORDER — CEFAZOLIN SODIUM/WATER 2 G/20 ML
2 SYRINGE (ML) INTRAVENOUS
Status: COMPLETED | OUTPATIENT
Start: 2025-02-18 | End: 2025-02-18

## 2025-02-18 RX ORDER — OXYCODONE HYDROCHLORIDE 5 MG/1
5 TABLET ORAL EVERY 4 HOURS PRN
Status: DISCONTINUED | OUTPATIENT
Start: 2025-02-18 | End: 2025-02-19 | Stop reason: HOSPADM

## 2025-02-18 RX ORDER — ALBUTEROL SULFATE 90 UG/1
2 INHALANT RESPIRATORY (INHALATION) EVERY 6 HOURS PRN
Status: DISCONTINUED | OUTPATIENT
Start: 2025-02-18 | End: 2025-02-19 | Stop reason: HOSPADM

## 2025-02-18 RX ORDER — ONDANSETRON 4 MG/1
4 TABLET, ORALLY DISINTEGRATING ORAL EVERY 6 HOURS PRN
Status: DISCONTINUED | OUTPATIENT
Start: 2025-02-18 | End: 2025-02-19 | Stop reason: HOSPADM

## 2025-02-18 RX ORDER — HYDROMORPHONE HCL IN WATER/PF 6 MG/30 ML
0.2 PATIENT CONTROLLED ANALGESIA SYRINGE INTRAVENOUS
Status: DISCONTINUED | OUTPATIENT
Start: 2025-02-18 | End: 2025-02-19 | Stop reason: HOSPADM

## 2025-02-18 RX ORDER — NALOXONE HYDROCHLORIDE 0.4 MG/ML
0.1 INJECTION, SOLUTION INTRAMUSCULAR; INTRAVENOUS; SUBCUTANEOUS
Status: CANCELLED | OUTPATIENT
Start: 2025-02-18

## 2025-02-18 RX ORDER — HYDROMORPHONE HCL IN WATER/PF 6 MG/30 ML
0.4 PATIENT CONTROLLED ANALGESIA SYRINGE INTRAVENOUS EVERY 5 MIN PRN
Status: DISCONTINUED | OUTPATIENT
Start: 2025-02-18 | End: 2025-02-18 | Stop reason: HOSPADM

## 2025-02-18 RX ORDER — NALOXONE HYDROCHLORIDE 0.4 MG/ML
0.2 INJECTION, SOLUTION INTRAMUSCULAR; INTRAVENOUS; SUBCUTANEOUS
Status: DISCONTINUED | OUTPATIENT
Start: 2025-02-18 | End: 2025-02-19 | Stop reason: HOSPADM

## 2025-02-18 RX ORDER — ONDANSETRON 2 MG/ML
4 INJECTION INTRAMUSCULAR; INTRAVENOUS EVERY 30 MIN PRN
Status: CANCELLED | OUTPATIENT
Start: 2025-02-18

## 2025-02-18 RX ORDER — IBUPROFEN 600 MG/1
600 TABLET, FILM COATED ORAL EVERY 6 HOURS PRN
Qty: 20 TABLET | Refills: 0 | Status: SHIPPED | OUTPATIENT
Start: 2025-02-18

## 2025-02-18 RX ORDER — METRONIDAZOLE 500 MG/100ML
500 INJECTION, SOLUTION INTRAVENOUS
Status: COMPLETED | OUTPATIENT
Start: 2025-02-18 | End: 2025-02-18

## 2025-02-18 RX ORDER — PROCHLORPERAZINE MALEATE 10 MG
10 TABLET ORAL EVERY 6 HOURS PRN
Status: DISCONTINUED | OUTPATIENT
Start: 2025-02-18 | End: 2025-02-19 | Stop reason: HOSPADM

## 2025-02-18 RX ORDER — ACETAMINOPHEN 325 MG/1
975 TABLET ORAL EVERY 6 HOURS PRN
Status: DISCONTINUED | OUTPATIENT
Start: 2025-02-18 | End: 2025-02-19 | Stop reason: HOSPADM

## 2025-02-18 RX ORDER — ONDANSETRON 2 MG/ML
INJECTION INTRAMUSCULAR; INTRAVENOUS PRN
Status: DISCONTINUED | OUTPATIENT
Start: 2025-02-18 | End: 2025-02-18

## 2025-02-18 RX ORDER — LOSARTAN POTASSIUM 50 MG/1
100 TABLET ORAL DAILY
Status: DISCONTINUED | OUTPATIENT
Start: 2025-02-18 | End: 2025-02-19 | Stop reason: HOSPADM

## 2025-02-18 RX ORDER — HYDROXYZINE HYDROCHLORIDE 25 MG/1
25 TABLET, FILM COATED ORAL EVERY 6 HOURS PRN
Status: DISCONTINUED | OUTPATIENT
Start: 2025-02-18 | End: 2025-02-19 | Stop reason: HOSPADM

## 2025-02-18 RX ORDER — AMLODIPINE BESYLATE 5 MG/1
5 TABLET ORAL DAILY
Status: DISCONTINUED | OUTPATIENT
Start: 2025-02-19 | End: 2025-02-19 | Stop reason: HOSPADM

## 2025-02-18 RX ORDER — OXYCODONE HYDROCHLORIDE 5 MG/1
10 TABLET ORAL EVERY 4 HOURS PRN
Status: DISCONTINUED | OUTPATIENT
Start: 2025-02-18 | End: 2025-02-19 | Stop reason: HOSPADM

## 2025-02-18 RX ORDER — FENTANYL CITRATE 50 UG/ML
25 INJECTION, SOLUTION INTRAMUSCULAR; INTRAVENOUS EVERY 5 MIN PRN
Status: DISCONTINUED | OUTPATIENT
Start: 2025-02-18 | End: 2025-02-18 | Stop reason: HOSPADM

## 2025-02-18 RX ORDER — HYDROMORPHONE HCL IN WATER/PF 6 MG/30 ML
0.2 PATIENT CONTROLLED ANALGESIA SYRINGE INTRAVENOUS EVERY 5 MIN PRN
Status: DISCONTINUED | OUTPATIENT
Start: 2025-02-18 | End: 2025-02-18 | Stop reason: HOSPADM

## 2025-02-18 RX ORDER — METFORMIN HYDROCHLORIDE 500 MG/1
500 TABLET, EXTENDED RELEASE ORAL
Status: DISCONTINUED | OUTPATIENT
Start: 2025-02-19 | End: 2025-02-19 | Stop reason: HOSPADM

## 2025-02-18 RX ORDER — ONDANSETRON 4 MG/1
4 TABLET, ORALLY DISINTEGRATING ORAL EVERY 30 MIN PRN
Status: CANCELLED | OUTPATIENT
Start: 2025-02-18

## 2025-02-18 RX ORDER — METFORMIN HYDROCHLORIDE 500 MG/1
500 TABLET, EXTENDED RELEASE ORAL 2 TIMES DAILY WITH MEALS
Status: DISCONTINUED | OUTPATIENT
Start: 2025-02-19 | End: 2025-02-18

## 2025-02-18 RX ORDER — GLYCOPYRROLATE 0.2 MG/ML
INJECTION, SOLUTION INTRAMUSCULAR; INTRAVENOUS PRN
Status: DISCONTINUED | OUTPATIENT
Start: 2025-02-18 | End: 2025-02-18

## 2025-02-18 RX ORDER — OXYCODONE HYDROCHLORIDE 5 MG/1
5 TABLET ORAL
Status: CANCELLED | OUTPATIENT
Start: 2025-02-18

## 2025-02-18 RX ORDER — FENTANYL CITRATE 50 UG/ML
INJECTION, SOLUTION INTRAMUSCULAR; INTRAVENOUS PRN
Status: DISCONTINUED | OUTPATIENT
Start: 2025-02-18 | End: 2025-02-18

## 2025-02-18 RX ORDER — SODIUM CHLORIDE, SODIUM LACTATE, POTASSIUM CHLORIDE, CALCIUM CHLORIDE 600; 310; 30; 20 MG/100ML; MG/100ML; MG/100ML; MG/100ML
INJECTION, SOLUTION INTRAVENOUS CONTINUOUS
Status: DISCONTINUED | OUTPATIENT
Start: 2025-02-18 | End: 2025-02-18 | Stop reason: HOSPADM

## 2025-02-18 RX ORDER — NALOXONE HYDROCHLORIDE 0.4 MG/ML
0.1 INJECTION, SOLUTION INTRAMUSCULAR; INTRAVENOUS; SUBCUTANEOUS
Status: DISCONTINUED | OUTPATIENT
Start: 2025-02-18 | End: 2025-02-18 | Stop reason: HOSPADM

## 2025-02-18 RX ORDER — DEXAMETHASONE SODIUM PHOSPHATE 4 MG/ML
4 INJECTION, SOLUTION INTRA-ARTICULAR; INTRALESIONAL; INTRAMUSCULAR; INTRAVENOUS; SOFT TISSUE
Status: DISCONTINUED | OUTPATIENT
Start: 2025-02-18 | End: 2025-02-18 | Stop reason: HOSPADM

## 2025-02-18 RX ORDER — LIDOCAINE 40 MG/G
CREAM TOPICAL
Status: DISCONTINUED | OUTPATIENT
Start: 2025-02-18 | End: 2025-02-19 | Stop reason: HOSPADM

## 2025-02-18 RX ORDER — FENTANYL CITRATE 50 UG/ML
50 INJECTION, SOLUTION INTRAMUSCULAR; INTRAVENOUS EVERY 5 MIN PRN
Status: DISCONTINUED | OUTPATIENT
Start: 2025-02-18 | End: 2025-02-18 | Stop reason: HOSPADM

## 2025-02-18 RX ORDER — ONDANSETRON 4 MG/1
4 TABLET, ORALLY DISINTEGRATING ORAL EVERY 30 MIN PRN
Status: DISCONTINUED | OUTPATIENT
Start: 2025-02-18 | End: 2025-02-18 | Stop reason: HOSPADM

## 2025-02-18 RX ORDER — CALCIUM CARBONATE 500 MG/1
500 TABLET, CHEWABLE ORAL 4 TIMES DAILY PRN
Status: DISCONTINUED | OUTPATIENT
Start: 2025-02-18 | End: 2025-02-19 | Stop reason: HOSPADM

## 2025-02-18 RX ORDER — IBUPROFEN 600 MG/1
600 TABLET, FILM COATED ORAL EVERY 6 HOURS PRN
Status: DISCONTINUED | OUTPATIENT
Start: 2025-02-18 | End: 2025-02-19 | Stop reason: HOSPADM

## 2025-02-18 RX ORDER — ACETAMINOPHEN 500 MG
500-1000 TABLET ORAL EVERY 6 HOURS PRN
COMMUNITY
Start: 2025-02-18

## 2025-02-18 RX ORDER — LIDOCAINE 40 MG/G
CREAM TOPICAL
Status: DISCONTINUED | OUTPATIENT
Start: 2025-02-18 | End: 2025-02-18 | Stop reason: HOSPADM

## 2025-02-18 RX ORDER — AMOXICILLIN 250 MG
1 CAPSULE ORAL 2 TIMES DAILY PRN
COMMUNITY
Start: 2025-02-18

## 2025-02-18 RX ORDER — PROPOFOL 10 MG/ML
INJECTION, EMULSION INTRAVENOUS PRN
Status: DISCONTINUED | OUTPATIENT
Start: 2025-02-18 | End: 2025-02-18

## 2025-02-18 RX ADMIN — LOSARTAN POTASSIUM 100 MG: 50 TABLET, FILM COATED ORAL at 13:18

## 2025-02-18 RX ADMIN — DEXAMETHASONE SODIUM PHOSPHATE 10 MG: 10 INJECTION, SOLUTION INTRAMUSCULAR; INTRAVENOUS at 08:03

## 2025-02-18 RX ADMIN — ROCURONIUM 50 MG: 50 INJECTION, SOLUTION INTRAVENOUS at 09:08

## 2025-02-18 RX ADMIN — FENTANYL CITRATE 100 MCG: 50 INJECTION, SOLUTION INTRAMUSCULAR; INTRAVENOUS at 07:36

## 2025-02-18 RX ADMIN — SODIUM CHLORIDE, SODIUM LACTATE, POTASSIUM CHLORIDE, AND CALCIUM CHLORIDE: .6; .31; .03; .02 INJECTION, SOLUTION INTRAVENOUS at 06:39

## 2025-02-18 RX ADMIN — FENTANYL CITRATE 25 MCG: 50 INJECTION, SOLUTION INTRAMUSCULAR; INTRAVENOUS at 11:08

## 2025-02-18 RX ADMIN — KETOROLAC TROMETHAMINE 30 MG: 30 INJECTION, SOLUTION INTRAMUSCULAR at 10:19

## 2025-02-18 RX ADMIN — ALBUTEROL SULFATE 4 PUFF: 90 AEROSOL, METERED RESPIRATORY (INHALATION) at 08:36

## 2025-02-18 RX ADMIN — GLYCOPYRROLATE 0.2 MG: 0.2 INJECTION INTRAMUSCULAR; INTRAVENOUS at 08:35

## 2025-02-18 RX ADMIN — FENTANYL CITRATE 100 MCG: 50 INJECTION, SOLUTION INTRAMUSCULAR; INTRAVENOUS at 07:58

## 2025-02-18 RX ADMIN — METRONIDAZOLE 500 MG: 500 INJECTION, SOLUTION INTRAVENOUS at 06:41

## 2025-02-18 RX ADMIN — HYDROMORPHONE HYDROCHLORIDE 0.5 MG: 1 INJECTION, SOLUTION INTRAMUSCULAR; INTRAVENOUS; SUBCUTANEOUS at 10:11

## 2025-02-18 RX ADMIN — LIDOCAINE HYDROCHLORIDE 50 MG: 10 INJECTION, SOLUTION INFILTRATION; PERINEURAL at 07:36

## 2025-02-18 RX ADMIN — DEXMEDETOMIDINE HYDROCHLORIDE 4 MCG: 100 INJECTION, SOLUTION INTRAVENOUS at 10:28

## 2025-02-18 RX ADMIN — PHENYLEPHRINE HYDROCHLORIDE 100 MCG: 10 INJECTION INTRAVENOUS at 08:37

## 2025-02-18 RX ADMIN — LIDOCAINE HYDROCHLORIDE 50 MG: 10 INJECTION, SOLUTION INFILTRATION; PERINEURAL at 07:58

## 2025-02-18 RX ADMIN — SODIUM CHLORIDE, POTASSIUM CHLORIDE, SODIUM LACTATE AND CALCIUM CHLORIDE: 600; 310; 30; 20 INJECTION, SOLUTION INTRAVENOUS at 07:29

## 2025-02-18 RX ADMIN — PHENYLEPHRINE HYDROCHLORIDE 200 MCG: 10 INJECTION INTRAVENOUS at 10:21

## 2025-02-18 RX ADMIN — PHENYLEPHRINE HYDROCHLORIDE 100 MCG: 10 INJECTION INTRAVENOUS at 08:45

## 2025-02-18 RX ADMIN — PHENYLEPHRINE HYDROCHLORIDE 100 MCG: 10 INJECTION INTRAVENOUS at 09:01

## 2025-02-18 RX ADMIN — PHENYLEPHRINE HYDROCHLORIDE 100 MCG: 10 INJECTION INTRAVENOUS at 08:42

## 2025-02-18 RX ADMIN — ONDANSETRON 4 MG: 2 INJECTION INTRAMUSCULAR; INTRAVENOUS at 10:19

## 2025-02-18 RX ADMIN — Medication 2 G: at 07:58

## 2025-02-18 RX ADMIN — HYDROMORPHONE HYDROCHLORIDE 0.5 MG: 1 INJECTION, SOLUTION INTRAMUSCULAR; INTRAVENOUS; SUBCUTANEOUS at 08:22

## 2025-02-18 RX ADMIN — PROPOFOL 200 MG: 10 INJECTION, EMULSION INTRAVENOUS at 07:36

## 2025-02-18 RX ADMIN — ACETAMINOPHEN 975 MG: 325 TABLET ORAL at 22:52

## 2025-02-18 RX ADMIN — SUGAMMADEX 400 MG: 100 INJECTION, SOLUTION INTRAVENOUS at 10:29

## 2025-02-18 RX ADMIN — BUPROPION HYDROCHLORIDE 150 MG: 150 TABLET, FILM COATED, EXTENDED RELEASE ORAL at 13:18

## 2025-02-18 RX ADMIN — SODIUM CHLORIDE, POTASSIUM CHLORIDE, SODIUM LACTATE AND CALCIUM CHLORIDE: 600; 310; 30; 20 INJECTION, SOLUTION INTRAVENOUS at 10:23

## 2025-02-18 RX ADMIN — ACETAMINOPHEN 975 MG: 325 TABLET ORAL at 15:48

## 2025-02-18 RX ADMIN — PROPOFOL 200 MG: 10 INJECTION, EMULSION INTRAVENOUS at 07:58

## 2025-02-18 RX ADMIN — PHENYLEPHRINE HYDROCHLORIDE 100 MCG: 10 INJECTION INTRAVENOUS at 10:18

## 2025-02-18 RX ADMIN — HYDROXYZINE HYDROCHLORIDE 25 MG: 25 TABLET, FILM COATED ORAL at 21:14

## 2025-02-18 RX ADMIN — ROCURONIUM 50 MG: 50 INJECTION, SOLUTION INTRAVENOUS at 07:58

## 2025-02-18 RX ADMIN — ROCURONIUM 50 MG: 50 INJECTION, SOLUTION INTRAVENOUS at 07:36

## 2025-02-18 ASSESSMENT — ACTIVITIES OF DAILY LIVING (ADL)
ADLS_ACUITY_SCORE: 35
ADLS_ACUITY_SCORE: 35
ADLS_ACUITY_SCORE: 33
ADLS_ACUITY_SCORE: 35
ADLS_ACUITY_SCORE: 18
ADLS_ACUITY_SCORE: 35
ADLS_ACUITY_SCORE: 18
ADLS_ACUITY_SCORE: 18
ADLS_ACUITY_SCORE: 35
ADLS_ACUITY_SCORE: 35
ADLS_ACUITY_SCORE: 18
ADLS_ACUITY_SCORE: 35

## 2025-02-18 NOTE — PROCEDURES
DATE OF SERVICE:    2/18/2025      SURGEON:    Delphine Humphreys MD    ASSISTANT:   Yvonne Hernandez PA-C     PREOPERATIVE DIAGNOSIS:   Pelvic Mass  Mildly elevated Ca125 tumor marker  History of endometriosis    POSTOPERATIVE DIAGNOSIS:   Same  Dense pelvic adhesions    PROCEDURE:  Pelvic washings, robotic assisted total laparoscopic hysterectomy, bilateral salpingo-oophorectomy, extensive lysis of adhesions, omental biopsy     ANESTHESIA:    General endotracheal      COMPLICATIONS:  None.     ESTIMATED BLOOD LOSS :   30 mL    IV FLUIDS:    1000 mL LR    URINE OUTPUT:   Not recorded    FINDINGS:  Smooth peritoneal cavity.  Normal-appearing liver capsule.  Normal stomach, omentum, small and large intestine.  The appendix was not visualized.  The bilateral ovaries were significantly enlarged.  The rectosigmoid colon was densely adherent to the posterior aspect of the uterus in addition to the bilateral ovarian masses.  The ureters were tethered medially bilaterally to the bilateral ovarian masses.  There was retroperitoneal fibrosis along the left pelvic sidewall.  There was evidence of peritoneal scarring secondary to old endometriosis likely involving the bowel epiploica.  There were superficial cystic-like changes of the uterine serosa of uncertain etiology.  Frozen section analysis was consistent with a benign appearing left ovarian mass and an overall benign appearing right ovarian mass with abnormal nuclei.  Secondary to this, a sampling of the omentum was taken at the time of surgery.  Of note, the patient experienced desaturation during the insufflation portion of the procedure.  This was resolved with moving the endotracheal tube, providing epinephrine and with an albuterol inhaler.  Of note, the patient has a history of asthma.  The desaturation was quickly recovered.     PROCEDURE IN DETAIL:  Cristina Eaton is a very pleasant 50 year old-year-old female with a history of asthma, morbid obesity and  endometriosis who was identified on a lumbar MRI to have bilateral pelvic masses of uncertain etiology.  She was seen in consult and underwent a  tumor marker draw which was mildly elevated at 112 units/mL.  Imaging was concerning for tethering within the posterior cul-de-sac.  The patient was seen by myself in consult.  We reviewed the above-stated procedure in great detail.  The surgical staging was reviewed.  The risks, benefits and alternatives were discussed in detail.  The consent was signed in the preoperative area. The patient was subsequently brought to the the operating room for a robotic assisted total laparoscopic hysterectomy with bilateral salpingo-oophorectomy and possible staging where general anesthesia was found to be adequate. She was prepared and draped in the normal sterile fashion in lithotomy positioning. Her arms were padded and tucked at her sides.  A briefing and timeout were performed and the staff in the room were educated as to the planned procedure.      At the start of the case, an open sided speculum was placed within the vagina, and the anterior lip of the cervix grasped with the single-toothed tenaculum. The uterine sound was used to cannulate the cervix. A stitch was placed across the anterior lip of the cervix, and a medium V-care was advanced into the endocervical canal. The V-care was seated, and attention was turned to the abdomen.     A supraumbilical incision was made roughly 27 cm above the pubic symphysis. The 5 mm laparoscopic camera was introduced into the abdomen with an 8 mm optical viewing trocar. Following confirmation of entrance into the abdomen by visual inspection of the intraperitoneal space, the abdomen was insufflated to 15 mm Hg. Additional port sites were mapped out 11 cm lateral with a 15 degree drop to each side of the camera port site. The right lower quadrant port site for assistant manipulation was placed 2 cm cephalad and medial to the ASIS. All  incisions were 8 mm. The left lower quadrant port site was mapped out 11 mm lateral with a 30 degree drop to the left leg and was placed under direct visualization without any issue.  At this time, the patient had gradually desaturated to the mid 80 percentile on 100% FiO2.  The CO2 gas was turned off allowing the pressures within the abdominal cavity to lower.  The anesthesia team pulled back on the endotracheal tube to ensure it was within the main stem, they administered ephedrine and in addition provided an albuterol inhaler.  Subsequently, the patient gradually improved.  The CO2 gas was allowed to reinsufflate the abdomen without issue.  The patient tolerated these changes and was placed in steep trendelenberg. The bowel fell freely into the upper abdomen. The robot was then docked.     The instruments were all cannulated.  Pelvic washings were obtained.  The hysterectomy was initiated. The right round ligament was held on tension and transected with monopolar energy. The peritoneum along the right pelvic sidewall was developed by opening the peritoneum just lateral and parallel to the gonadal vessels. The para-rectal space was developed between the ureter and the internal iliac artery. The medial leaf of the broad ligament was then developed, and the ureter was visualized along the medial posterior aspect. The gray space bound by the IP ligament, the utero-ovarian ligament and the ureter was opened sharply. The gonadal vessels were then held directly anterior, and following were skeletonized, coagulated, sealed and transected with the bipolar forceps. The posterior leaf was taken down to the uterosacral ligament on the right.  This required dissection of the ureter in order to mobilize this more laterally.  The posterior leaf of the broad ligament was subsequently dissected away from the right mesentery of the sigmoid colon.  Attention was turned to the posterior aspect of the uterus and cervix where the  rectosigmoid colon was dissected with the cold cut juan manuel away from the posterior aspect of the uterine body.  This did unfortunately result in the right ovarian mass rupturing.  Additional dissection occurred developing the rectovaginal septum.  Once this was completely freed, attention was turned to the left pelvic sidewall. In a similar fashion, the round ligament and left pelvic peritoneum were similarly opened and developed. The round ligament was held on tension and transected with monopolar energy. The para-rectal space was opened and developed with the same boundaries. The gray space was similarly developed by holding the gonadal vessels on stretch anteriorly and opening the gray space with the boundaries described above.  There is a mild degree of retroperitoneal fibrosis associated with the region of tethering of the left sided pelvic mass to the posterior pelvic peritoneum.  The gonadal vessels were similarly skeletonized, coagulated, sealed and transected. The posterior leaf was similarly dissected to the left uterosacral ligament.  Again, along the left posterior pelvic peritoneum, the ureter was dissected laterally in order to develop the peritoneal plane.  The left ovarian mass was dissected away from the left uterosacral ligament.  The left uterosacral ligament was quite thickened and scarred.  The rectosigmoid colon was again dissected away from the left uterosacral ligament and the rectovaginal septum opened along the left lateral aspect.  Attention was turned anterior.  The anterior leaf was dissected to initiate the bladder flap by transecting the vesicouterine peritoneum. The pubocervical fascia was developed over the anterior cervix to fully expose the anterior colpotomy ring. Attention was turned to the uterine vessels bilaterally. These were skeletonized, coagulated with the bipolar forceps and transected with the monopolar juan manuel.  The ureter on the left was rolled laterally during the  dissection of the uterine vessels on the left.  The cardinal ligament was then coagulated by holding the bipolar forceps parallel to the uterine cervix, and secondarily dissected away from the lateral cervix with the monopolar juan manuel. Attention was turned posterior, and the colopotomy inititiated. The colpotomy was then circumferentially completed.  The uterus, cervix, both fallopian tubes and ovaries were delivered transvaginally.  The specimen was sent for frozen section analysis.  The vaginal cuff was made hemostatic.  Frozen section analysis returned with a benign appearing left ovarian mass, in addition to an overall benign appearing right ovarian mass with abnormal nuclei.  Secondary to this, an omental biopsy was taken.    The omental biopsy was undertaken by grasping the omentum and pulling it inferior to the pelvis. Intervening windows between the omental vessels were developed with the robotic vessel sealing device and the omental vessels at the mid-right omentum coagulated, sealed and transected. Following, the gastroepiploic vessels on the hepatic aspect of the omentum were coagulated, sealed and transected. The loose, areolar adhesions to the transverse colon were dissected and transected with the robotic vessel sealing device.  While remaining below the transverse colon, the intervening omental vessels were sealed and transected with the robotic vessel sealing device in the direction of the splenic flexure. The gastro-epiploic vessels on the splenic aspect were sealed and transected. The omental biopsy was then delivered transvaginally and sent for routine processing.    The vagina was then closed with a Stratafix 0 PDS barbed unidirectional suture with a second overlay.  The pelvis was irrigated and all surgical sites were found to be hemostatic.  The hemostatic agent, Surgicel snow was placed within the posterior cul-de-sac and along the bilateral pelvic sidewalls.  At the completion, all robotic  instruments were removed from the patient's abdomen, and the robot was un-docked. The skin incisions were re-approximated with a 3-0 subcuticular stitch followed by an overlying layer of dermabond.  All skin incisions were injected with half percent Marcaine.     The vagina was inspected and found to be hemostatic. All sponges, needles and instrument counts were correct x2. She was taken to the recovery room in a stable condition.     Yvonne Hernandez PA-C assisted me throughout the case and was paramount in the completion of all vital portions.  She assisted with placement of the uterine manipulator, placement of the robotic trocars, retraction, suctioning, adequate visualization, delivery of all specimens and closure.    Delphine Humphreys MD  [facility]  Gynecologic Oncologist  [facility]  [phone]

## 2025-02-18 NOTE — ANESTHESIA PROCEDURE NOTES
Airway         Procedure Start/Stop Times: 2/18/2025 7:59 AM  Staff -        CRNA: Gallo Washington APRN CRNA       Performed By: CRNA  Consent for Airway        Urgency: elective  Indications and Patient Condition       Indications for airway management: marlee-procedural       Induction type:intravenous       Mask difficulty assessment: 1 - vent by mask    Final Airway Details       Final airway type: endotracheal airway       Successful airway: ETT - single  Endotracheal Airway Details        ETT size (mm): 7.0       Cuffed: yes       Successful intubation technique: direct laryngoscopy       DL Blade Type: MAC 3       Grade View of Cords: 1       Adjucts: stylet       Position: Right       Measured from: gums/teeth       Secured at (cm): 21       Bite block used: None    Post intubation assessment        Placement verified by: capnometry, equal breath sounds and chest rise        Number of attempts at approach: 1       Number of other approaches attempted: 0       Secured with: tape       Ease of procedure: easy       Dentition: Intact    Medication(s) Administered   Medication Administration Time: 2/18/2025 7:59 AM

## 2025-02-18 NOTE — ANESTHESIA PROCEDURE NOTES
Arterial Line Procedure Note    Pre-Procedure   Staff -        Anesthesiologist:  Toni Foote MD       Performed By: anesthesiologist       Location: OR       Pre-Anesthestic Checklist: patient identified, IV checked, risks and benefits discussed, informed consent, monitors and equipment checked, pre-op evaluation and at physician/surgeon's request  Timeout:       Correct Patient: Yes        Correct Procedure: Yes        Correct Site: Yes        Correct Position: Yes   Line Placement:   This line was placed Pre Induction starting at 2/18/2025 8:50 AM and ending at 2/18/2025 8:55 AM  Procedure   Procedure: arterial line       Laterality: left       Insertion Site: radial.  Sterile Prep        Standard elements of sterile barrier followed       Skin prep: Chloraprep  Insertion/Injection        1. Ultrasound was used to evaluate the access site.       2. Artery evaluated via ultrasound for patency/adequacy.       3. Using real-time ultrasound the needle/catheter was observed entering the artery/vein.       4. Permanent image was captured and entered into the patient's record.       5. The visualized structures were anatomically normal.       6. There were no apparent abnormal pathologic findings.       Catheter Type/Size: 20 G, 1.75 in/4.5 cm quick cath (integral wire)  Narrative         Secured by: anchor securement device       Tegaderm dressing used.       Complications: None apparent,        Arterial waveform: Yes

## 2025-02-18 NOTE — ANESTHESIA CARE TRANSFER NOTE
Patient: Cristina Eaton    Procedure: Procedure(s):  ROBOTIC ASSISTED TOTAL LAPAROSCOPIC HYSTERECTOMY BILATERAL SALPINGO OOPHORECTOMY PELVIC WASHINGS, OMENTECTOMY, LYSIS OF ADHESIONS       Diagnosis: Adnexal mass [N94.89]  Diagnosis Additional Information: No value filed.    Anesthesia Type:   General     Note:    Oropharynx: oropharynx clear of all foreign objects and spontaneously breathing  Level of Consciousness: awake  Oxygen Supplementation: face mask  Level of Supplemental Oxygen (L/min / FiO2): 8  Independent Airway: airway patency satisfactory and stable  Dentition: dentition unchanged  Vital Signs Stable: post-procedure vital signs reviewed and stable  Report to RN Given: handoff report given  Patient transferred to: PACU    Handoff Report: Identifed the Patient, Identified the Reponsible Provider, Reviewed the pertinent medical history, Discussed the surgical course, Reviewed Intra-OP anesthesia mangement and issues during anesthesia, Set expectations for post-procedure period and Allowed opportunity for questions and acknowledgement of understanding    Vitals:  Vitals Value Taken Time   /88 02/18/25 1046      Temp 36.5 02/18/25 1046      Pulse 96    Resp 48 02/18/25 1041   SpO2 100 % 02/18/25 1046   Vitals shown include unfiled device data.    Electronically Signed By: STEPHEN Barnes CRNA  February 18, 2025  10:47 AM

## 2025-02-18 NOTE — ANESTHESIA POSTPROCEDURE EVALUATION
Patient: Cristina Eaton    Procedure: Procedure(s):  ROBOTIC ASSISTED TOTAL LAPAROSCOPIC HYSTERECTOMY BILATERAL SALPINGO OOPHORECTOMY PELVIC WASHINGS, OMENTECTOMY, LYSIS OF ADHESIONS       Anesthesia Type:  General    Note:  Disposition: Outpatient   Postop Pain Control: Uneventful            Sign Out: Well controlled pain   PONV: No   Neuro/Psych: Uneventful            Sign Out: Acceptable/Baseline neuro status   Airway/Respiratory: Uneventful            Sign Out: Acceptable/Baseline resp. status   CV/Hemodynamics: Uneventful            Sign Out: Acceptable CV status; No obvious hypovolemia; No obvious fluid overload   Other NRE: NONE   DID A NON-ROUTINE EVENT OCCUR? No    Event details/Postop Comments:  Site of infiltrated IV soft without edema or erythema. It is likely that infiltrated meds have been resorbed. Will watch closely in the PACU           Last vitals:  Vitals Value Taken Time   /64 02/18/25 1053   Temp 37.1  C (98.8  F) 02/18/25 1040   Pulse 83 02/18/25 1056   Resp     SpO2 99 % 02/18/25 1056   Vitals shown include unfiled device data.    Electronically Signed By: Toni Foote MD  February 18, 2025  10:57 AM

## 2025-02-18 NOTE — PHARMACY-ADMISSION MEDICATION HISTORY
Pharmacist Admission Medication History    Admission medication history is complete. The information provided in this note is only as accurate as the sources available at the time of the update.    Information Source(s): Patient and CareEverywhere/SureScripts via in-person    Pertinent Information: None     Changes made to PTA medication list:  Added: None  Deleted: None  Changed: metformin to once daily     Allergies reviewed with patient and updates made in EHR: yes    Medication History Completed By: ERICA KAPOOR RPH 2/18/2025 3:53 PM    PTA Med List   Medication Sig Last Dose/Taking    albuterol (PROAIR HFA/PROVENTIL HFA/VENTOLIN HFA) 108 (90 Base) MCG/ACT inhaler Inhale 2 puffs into the lungs every 6 hours as needed for shortness of breath or wheezing Taking As Needed    amLODIPine (NORVASC) 5 MG tablet Take 1 tablet (5 mg) by mouth daily. 2/18/2025 at  4:30 AM    buPROPion (WELLBUTRIN XL) 150 MG 24 hr tablet Take 1 tablet (150 mg) by mouth every morning. 2/18/2025 Morning    losartan (COZAAR) 100 MG tablet Take 1 tablet (100 mg) by mouth daily. 2/18/2025 Morning    metFORMIN (GLUCOPHAGE XR) 500 MG 24 hr tablet Take 1 tablet (500 mg) by mouth 2 times daily (with meals). (Patient taking differently: Take 500 mg by mouth daily (with breakfast).) 2/18/2025 Morning    Semaglutide-Weight Management (WEGOVY) 2.4 MG/0.75ML pen Inject 2.4 mg subcutaneously once a week. Do not start before September 23, 2024. More than a month

## 2025-02-18 NOTE — DISCHARGE SUMMARY
HOSPITAL DISCHARGE SUMMARY    Patient Name: Cristina Eaton  YOB: 1975 Age: 50 year old  Medical Record Number: [id number]  Primary Physician: Milligan, Deirdre E  Phone: [phone]  Admission Date: 2/18/2025  Discharge Date: 2/19/2025    Cristina Eaton  will be discharged from [facility] [place] [facility] to Home.    PRINCIPAL DISCHARGE DIAGNOSIS:   Pelvic Mass  Mildly elevated Ca125 tumor marker  History of endometriosis    BRIEF HOSPITAL COURSE: This 50 year old female admitted following the procedure stated below. She tolerated the procedure well. Uneventful post operative course and discharge to home on POD #1 with adequate pain control, tolerating orals, voiding and ambulating.    PROCEDURES PERFORMED DURING HOSPITALIZATION:   Pelvic washings, robotic assisted total laparoscopic hysterectomy, bilateral salpingo-oophorectomy, extensive lysis of adhesions, omental biopsy     COMPLICATIONS IN HOSPITAL: None    CONSULTATIONS:  none    PERTINENT FINDINGS/RESULTS AT DISCHARGE:   BP (P) 113/63 (BP Location: Right arm)   Pulse (P) 84   Temp (P) 98.2  F (36.8  C) (Oral)   Resp (P) 18   Wt 98.3 kg (216 lb 11.4 oz)   SpO2 (P) 95%   BMI 32.47 kg/m      Latest Laboratory Results:  Chem:  CBC RESULTS:   Recent Labs   Lab Test 02/12/25  1224   WBC 6.8   RBC 4.65   HGB 13.9   HCT 41.5   MCV 89   MCH 29.9   MCHC 33.5   RDW 13.2        Last Basic Metabolic Panel:  Lab Results   Component Value Date     02/12/2025      Lab Results   Component Value Date    POTASSIUM 4.2 02/12/2025    POTASSIUM 4.0 01/02/2019     Lab Results   Component Value Date    CHLORIDE 105 02/12/2025    CHLORIDE 106 01/02/2019     Lab Results   Component Value Date    OLI 9.2 02/12/2025     Lab Results   Component Value Date    CO2 22 02/12/2025    CO2 23 01/02/2019     Lab Results   Component Value Date    BUN 10.4 02/12/2025    BUN 13 01/02/2019     Lab Results   Component Value Date    CR 0.82 02/12/2025     Lab Results    Component Value Date    GLC 90 02/12/2025    GLC 86 07/08/2021         IMPORTANT PENDING TEST RESULTS:  Pathology    CONDITION AT DISCHARGE:    Stabilized    DISCHARGE ORDERS  Current Discharge Medication List        START taking these medications    Details   acetaminophen (TYLENOL) 500 MG tablet Take 1-2 tablets (500-1,000 mg) by mouth every 6 hours as needed for mild pain.    Associated Diagnoses: S/P hysterectomy with oophorectomy      ibuprofen (ADVIL/MOTRIN) 600 MG tablet Take 1 tablet (600 mg) by mouth every 6 hours as needed.  Qty: 20 tablet, Refills: 0    Associated Diagnoses: S/P hysterectomy with oophorectomy      oxyCODONE (ROXICODONE) 5 MG tablet Take 1 tablet (5 mg) by mouth every 4 hours as needed for moderate to severe pain.  Qty: 8 tablet, Refills: 0    Associated Diagnoses: S/P hysterectomy with oophorectomy      senna-docusate (SENOKOT-S/PERICOLACE) 8.6-50 MG tablet Take 1 tablet by mouth 2 times daily as needed for constipation.    Associated Diagnoses: S/P hysterectomy with oophorectomy           CONTINUE these medications which have NOT CHANGED    Details   amLODIPine (NORVASC) 5 MG tablet Take 1 tablet (5 mg) by mouth daily.  Qty: 90 tablet, Refills: 4    Associated Diagnoses: Benign essential hypertension      Semaglutide-Weight Management (WEGOVY) 2.4 MG/0.75ML pen Inject 2.4 mg subcutaneously once a week. Do not start before September 23, 2024.  Qty: 9 mL, Refills: 3    Associated Diagnoses: Obesity (BMI 30-39.9)      albuterol (PROAIR HFA/PROVENTIL HFA/VENTOLIN HFA) 108 (90 Base) MCG/ACT inhaler Inhale 2 puffs into the lungs every 6 hours as needed for shortness of breath or wheezing  Qty: 18 g, Refills: 3    Comments: Pharmacy may dispense brand covered by insurance (Proair, or proventil or ventolin or generic albuterol inhaler)  Associated Diagnoses: Cough, unspecified type      buPROPion (WELLBUTRIN XL) 150 MG 24 hr tablet Take 1 tablet (150 mg) by mouth every morning.  Qty: 90  tablet, Refills: 4    Associated Diagnoses: Anxiety state; Anxiety and depression      levonorgestrel (MIRENA) 20 MCG/24HR IUD 1 each (20 mcg) by Intrauterine route once    Associated Diagnoses: Menorrhagia with regular cycle; Encounter for insertion of intrauterine contraceptive device      losartan (COZAAR) 100 MG tablet Take 1 tablet (100 mg) by mouth daily.  Qty: 90 tablet, Refills: 4    Associated Diagnoses: Benign essential hypertension      metFORMIN (GLUCOPHAGE XR) 500 MG 24 hr tablet Take 1 tablet (500 mg) by mouth 2 times daily (with meals).  Qty: 180 tablet, Refills: 3    Associated Diagnoses: Insulin resistance; Class 1 obesity with body mass index (BMI) of 33.0 to 33.9 in adult, unspecified obesity type, unspecified whether serious comorbidity present; Metabolic syndrome             FOLLOW-UP: Cristina Eaton should see Milligan, Deirdre E PRN.    Specialty follow-up: with Dr. Sweta Humphreys's PA in 3-4 weeks.     AFTER HOSPITAL RECOMMENDATIONS  As above.      Physician(s) in addition to primary physician who should receive a copy:  Milligan, Deirdre E

## 2025-02-18 NOTE — CONSULTS
I have seen and examined the patient. There are no updates or changes to the preoperative history and physical.    Delphine Humphreys MD  Gynecologic Oncology  [facility] [facility]  [facility]: [phone]

## 2025-02-18 NOTE — ANESTHESIA PREPROCEDURE EVALUATION
Anesthesia Pre-Procedure Evaluation    Patient: Cristina Eaton   MRN: [id number] : 1975        Procedure : Procedure(s):  ROBOTIC ASSISTED TOTAL LAPAROSCOPIC HYSTERECTOMY BILATERAL SALPINGO OOPHORECTOMY POSIBLE STAGING          Past Medical History:   Diagnosis Date    Anxiety     Asthma     Benign essential hypertension     Bilateral tubo-ovarian mass     Gastroesophageal reflux disease without esophagitis     Insulin resistance     Irritable bowel syndrome     Knee pain     Menorrhagia with regular cycle 2021    Metabolic syndrome X     Obesity       Past Surgical History:   Procedure Laterality Date    LAPAROSCOPIC APPENDECTOMY      Ruptured, did require inpatient IV Abx for several days    TONSILLECTOMY      WISDOM TOOTH EXTRACTION        No Known Allergies   Social History     Tobacco Use    Smoking status: Never     Passive exposure: Never    Smokeless tobacco: Never   Substance Use Topics    Alcohol use: Not Currently     Alcohol/week: 0.0 - 21.0 standard drinks of alcohol     Comment: social      Wt Readings from Last 1 Encounters:   25 95.6 kg (210 lb 12.8 oz)        Anesthesia Evaluation            ROS/MED HX  ENT/Pulmonary:     (+)                      asthma                  Neurologic:  - neg neurologic ROS     Cardiovascular:     (+)  hypertension- -   -  - -                                      METS/Exercise Tolerance:     Hematologic:  - neg hematologic  ROS     Musculoskeletal:  - neg musculoskeletal ROS     GI/Hepatic:     (+) GERD,                   Renal/Genitourinary: Comment: Ovarian mass - neg Renal ROS     Endo:  - neg endo ROS   (+)               Obesity,       Psychiatric/Substance Use:  - neg psychiatric ROS     Infectious Disease:       Malignancy:       Other:            Physical Exam    Airway  airway exam normal      Mallampati: II       Respiratory Devices and Support         Dental       (+) Completely normal teeth      Cardiovascular   cardiovascular exam  normal          Pulmonary   pulmonary exam normal                OUTSIDE LABS:  CBC:   Lab Results   Component Value Date    WBC 6.8 02/12/2025    WBC 6.3 03/17/2024    HGB 13.9 02/12/2025    HGB 14.5 03/17/2024    HCT 41.5 02/12/2025    HCT 41.3 03/17/2024     02/12/2025     03/17/2024     BMP:   Lab Results   Component Value Date     02/12/2025     03/26/2024    POTASSIUM 4.2 02/12/2025    POTASSIUM 3.7 03/26/2024    CHLORIDE 105 02/12/2025    CHLORIDE 105 03/26/2024    CO2 22 02/12/2025    CO2 25 03/26/2024    BUN 10.4 02/12/2025    BUN 14.0 03/26/2024    CR 0.82 02/12/2025    CR 0.80 03/26/2024    GLC 90 02/12/2025    GLC 89 03/26/2024     COAGS:   Lab Results   Component Value Date    PTT 29 03/17/2024    INR 0.93 03/17/2024     POC:   Lab Results   Component Value Date    HCG Negative 02/18/2025     HEPATIC:   Lab Results   Component Value Date    ALBUMIN 4.3 09/21/2023    PROTTOTAL 7.0 09/21/2023    ALT 19 09/21/2023    AST 20 09/21/2023    ALKPHOS 81 09/21/2023    BILITOTAL 0.7 09/21/2023     OTHER:   Lab Results   Component Value Date    A1C 5.2 09/21/2023    OLI 9.2 02/12/2025    TSH 0.60 03/17/2024       Anesthesia Plan    ASA Status:  3    NPO Status:  NPO Appropriate    Anesthesia Type: General.     - Airway: ETT   Induction: Intravenous.   Maintenance: Inhalation.        Consents    Anesthesia Plan(s) and associated risks, benefits, and realistic alternatives discussed. Questions answered and patient/representative(s) expressed understanding.     - Discussed: Risks, Benefits and Alternatives for BOTH SEDATION and the PROCEDURE were discussed     - Discussed with:  Patient            Postoperative Care    Pain management: Peripheral nerve block (Single Shot).   PONV prophylaxis: Ondansetron (or other 5HT-3), Dexamethasone or Solumedrol     Comments:               Toni Foote MD    I have reviewed the pertinent notes and labs in the chart from the past 30 days and  "(re)examined the patient.  Any updates or changes from those notes are reflected in this note.    Clinically Significant Risk Factors Present on Admission                   # Hypertension: Home medication list includes antihypertensive(s)           # Obesity: Estimated body mass index is 31.58 kg/m  as calculated from the following:    Height as of 2/12/25: 1.74 m (5' 8.5\").    Weight as of this encounter: 95.6 kg (210 lb 12.8 oz).       # Asthma: noted on problem list          "

## 2025-02-18 NOTE — PLAN OF CARE
PRIMARY DIAGNOSIS: ACUTE PAIN  OUTPATIENT/OBSERVATION GOALS TO BE MET BEFORE DISCHARGE:  1. Pain Status: Pain free.    2. Return to near baseline physical activity: Yes    3. Cleared for discharge by consultants (if involved): No    Discharge Planner Nurse   Safe discharge environment identified: Yes  Barriers to discharge: No       Entered by: Yakov Almaraz RN 02/18/2025 3:06 PM     Please review provider order for any additional goals.   Nurse to notify provider when observation goals have been met and patient is ready for discharge.Goal Outcome Evaluation:

## 2025-02-19 VITALS
OXYGEN SATURATION: 94 % | DIASTOLIC BLOOD PRESSURE: 57 MMHG | TEMPERATURE: 98.2 F | WEIGHT: 216.71 LBS | BODY MASS INDEX: 32.47 KG/M2 | SYSTOLIC BLOOD PRESSURE: 114 MMHG | RESPIRATION RATE: 20 BRPM | HEART RATE: 87 BPM

## 2025-02-19 LAB
PATH REPORT.COMMENTS IMP SPEC: NORMAL
PATH REPORT.COMMENTS IMP SPEC: NORMAL
PATH REPORT.FINAL DX SPEC: NORMAL
PATH REPORT.GROSS SPEC: NORMAL
PATH REPORT.MICROSCOPIC SPEC OTHER STN: NORMAL

## 2025-02-19 PROCEDURE — 250N000013 HC RX MED GY IP 250 OP 250 PS 637: Performed by: PHYSICIAN ASSISTANT

## 2025-02-19 PROCEDURE — 250N000013 HC RX MED GY IP 250 OP 250 PS 637: Performed by: OBSTETRICS & GYNECOLOGY

## 2025-02-19 RX ADMIN — AMLODIPINE BESYLATE 5 MG: 5 TABLET ORAL at 08:03

## 2025-02-19 RX ADMIN — METFORMIN ER 500 MG 500 MG: 500 TABLET ORAL at 08:04

## 2025-02-19 RX ADMIN — ACETAMINOPHEN 975 MG: 325 TABLET ORAL at 09:06

## 2025-02-19 RX ADMIN — LOSARTAN POTASSIUM 100 MG: 50 TABLET, FILM COATED ORAL at 08:03

## 2025-02-19 RX ADMIN — BUPROPION HYDROCHLORIDE 150 MG: 150 TABLET, FILM COATED, EXTENDED RELEASE ORAL at 08:03

## 2025-02-19 ASSESSMENT — ACTIVITIES OF DAILY LIVING (ADL)
ADLS_ACUITY_SCORE: 33

## 2025-02-19 NOTE — PLAN OF CARE
Goal Outcome Evaluation:       Pt is alert and oriented, able to make needs known. Pt is up independently to the bathroom.  Pt is on 2L of oxygen during the night sating at 93%. Pt was dropping to 89-90 on 1L. Pt denies any pain or discomfort. Pt has been resting when rounded upon, uneventful shift.  Dona Oviedo RN

## 2025-02-19 NOTE — PLAN OF CARE
Pt ready to discharge home accompanied by family.  Pt weaned off oxygen prior to discharge.  Pain controlled with tylenol.  All questions were answered.

## 2025-02-22 ENCOUNTER — VIRTUAL VISIT (OUTPATIENT)
Dept: URGENT CARE | Facility: CLINIC | Age: 50
End: 2025-02-22
Payer: COMMERCIAL

## 2025-02-22 DIAGNOSIS — J06.9 UPPER RESPIRATORY TRACT INFECTION, UNSPECIFIED TYPE: Primary | ICD-10-CM

## 2025-02-22 PROCEDURE — 98013 SYNCH AUDIO-ONLY EST LOW 20: CPT

## 2025-02-22 NOTE — PROGRESS NOTES
"  Cristina Eaton is a 50 year old female who is being evaluated via a billable telephone visit.      The patient has been notified of following at the time patient scheduled visit:     \"This telephone visit will be conducted via a phone call between you and your physician/provider. We have found that certain health care needs can be provided without the need for a physical exam.  This service lets us provide the care you need with a phone conversation.  If a prescription is necessary we can send it directly to your pharmacy.  If lab work is needed we can place an order for that and you can then stop by our lab to have the test done at a later time.\"   Patient has given consent for telephone visit?  Yes    Reason for telephone visit: Patient did not have access to video while the distant provider did    SUBJECTIVE:  Cristina Eaton is an 50 year old female who presents for sinus pain and nasal congestion.  Sxs started three days ago after having a hysterectomy and spending night in hospital.  Has runny nose and blowing nose often, which causes some abdominal pain with her recent surgery.  Some cough but not much.  Cough is not productive.  Mild headache.  No n/v/d.  Sinuses feel painful.  No known exposures.    PMH:   has a past medical history of Anxiety (2009), Asthma, Benign essential hypertension, Bilateral tubo-ovarian mass, Gastroesophageal reflux disease without esophagitis, Insulin resistance, Irritable bowel syndrome, Knee pain, Menorrhagia with regular cycle (09/29/2021), Metabolic syndrome X, and Obesity.  Patient Active Problem List   Diagnosis    Anxiety Disorder NOS    Irritable bowel syndrome    Obesity    Menorrhagia with regular cycle    IUD (intrauterine device) in place    Asthma    Gastroesophageal reflux disease without esophagitis    Morbid obesity (H)    Knee pain    Nutritional counseling    Pelvic mass     Social History     Socioeconomic History    Marital status:    Tobacco Use    " Smoking status: Never     Passive exposure: Never    Smokeless tobacco: Never   Vaping Use    Vaping status: Never Used   Substance and Sexual Activity    Alcohol use: Not Currently     Alcohol/week: 0.0 - 21.0 standard drinks of alcohol     Comment: social    Drug use: No    Sexual activity: Yes     Partners: Male     Birth control/protection: I.U.D., None     Comment: Mirena 10/6/2021   Other Topics Concern    Parent/sibling w/ CABG, MI or angioplasty before 65F 55M? No   Social History Narrative    . One child 13 yo. Working FT for [employer].  works for [employer]     Social Drivers of Health     Financial Resource Strain: Low Risk  (2/18/2025)    Financial Resource Strain     Within the past 12 months, have you or your family members you live with been unable to get utilities (heat, electricity) when it was really needed?: No   Food Insecurity: Low Risk  (2/18/2025)    Food Insecurity     Within the past 12 months, did you worry that your food would run out before you got money to buy more?: No     Within the past 12 months, did the food you bought just not last and you didn t have money to get more?: No   Transportation Needs: Low Risk  (2/18/2025)    Transportation Needs     Within the past 12 months, has lack of transportation kept you from medical appointments, getting your medicines, non-medical meetings or appointments, work, or from getting things that you need?: No   Physical Activity: Insufficiently Active (10/1/2024)    Exercise Vital Sign     Days of Exercise per Week: 1 day     Minutes of Exercise per Session: 20 min   Stress: Stress Concern Present (10/1/2024)    [employer] - Occupational Stress Questionnaire     Feeling of Stress : Very much   Social Connections: Unknown (10/1/2024)    Social Connection and Isolation Panel [NHANES]     Frequency of Social Gatherings with Friends and Family: Once a week   Interpersonal Safety: Low Risk  (2/18/2025)     Interpersonal Safety     Do you feel physically and emotionally safe where you currently live?: Yes     Within the past 12 months, have you been hit, slapped, kicked or otherwise physically hurt by someone?: No     Within the past 12 months, have you been humiliated or emotionally abused in other ways by your partner or ex-partner?: No   Housing Stability: Low Risk  (2/18/2025)    Housing Stability     Do you have housing? : Yes     Are you worried about losing your housing?: No     Family History   Problem Relation Age of Onset    No Known Problems Mother     Obesity Father     Hypertension Father     Anuerysm Father         a fib, aortic anuerysm    Sleep Apnea Father     Tremor Sister     No Known Problems Sister     No Known Problems Brother     No Known Problems Maternal Grandmother     Endometrial Cancer Maternal Grandfather     No Known Problems Paternal Grandmother     No Known Problems Paternal Grandfather     No Known Problems Daughter     Breast Cancer Maternal Aunt     Breast Cancer Maternal Aunt 38    No Known Problems Paternal Aunt     Hereditary Breast and Ovarian Cancer Syndrome No family hx of     Cancer No family hx of     Colon Cancer No family hx of     Ovarian Cancer No family hx of        ALLERGIES:  Patient has no known allergies.    Current Outpatient Medications   Medication Sig Dispense Refill    acetaminophen (TYLENOL) 500 MG tablet Take 1-2 tablets (500-1,000 mg) by mouth every 6 hours as needed for mild pain.      albuterol (PROAIR HFA/PROVENTIL HFA/VENTOLIN HFA) 108 (90 Base) MCG/ACT inhaler Inhale 2 puffs into the lungs every 6 hours as needed for shortness of breath or wheezing 18 g 3    amLODIPine (NORVASC) 5 MG tablet Take 1 tablet (5 mg) by mouth daily. 90 tablet 4    buPROPion (WELLBUTRIN XL) 150 MG 24 hr tablet Take 1 tablet (150 mg) by mouth every morning. 90 tablet 4    ibuprofen (ADVIL/MOTRIN) 600 MG tablet Take 1 tablet (600 mg) by mouth every 6 hours as needed. 20 tablet 0     losartan (COZAAR) 100 MG tablet Take 1 tablet (100 mg) by mouth daily. 90 tablet 4    metFORMIN (GLUCOPHAGE XR) 500 MG 24 hr tablet Take 1 tablet (500 mg) by mouth 2 times daily (with meals). (Patient taking differently: Take 500 mg by mouth daily (with breakfast).) 180 tablet 3    Semaglutide-Weight Management (WEGOVY) 2.4 MG/0.75ML pen Inject 2.4 mg subcutaneously once a week. Do not start before September 23, 2024. 9 mL 3    senna-docusate (SENOKOT-S/PERICOLACE) 8.6-50 MG tablet Take 1 tablet by mouth 2 times daily as needed for constipation.       No current facility-administered medications for this visit.         ROS:  ROS is done and is negative for general/constitutional, eye, ENT, Respiratory, cardiovascular, GI, , Skin, musculoskeletal except as noted elsewhere.  All other review of systems negative except as noted elsewhere.      OBJECTIVE:    No vital signs taken as is virtual visit.  GENERAL : Alert and oriented.  No distress detected.    NOSE: sounds congested  RESP: No respiratory distress detected during phone conversation         ASSESSMENT/PLAN:    ASSESSMENT / PLAN:  (J06.9) Upper respiratory tract infection, unspecified type  (primary encounter diagnosis)  Comment: currently sxs c/with viral etiology causing her sinus and nasal sxs.    Plan: advised otc meds such as flonase and sudafed, and sinus rinses. I reviewed supportive care, otc meds to use if needed, expected course, and signs of concern.  Follow up as needed or if does not improve within 7 day(s) or if worsens in any way.  Reviewed red flag symptoms and is to go to the ER if experiences any of these.        See [facility] for orders, medications, letters, patient instructions    Trudy Marroquin MD  2/22/2025, 8:33 AM      Phone call duration:  10 minutes, initially on video then changed to phone due to pt technical issues.    Originating Location (pt. Location): Home    Distant Location (provider location):  [facility]  URGENT CARE

## 2025-03-02 LAB
PATH REPORT.ADDENDUM SPEC: NORMAL
PATH REPORT.COMMENTS IMP SPEC: NORMAL
PATH REPORT.FINAL DX SPEC: NORMAL
PATH REPORT.GROSS SPEC: NORMAL
PATH REPORT.INTRAOP OBS SPEC DOC: NORMAL
PATH REPORT.MICROSCOPIC SPEC OTHER STN: NORMAL
PATH REPORT.RELEVANT HX SPEC: NORMAL
PHOTO IMAGE: NORMAL

## 2025-03-26 ENCOUNTER — TRANSFERRED RECORDS (OUTPATIENT)
Dept: HEALTH INFORMATION MANAGEMENT | Facility: CLINIC | Age: 50
End: 2025-03-26
Payer: COMMERCIAL

## 2025-04-07 ENCOUNTER — OFFICE VISIT (OUTPATIENT)
Dept: PEDIATRICS | Facility: CLINIC | Age: 50
End: 2025-04-07
Payer: COMMERCIAL

## 2025-04-07 VITALS
WEIGHT: 218.1 LBS | TEMPERATURE: 97.7 F | HEART RATE: 97 BPM | OXYGEN SATURATION: 100 % | BODY MASS INDEX: 32.68 KG/M2 | RESPIRATION RATE: 16 BRPM | DIASTOLIC BLOOD PRESSURE: 84 MMHG | SYSTOLIC BLOOD PRESSURE: 131 MMHG

## 2025-04-07 DIAGNOSIS — E88.819 INSULIN RESISTANCE: ICD-10-CM

## 2025-04-07 DIAGNOSIS — E66.811 CLASS 1 OBESITY WITH BODY MASS INDEX (BMI) OF 33.0 TO 33.9 IN ADULT, UNSPECIFIED OBESITY TYPE, UNSPECIFIED WHETHER SERIOUS COMORBIDITY PRESENT: ICD-10-CM

## 2025-04-07 DIAGNOSIS — N95.1 MENOPAUSAL SYNDROME (HOT FLASHES): Primary | ICD-10-CM

## 2025-04-07 DIAGNOSIS — E88.810 METABOLIC SYNDROME: ICD-10-CM

## 2025-04-07 PROCEDURE — 3079F DIAST BP 80-89 MM HG: CPT | Performed by: PHYSICIAN ASSISTANT

## 2025-04-07 PROCEDURE — 3075F SYST BP GE 130 - 139MM HG: CPT | Performed by: PHYSICIAN ASSISTANT

## 2025-04-07 PROCEDURE — 99214 OFFICE O/P EST MOD 30 MIN: CPT | Performed by: PHYSICIAN ASSISTANT

## 2025-04-07 PROCEDURE — 1125F AMNT PAIN NOTED PAIN PRSNT: CPT | Performed by: PHYSICIAN ASSISTANT

## 2025-04-07 RX ORDER — ESTRADIOL 0.05 MG/D
1 PATCH, EXTENDED RELEASE TRANSDERMAL
Qty: 8 PATCH | Refills: 1 | Status: SHIPPED | OUTPATIENT
Start: 2025-04-07

## 2025-04-07 RX ORDER — METFORMIN HYDROCHLORIDE 500 MG/1
500 TABLET, EXTENDED RELEASE ORAL 2 TIMES DAILY WITH MEALS
COMMUNITY
Start: 2025-04-07

## 2025-04-07 ASSESSMENT — PAIN SCALES - GENERAL: PAINLEVEL_OUTOF10: MILD PAIN (2)

## 2025-04-07 NOTE — PROGRESS NOTES
Assessment & Plan     Menopausal syndrome (hot flashes)  Discussed with PCP. Patient will begin estradiol patch as directed. Follow up in 6 weeks.   - estradiol (VIVELLE-DOT) 0.05 MG/24HR bi-weekly patch; Place 1 patch over 96 hours onto the skin twice a week.    Insulin resistance  - metFORMIN (GLUCOPHAGE XR) 500 MG 24 hr tablet; Take 1 tablet (500 mg) by mouth 2 times daily (with meals).    Class 1 obesity with body mass index (BMI) of 33.0 to 33.9 in adult, unspecified obesity type, unspecified whether serious comorbidity present  - metFORMIN (GLUCOPHAGE XR) 500 MG 24 hr tablet; Take 1 tablet (500 mg) by mouth 2 times daily (with meals).    Metabolic syndrome  - metFORMIN (GLUCOPHAGE XR) 500 MG 24 hr tablet; Take 1 tablet (500 mg) by mouth 2 times daily (with meals).    Aniyah Evans is a 50 year old, presenting for the following health issues:  Recheck Medication        4/7/2025     7:47 AM   Additional Questions   Roomed by [facility]   Accompanied by self     HPI      Patient is s/p total hysterectomy, b/l oophrectomy on 2/18. Three days later, patient notes hot flashes every hour. Awakening patient from sleep. No irritability. ? Weight gain although less activity due to surgery. Brain fog which is unusual for patient.     Two masses of the ovaries/uterus that were removed. Elevated Ca 125 and monitored by [facility].      The 10-year ASCVD risk score (Susie AYALA, et al., 2019) is: 1.4%    Values used to calculate the score:      Age: 50 years      Sex: Female      Is Non- : No      Diabetic: No      Tobacco smoker: No      Systolic Blood Pressure: 131 mmHg      Is BP treated: Yes      HDL Cholesterol: 41 mg/dL      Total Cholesterol: 135 mg/dL      Review of Systems  Constitutional, HEENT, cardiovascular, pulmonary, gi and gu systems are negative, except as otherwise noted.      Objective    /84 (BP Location: Right arm, Patient Position: Sitting, Cuff Size: Adult Large)   Pulse 97    Temp 97.7  F (36.5  C) (Oral)   Resp 16   Wt 98.9 kg (218 lb 1.6 oz)   LMP  (LMP Unknown)   SpO2 100%   BMI 32.68 kg/m    Body mass index is 32.68 kg/m .  Physical Exam   GENERAL: alert and no distress  EYES: Eyes grossly normal to inspection, PERRL and conjunctivae and sclerae normal    No results found for any visits on 04/07/25.        Signed Electronically by: Mehul Gr PA-C

## 2025-04-09 LAB
PATH REPORT.ADDENDUM SPEC: NORMAL
PATH REPORT.ADDENDUM SPEC: NORMAL
PATH REPORT.COMMENTS IMP SPEC: NORMAL
PATH REPORT.FINAL DX SPEC: NORMAL
PATH REPORT.GROSS SPEC: NORMAL
PATH REPORT.INTRAOP OBS SPEC DOC: NORMAL
PATH REPORT.MICROSCOPIC SPEC OTHER STN: NORMAL
PATH REPORT.RELEVANT HX SPEC: NORMAL
PATHOLOGY SYNOPTIC REPORT: NORMAL
PHOTO IMAGE: NORMAL

## 2025-05-08 ENCOUNTER — TRANSFERRED RECORDS (OUTPATIENT)
Dept: HEALTH INFORMATION MANAGEMENT | Facility: CLINIC | Age: 50
End: 2025-05-08
Payer: COMMERCIAL

## 2025-06-02 DIAGNOSIS — N95.1 MENOPAUSAL SYNDROME (HOT FLASHES): ICD-10-CM

## 2025-06-02 RX ORDER — ESTRADIOL 0.05 MG/D
PATCH, EXTENDED RELEASE TRANSDERMAL
Qty: 8 PATCH | Refills: 4 | Status: SHIPPED | OUTPATIENT
Start: 2025-06-02

## 2025-06-16 ENCOUNTER — TELEPHONE (OUTPATIENT)
Dept: SURGERY | Facility: CLINIC | Age: 50
End: 2025-06-16
Payer: COMMERCIAL

## 2025-06-16 VITALS — WEIGHT: 206 LBS | HEIGHT: 69 IN | BODY MASS INDEX: 30.51 KG/M2

## 2025-06-16 NOTE — TELEPHONE ENCOUNTER
Prior Authorization Approval    Medication: WEGOVY 2.4 MG/0.75ML SC SOAJ  Authorization Effective Date: 5/17/2025  Authorization Expiration Date: 6/16/2026  Insurance Company: [employer] [place] - Phone [phone] Fax [phone]  Which Pharmacy is filling the prescription: [id number] - [place], [place] - [address] [place] [address]  Pharmacy Notified: YES  Patient Notified: YES (faxed approval letter to pharmacy and notified patient via Optynt message)

## 2025-06-16 NOTE — TELEPHONE ENCOUNTER
Retail Pharmacy Prior Authorization Team   Phone: [phone]    PA Initiation    Medication: WEGOVY 2.4 MG/0.75ML SC SOAJ  Insurance Company: [employer] [place] - Phone [phone] Fax [phone]  Pharmacy Filling the Rx: [id number] - [place], [place] - [address] [place] [address]  Filling Pharmacy Phone: [phone]  Filling Pharmacy Fax:    Start Date: 6/16/2025    HÉCTOR SULLIVAN ([place]: [id number])

## 2025-06-16 NOTE — TELEPHONE ENCOUNTER
Prior Authorization Retail Medication Request    Medication/Dose: Wegovy 2.4 mg pen injectors weekly  New/renewal/insurance change PA/secondary ins. PA:  Appeal Approval from 2025 - renewal needed  Rationale:  Prior to starting Wegovy on 2023 she weighed 245# and had BMI 35.73. She has done remarkably well, working with our comprehensive weight management team which includes registered dietitians, health coaches, psychologists, board certified obesity medicine physicians, bariatric surgeons, and bariatric certified nurses.     Current weight and BMI: 206 lb, BMI 30.87, Percentage weight loss 15.92%    Insurance   Key: [id number]    Pharmacy Information (if different than what is on RX)  Name:  [facility]  Phone: [phone]  Fax: [phone]    Clinic Information  Preferred routing pool for dept communication: Bariatric Surgery Support [facility]

## 2025-06-17 ENCOUNTER — TELEPHONE (OUTPATIENT)
Dept: PEDIATRICS | Facility: CLINIC | Age: 50
End: 2025-06-17
Payer: COMMERCIAL

## 2025-06-17 ENCOUNTER — TRANSFERRED RECORDS (OUTPATIENT)
Dept: HEALTH INFORMATION MANAGEMENT | Facility: CLINIC | Age: 50
End: 2025-06-17
Payer: COMMERCIAL

## 2025-06-17 NOTE — TELEPHONE ENCOUNTER
"RN received call from [facility] in [place]. Pharmacy is calling to report med error to Dr. Almaraz. Per pharmacy, patient was given prescription for Diazepam 5mg tablet instead of her refill for buPROPion (WELLBUTRIN XL) 150 MG 24 hr tablet. Patient took 1 tablet of diazepam yesterday 6/16/25. Patient reported to pharmacy she felt \"drunk feeling\" at work. Patient did not go to  and was not seen for symptoms. Patient contacted pharmacy after re-examining the bottle she was given. Pharmacy has taken back the medication bottle of diazepam from patient and has given her correct bottle for bupropion.     Routing to inform prescribing provider.     Jose CUEVAS RN 6/17/2025 at 12:50 PM    "

## 2025-06-17 NOTE — TELEPHONE ENCOUNTER
Left message asking patient to return the call.     How is patient feeling after medication error from pharmacy.  Given diazepam instead of bupropion.    Aria Eaton RN

## 2025-06-18 NOTE — TELEPHONE ENCOUNTER
Called pt and left VM to call back and speak with a nurse.    Upon call back:  - please see how pt is feeling after medication error by pharmacy   Given diazepam instead of bupropion by pharmacy     Radha Matthew RN

## 2025-06-19 NOTE — TELEPHONE ENCOUNTER
Attempt x 1. Called pt and left a message to call back to [phone] and to ask to speak to a nurse.     [employer] message sent.     When pt calls back,     See how pt is feeling after medication error by pharmacy   Given diazepam instead of bupropion by pharmacy.     Nohelia THOMPSON, RN   Clinic RN  [facility]

## 2025-06-19 NOTE — TELEPHONE ENCOUNTER
"Dr. Almaraz- DEEPIKA only. See below regarding follow-up on medication error. She inadvertently took diazepam 5 mg on Monday.     Pt calling back,     She states she, \"is doing fine now.\" She states she felt pretty awful on Monday after taking the medication, but is not feeling any lingering effects.     Nohelia THOMPSON RN   Clinic RN  [facility]    "

## 2025-09-01 DIAGNOSIS — E66.9 OBESITY (BMI 30-39.9): ICD-10-CM

## 2025-09-02 RX ORDER — SEMAGLUTIDE 2.4 MG/.75ML
INJECTION, SOLUTION SUBCUTANEOUS
Qty: 9 ML | Refills: 0 | Status: SHIPPED | OUTPATIENT
Start: 2025-09-02

## 2025-09-03 ENCOUNTER — PATIENT OUTREACH (OUTPATIENT)
Dept: CARE COORDINATION | Facility: CLINIC | Age: 50
End: 2025-09-03
Payer: COMMERCIAL

## (undated) DEVICE — DRAPE LEGGINGS 8421

## (undated) DEVICE — SOL NACL 0.9% INJ 1000ML BAG 2B1324X

## (undated) DEVICE — DRAPE LAP/CHOLE W/O POUCH 89225

## (undated) DEVICE — PROTECTOR ARM STANDARD ONE STEP 40433 (COI)

## (undated) DEVICE — GLOVE BIOGEL PI ULTRATOUCH G SZ 7.0 42170

## (undated) DEVICE — POSITIONING KIT THE PINK PAD XL 40X20X1IN 40583 (COI)

## (undated) DEVICE — DAVINCI XI OBTURATOR BLADELESS 8MM 470359

## (undated) DEVICE — SYR 50ML SLIP TIP W/O NDL 309654

## (undated) DEVICE — DAVINCI XI DRAPE COLUMN 470341

## (undated) DEVICE — SUCTION MANIFOLD NEPTUNE 2 SYS 1 PORT 702-025-000

## (undated) DEVICE — TRAP SPECIMAN 5CC-40CC DYND44140

## (undated) DEVICE — PACK TRENGUARD 450 PROCEDURAL 2065406

## (undated) DEVICE — SU VICRYL+ 3-0 27IN SH UND VCP416H

## (undated) DEVICE — GLOVE UNDER INDICATOR PI SZ 7.0 LF 41670

## (undated) DEVICE — SUTURE VICRYL+ 0 27IN CT-1 UND VCP260H

## (undated) DEVICE — DRAPE IOBAN INCISE 23X17" 6650EZ

## (undated) DEVICE — SUTURE VICRYL+ 0 36IN CT-1 UND VCP946H

## (undated) DEVICE — LUBRICANT INST ELECTROLUBE EL101

## (undated) DEVICE — MAT FLOOR WATERPROOF BACKSHEET FMBP30

## (undated) DEVICE — SYR 10ML FINGER CONTROL W/O NDL 309695

## (undated) DEVICE — BLADE KNIFE SURG 11 371111

## (undated) DEVICE — ANTIFOG SOLUTION SEE SHARP 150M TROCAR SWABS 30978 (COI)

## (undated) DEVICE — DRAPE POUCH INSTRUMENT 3 POCKET 1018L

## (undated) DEVICE — PREP DYNA-HEX 4% CHG SCRUB 4OZ BOTTLE MDS098710

## (undated) DEVICE — SOL WATER IRRIG 1000ML BOTTLE 2F7114

## (undated) DEVICE — DRAPE SHEET TABLE COVER KC 42301*

## (undated) DEVICE — SURGICEL ABSORBABLE HEMOSTAT SNOW 4"X4" 2083

## (undated) DEVICE — SU STRATAFIX PDS 0 CT-2 30CM SXPP1B405

## (undated) DEVICE — SU DERMABOND ADVANCED .7ML DNX12

## (undated) DEVICE — SUCTION STRYKERFLOW II 250-070-500

## (undated) DEVICE — GOWN IMPERVIOUS BREATHABLE SMART LG 89015

## (undated) DEVICE — NEEDLE HYPO MAGELLAN SAFETY 22GA 1 1/2IN 8881850215

## (undated) DEVICE — TUBING FILTER TRI-LUMEN AIRSEAL ASC-EVAC1

## (undated) DEVICE — DAVINCI HOT SHEARS TIP COVER  400180

## (undated) DEVICE — SUTURE MONOCRYL 3-0 18 PS2 UND MCP497G

## (undated) DEVICE — DAVINCI XI DRAPE ARM 470015

## (undated) DEVICE — DAVINCI XI SEAL UNIVERSAL 5-12MM 470500

## (undated) DEVICE — PREP CHLORAPREP 26ML TINTED HI-LITE ORANGE 930815

## (undated) DEVICE — GOWN XLG DISP 9545

## (undated) DEVICE — VIAL DECANTER STERILE WHITE DYNJDEC06

## (undated) DEVICE — DAVINCI XI HANDPIECE ESU VESSEL SEALER 8MM EXT 480422

## (undated) DEVICE — GLOVE BIOGEL PI ULTRATOUCH G SZ 6.5 42165

## (undated) DEVICE — CUSTOM PACK DA VINCI GYN SMA5BDVHEA

## (undated) DEVICE — RETR ELEV / UTERINE MANIPULATOR V-CARE LG CUP 60-6085-202A

## (undated) RX ORDER — FENTANYL CITRATE 50 UG/ML
INJECTION, SOLUTION INTRAMUSCULAR; INTRAVENOUS
Status: DISPENSED
Start: 2025-02-18

## (undated) RX ORDER — PROPOFOL 10 MG/ML
INJECTION, EMULSION INTRAVENOUS
Status: DISPENSED
Start: 2025-02-18

## (undated) RX ORDER — LIDOCAINE HYDROCHLORIDE 10 MG/ML
INJECTION, SOLUTION EPIDURAL; INFILTRATION; INTRACAUDAL; PERINEURAL
Status: DISPENSED
Start: 2025-02-18

## (undated) RX ORDER — GLYCOPYRROLATE 0.2 MG/ML
INJECTION, SOLUTION INTRAMUSCULAR; INTRAVENOUS
Status: DISPENSED
Start: 2025-02-18

## (undated) RX ORDER — ONDANSETRON 2 MG/ML
INJECTION INTRAMUSCULAR; INTRAVENOUS
Status: DISPENSED
Start: 2025-02-18

## (undated) RX ORDER — DEXAMETHASONE SODIUM PHOSPHATE 10 MG/ML
INJECTION, SOLUTION INTRAMUSCULAR; INTRAVENOUS
Status: DISPENSED
Start: 2025-02-18

## (undated) RX ORDER — ALBUTEROL SULFATE 90 UG/1
INHALANT RESPIRATORY (INHALATION)
Status: DISPENSED
Start: 2025-02-18

## (undated) RX ORDER — KETOROLAC TROMETHAMINE 30 MG/ML
INJECTION, SOLUTION INTRAMUSCULAR; INTRAVENOUS
Status: DISPENSED
Start: 2025-02-18

## (undated) RX ORDER — BUPIVACAINE HYDROCHLORIDE AND EPINEPHRINE 2.5; 5 MG/ML; UG/ML
INJECTION, SOLUTION INFILTRATION; PERINEURAL
Status: DISPENSED
Start: 2025-02-18